# Patient Record
Sex: MALE | Race: WHITE | Employment: FULL TIME | URBAN - METROPOLITAN AREA
[De-identification: names, ages, dates, MRNs, and addresses within clinical notes are randomized per-mention and may not be internally consistent; named-entity substitution may affect disease eponyms.]

---

## 2021-06-29 ENCOUNTER — HOSPITAL ENCOUNTER (OUTPATIENT)
Dept: NON INVASIVE DIAGNOSTICS | Age: 66
Discharge: HOME | End: 2021-06-29
Payer: COMMERCIAL

## 2021-06-29 ENCOUNTER — TRANSCRIBE ORDER (OUTPATIENT)
Dept: REGISTRATION | Age: 66
End: 2021-06-29

## 2021-06-29 ENCOUNTER — HOSPITAL ENCOUNTER (OUTPATIENT)
Dept: LAB | Age: 66
Discharge: HOME | End: 2021-06-29
Payer: COMMERCIAL

## 2021-06-29 DIAGNOSIS — Z01.812 BLOOD TESTS PRIOR TO TREATMENT OR PROCEDURE: ICD-10-CM

## 2021-06-29 DIAGNOSIS — K43.9 VENTRAL HERNIA WITHOUT OBSTRUCTION OR GANGRENE: ICD-10-CM

## 2021-06-29 DIAGNOSIS — K43.9 VENTRAL HERNIA WITHOUT OBSTRUCTION OR GANGRENE: Primary | ICD-10-CM

## 2021-06-29 LAB
ATRIAL RATE: 82 BPM
CALCULATED P AXIS, ECG09: 63 DEGREES
CALCULATED R AXIS, ECG10: 32 DEGREES
CALCULATED T AXIS, ECG11: 33 DEGREES
DIAGNOSIS, 93000: NORMAL
HCT VFR BLD AUTO: 42.7 % (ref 36–48)
HGB BLD-MCNC: 13.3 G/DL (ref 13–16)
P-R INTERVAL, ECG05: 146 MS
POTASSIUM SERPL-SCNC: 4.5 MMOL/L (ref 3.5–5.5)
Q-T INTERVAL, ECG07: 376 MS
QRS DURATION, ECG06: 82 MS
QTC CALCULATION (BEZET), ECG08: 439 MS
VENTRICULAR RATE, ECG03: 82 BPM

## 2021-06-29 PROCEDURE — 85014 HEMATOCRIT: CPT

## 2021-06-29 PROCEDURE — 93005 ELECTROCARDIOGRAM TRACING: CPT

## 2021-06-29 PROCEDURE — 36415 COLL VENOUS BLD VENIPUNCTURE: CPT

## 2021-06-29 PROCEDURE — 84132 ASSAY OF SERUM POTASSIUM: CPT

## 2021-07-15 ENCOUNTER — HOSPITAL ENCOUNTER (INPATIENT)
Age: 66
LOS: 3 days | Discharge: HOME | DRG: 392 | End: 2021-07-18
Attending: EMERGENCY MEDICINE | Admitting: HOSPITALIST
Payer: COMMERCIAL

## 2021-07-15 DIAGNOSIS — K57.20 ABSCESS OF SIGMOID COLON DUE TO DIVERTICULITIS: Primary | ICD-10-CM

## 2021-07-15 LAB
ALBUMIN SERPL-MCNC: 2.5 G/DL (ref 3.4–5)
ALBUMIN/GLOB SERPL: 0.8 {RATIO} (ref 0.8–1.7)
ALP SERPL-CCNC: 54 U/L (ref 45–117)
ALT SERPL-CCNC: 17 U/L (ref 16–61)
ANION GAP SERPL CALC-SCNC: 8 MMOL/L (ref 3–18)
APTT PPP: 32 SEC (ref 23–36.4)
AST SERPL-CCNC: 8 U/L (ref 10–38)
BASOPHILS # BLD: 0 K/UL (ref 0–0.1)
BASOPHILS NFR BLD: 0 % (ref 0–2)
BILIRUB SERPL-MCNC: 0.5 MG/DL (ref 0.2–1)
BUN SERPL-MCNC: 11 MG/DL (ref 7–18)
BUN/CREAT SERPL: 20 (ref 12–20)
CALCIUM SERPL-MCNC: 7.6 MG/DL (ref 8.5–10.1)
CHLORIDE SERPL-SCNC: 109 MMOL/L (ref 100–111)
CO2 SERPL-SCNC: 26 MMOL/L (ref 21–32)
CREAT SERPL-MCNC: 0.55 MG/DL (ref 0.6–1.3)
DIFFERENTIAL METHOD BLD: ABNORMAL
EOSINOPHIL # BLD: 0.2 K/UL (ref 0–0.4)
EOSINOPHIL NFR BLD: 2 % (ref 0–5)
ERYTHROCYTE [DISTWIDTH] IN BLOOD BY AUTOMATED COUNT: 15.5 % (ref 11.6–14.5)
GLOBULIN SER CALC-MCNC: 3 G/DL (ref 2–4)
GLUCOSE SERPL-MCNC: 81 MG/DL (ref 74–99)
HCT VFR BLD AUTO: 36.3 % (ref 36–48)
HGB BLD-MCNC: 11.8 G/DL (ref 13–16)
INR PPP: 1 (ref 0.8–1.2)
LACTATE BLD-SCNC: 1.23 MMOL/L (ref 0.4–2)
LIPASE SERPL-CCNC: 54 U/L (ref 73–393)
LYMPHOCYTES # BLD: 1.8 K/UL (ref 0.9–3.6)
LYMPHOCYTES NFR BLD: 17 % (ref 21–52)
MCH RBC QN AUTO: 26.8 PG (ref 24–34)
MCHC RBC AUTO-ENTMCNC: 32.5 G/DL (ref 31–37)
MCV RBC AUTO: 82.3 FL (ref 74–97)
MONOCYTES # BLD: 1.5 K/UL (ref 0.05–1.2)
MONOCYTES NFR BLD: 14 % (ref 3–10)
NEUTS SEG # BLD: 6.8 K/UL (ref 1.8–8)
NEUTS SEG NFR BLD: 66 % (ref 40–73)
PLATELET # BLD AUTO: 335 K/UL (ref 135–420)
PMV BLD AUTO: 9.4 FL (ref 9.2–11.8)
POTASSIUM SERPL-SCNC: 3.2 MMOL/L (ref 3.5–5.5)
PROT SERPL-MCNC: 5.5 G/DL (ref 6.4–8.2)
PROTHROMBIN TIME: 13.3 SEC (ref 11.5–15.2)
RBC # BLD AUTO: 4.41 M/UL (ref 4.35–5.65)
SODIUM SERPL-SCNC: 143 MMOL/L (ref 136–145)
WBC # BLD AUTO: 10.3 K/UL (ref 4.6–13.2)

## 2021-07-15 PROCEDURE — 85610 PROTHROMBIN TIME: CPT

## 2021-07-15 PROCEDURE — 65270000029 HC RM PRIVATE

## 2021-07-15 PROCEDURE — 74011000258 HC RX REV CODE- 258: Performed by: PHYSICIAN ASSISTANT

## 2021-07-15 PROCEDURE — 74011000250 HC RX REV CODE- 250: Performed by: HOSPITALIST

## 2021-07-15 PROCEDURE — 83690 ASSAY OF LIPASE: CPT

## 2021-07-15 PROCEDURE — 74011250636 HC RX REV CODE- 250/636: Performed by: HOSPITALIST

## 2021-07-15 PROCEDURE — 99283 EMERGENCY DEPT VISIT LOW MDM: CPT

## 2021-07-15 PROCEDURE — 80053 COMPREHEN METABOLIC PANEL: CPT

## 2021-07-15 PROCEDURE — 85730 THROMBOPLASTIN TIME PARTIAL: CPT

## 2021-07-15 PROCEDURE — 74011250637 HC RX REV CODE- 250/637: Performed by: HOSPITALIST

## 2021-07-15 PROCEDURE — 74011250636 HC RX REV CODE- 250/636: Performed by: PHYSICIAN ASSISTANT

## 2021-07-15 PROCEDURE — 74011000258 HC RX REV CODE- 258: Performed by: HOSPITALIST

## 2021-07-15 PROCEDURE — 83605 ASSAY OF LACTIC ACID: CPT

## 2021-07-15 PROCEDURE — 87040 BLOOD CULTURE FOR BACTERIA: CPT

## 2021-07-15 PROCEDURE — 96365 THER/PROPH/DIAG IV INF INIT: CPT

## 2021-07-15 PROCEDURE — 85025 COMPLETE CBC W/AUTO DIFF WBC: CPT

## 2021-07-15 RX ORDER — POTASSIUM CHLORIDE 20 MEQ/1
40 TABLET, EXTENDED RELEASE ORAL
Status: COMPLETED | OUTPATIENT
Start: 2021-07-15 | End: 2021-07-15

## 2021-07-15 RX ORDER — METRONIDAZOLE 500 MG/100ML
500 INJECTION, SOLUTION INTRAVENOUS EVERY 8 HOURS
Status: DISCONTINUED | OUTPATIENT
Start: 2021-07-15 | End: 2021-07-15 | Stop reason: SDUPTHER

## 2021-07-15 RX ORDER — ACETAMINOPHEN 325 MG/1
650 TABLET ORAL
Status: DISCONTINUED | OUTPATIENT
Start: 2021-07-15 | End: 2021-07-18 | Stop reason: HOSPADM

## 2021-07-15 RX ORDER — SODIUM CHLORIDE 9 MG/ML
50 INJECTION, SOLUTION INTRAVENOUS CONTINUOUS
Status: DISCONTINUED | OUTPATIENT
Start: 2021-07-15 | End: 2021-07-18 | Stop reason: HOSPADM

## 2021-07-15 RX ORDER — METRONIDAZOLE 500 MG/100ML
500 INJECTION, SOLUTION INTRAVENOUS
Status: COMPLETED | OUTPATIENT
Start: 2021-07-15 | End: 2021-07-15

## 2021-07-15 RX ORDER — HYDROCODONE BITARTRATE AND ACETAMINOPHEN 5; 325 MG/1; MG/1
1 TABLET ORAL
Status: DISCONTINUED | OUTPATIENT
Start: 2021-07-15 | End: 2021-07-18 | Stop reason: HOSPADM

## 2021-07-15 RX ORDER — ATORVASTATIN CALCIUM 10 MG/1
10 TABLET, FILM COATED ORAL DAILY
COMMUNITY

## 2021-07-15 RX ORDER — METRONIDAZOLE 500 MG/100ML
500 INJECTION, SOLUTION INTRAVENOUS EVERY 8 HOURS
Status: DISCONTINUED | OUTPATIENT
Start: 2021-07-16 | End: 2021-07-18 | Stop reason: HOSPADM

## 2021-07-15 RX ORDER — ONDANSETRON 2 MG/ML
4 INJECTION INTRAMUSCULAR; INTRAVENOUS
Status: DISCONTINUED | OUTPATIENT
Start: 2021-07-15 | End: 2021-07-18 | Stop reason: HOSPADM

## 2021-07-15 RX ORDER — MORPHINE SULFATE 2 MG/ML
1 INJECTION, SOLUTION INTRAMUSCULAR; INTRAVENOUS
Status: DISCONTINUED | OUTPATIENT
Start: 2021-07-15 | End: 2021-07-17

## 2021-07-15 RX ADMIN — POTASSIUM CHLORIDE 40 MEQ: 1500 TABLET, EXTENDED RELEASE ORAL at 18:38

## 2021-07-15 RX ADMIN — PIPERACILLIN AND TAZOBACTAM 4.5 G: 4; .5 INJECTION, POWDER, LYOPHILIZED, FOR SOLUTION INTRAVENOUS; PARENTERAL at 22:56

## 2021-07-15 RX ADMIN — FAMOTIDINE 20 MG: 10 INJECTION, SOLUTION INTRAVENOUS at 22:56

## 2021-07-15 RX ADMIN — METRONIDAZOLE 500 MG: 500 INJECTION, SOLUTION INTRAVENOUS at 15:56

## 2021-07-15 RX ADMIN — SODIUM CHLORIDE 1000 ML: 900 INJECTION, SOLUTION INTRAVENOUS at 15:12

## 2021-07-15 RX ADMIN — SODIUM CHLORIDE 75 ML/HR: 900 INJECTION, SOLUTION INTRAVENOUS at 18:39

## 2021-07-15 RX ADMIN — PIPERACILLIN AND TAZOBACTAM 4.5 G: 4; .5 INJECTION, POWDER, LYOPHILIZED, FOR SOLUTION INTRAVENOUS; PARENTERAL at 15:13

## 2021-07-15 NOTE — ED PROVIDER NOTES
EMERGENCY DEPARTMENT HISTORY AND PHYSICAL EXAM    Date: 7/15/2021  Patient Name: Kathie Stanford    History of Presenting Illness     Chief Complaint   Patient presents with    Abdominal Pain         History Provided By: Patient    Chief Complaint: abdominal pain    HPI(Context):   2:37 PM  Kathie Stanford is a 61 y.o. male with PMHX of HLP who presents to the emergency department C/O periumbilical abdominal pain. Pt was sent to [facility] ED by Dr. Jaene Garcia [facility]) for sigmoid diverticulitis with abscess. Pt had outpatient CT scan performed at [facility]. imagine this AM and pt was told to come to ED for admission. Pt reports pain is worse with palpation of area and pt thought it was due to ventral hernia. Pt was supposed to have hernia repair with Dr. Susanne Sandoval this week but surgery was aborted after a mass was found during abdominal palpation. Pt denies fever, chills, nausea, vomiting, diarrhea, and any other sxs or complaints. Last colonoscopy was 3 years ago which was normal per patient. PCP: Cristian Herrmann MD        Past History     Past Medical History:  Past Medical History:   Diagnosis Date    High cholesterol        Past Surgical History:  History reviewed. No pertinent surgical history. Family History:  History reviewed. No pertinent family history. Social History:  Social History     Tobacco Use    Smoking status: Never Smoker    Smokeless tobacco: Never Used   Substance Use Topics    Alcohol use: Yes    Drug use: Not Currently       Allergies:  No Known Allergies      Review of Systems   Review of Systems   Constitutional: Negative for chills and fever. Gastrointestinal: Positive for abdominal pain. Negative for diarrhea, nausea and vomiting. Musculoskeletal: Negative for back pain. Neurological: Negative for dizziness and light-headedness. All other systems reviewed and are negative.       Physical Exam     Vitals:    07/15/21 1426 07/15/21 1600 07/15/21 1615   BP: (!) 189/65 (!) 158/66 (!) 154/66   Pulse: 96 82 81   Resp: 16 15 15   Temp: 98 °F (36.7 °C)     SpO2: 98% 95% 93%   Weight: 104.3 kg (230 lb)     Height: 5' 11\" (1.803 m)       Physical Exam  Vitals and nursing note reviewed. Constitutional:       General: He is not in acute distress. Appearance: He is well-developed. He is not diaphoretic. Comments:  male in NAD. Alert. Appears comfortable. HENT:      Head: Normocephalic and atraumatic. Right Ear: External ear normal.      Left Ear: External ear normal.      Nose: Nose normal.   Eyes:      General: No scleral icterus. Right eye: No discharge. Left eye: No discharge. Conjunctiva/sclera: Conjunctivae normal.   Cardiovascular:      Rate and Rhythm: Normal rate and regular rhythm. Heart sounds: Normal heart sounds. No murmur heard. No friction rub. No gallop. Pulmonary:      Effort: Pulmonary effort is normal. No tachypnea, accessory muscle usage or respiratory distress. Breath sounds: Normal breath sounds. No decreased breath sounds, wheezing, rhonchi or rales. Abdominal:      General: Bowel sounds are normal. There is no distension. Palpations: Abdomen is soft. There is mass (periumbilical). Tenderness: There is abdominal tenderness in the periumbilical area. There is no right CVA tenderness, guarding or rebound. Negative signs include McBurney's sign. Musculoskeletal:         General: Normal range of motion. Cervical back: Normal range of motion. Skin:     General: Skin is warm and dry. Neurological:      Mental Status: He is alert and oriented to person, place, and time.    Psychiatric:         Behavior: Behavior normal.         Judgment: Judgment normal.             Diagnostic Study Results     Labs -     Recent Results (from the past 12 hour(s))   CBC WITH AUTOMATED DIFF    Collection Time: 07/15/21  2:40 PM   Result Value Ref Range    WBC 10.3 4.6 - 13.2 K/uL    RBC 4.41 4.35 - 5.65 M/uL HGB 11.8 (L) 13.0 - 16.0 g/dL    HCT 36.3 36.0 - 48.0 %    MCV 82.3 74.0 - 97.0 FL    MCH 26.8 24.0 - 34.0 PG    MCHC 32.5 31.0 - 37.0 g/dL    RDW 15.5 (H) 11.6 - 14.5 %    PLATELET 049 634 - 610 K/uL    MPV 9.4 9.2 - 11.8 FL    NEUTROPHILS 66 40 - 73 %    LYMPHOCYTES 17 (L) 21 - 52 %    MONOCYTES 14 (H) 3 - 10 %    EOSINOPHILS 2 0 - 5 %    BASOPHILS 0 0 - 2 %    ABS. NEUTROPHILS 6.8 1.8 - 8.0 K/UL    ABS. LYMPHOCYTES 1.8 0.9 - 3.6 K/UL    ABS. MONOCYTES 1.5 (H) 0.05 - 1.2 K/UL    ABS. EOSINOPHILS 0.2 0.0 - 0.4 K/UL    ABS.  BASOPHILS 0.0 0.0 - 0.1 K/UL    DF AUTOMATED     PROTHROMBIN TIME + INR    Collection Time: 07/15/21  2:40 PM   Result Value Ref Range    Prothrombin time 13.3 11.5 - 15.2 sec    INR 1.0 0.8 - 1.2     PTT    Collection Time: 07/15/21  2:40 PM   Result Value Ref Range    aPTT 32.0 23.0 - 36.4 SEC   POC LACTIC ACID    Collection Time: 07/15/21  3:02 PM   Result Value Ref Range    Lactic Acid (POC) 1.23 0.40 - 2.00 mmol/L         IR DRAIN ABSCESS W CATHETER PERC    (Results Pending)     CT Results  (Last 48 hours)    None        CXR Results  (Last 48 hours)    None          Medications given in the ED-  Medications   metroNIDAZOLE (FLAGYL) IVPB premix 500 mg (500 mg IntraVENous New Bag 7/15/21 4416)   piperacillin-tazobactam (ZOSYN) 4.5 g in 0.9% sodium chloride (MBP/ADV) 100 mL MBP (has no administration in time range)   acetaminophen (TYLENOL) tablet 650 mg (has no administration in time range)   ondansetron (ZOFRAN) injection 4 mg (has no administration in time range)   morphine injection 1 mg (has no administration in time range)   HYDROcodone-acetaminophen (NORCO) 5-325 mg per tablet 1 Tablet (has no administration in time range)   famotidine (PF) (PEPCID) 20 mg in 0.9% sodium chloride 10 mL injection (has no administration in time range)   metroNIDAZOLE (FLAGYL) IVPB premix 500 mg (has no administration in time range)   sodium chloride 0.9 % bolus infusion 1,000 mL (1,000 mL IntraVENous New Bag 7/15/21 1512)   piperacillin-tazobactam (ZOSYN) 4.5 g in 0.9% sodium chloride (MBP/ADV) 100 mL MBP (0 g IntraVENous IV Completed 7/15/21 1556)         Medical Decision Making   I am the first provider for this patient. I reviewed the vital signs, available nursing notes, past medical history, past surgical history, family history and social history. Vital Signs-Reviewed the patient's vital signs. Pulse Oximetry Analysis - 98% on RA. NORMAL     Records Reviewed: Nursing Notes and Old Medical Records    Provider Notes (Medical Decision Making): known abscessed diverticulum. Sent to ED for admission, IV ABX, and IR elias     Procedures:  Procedures    ED Course:   2:37 PM Initial assessment performed. The patients presenting problems have been discussed, and they are in agreement with the care plan formulated and outlined with them. I have encouraged them to ask questions as they arise throughout their visit. Diagnosis and Disposition       3:45 PM  Obtained [facility] imaging from today. Pt has acute diverticulitis of sigmoid colon with abscess measuring at 6.6 cm x 7.7 cm x 8.9 cm. Discussed with Dr. Diann Cleveland (hospitalist). Will give check labs, lactate (normal), blood cultures, give IV ABX (zosyn, flagyl), and admit to medical. She will consult with IR for drainage. 3:49 PM Consult: I discussed care with Dr. Diann Cleveland (on call medicine) It was a standard discussion, including history of patients chief complaint, available diagnostic results, and treatment course. Aware of pt. See plan above. 1. Abscess of sigmoid colon due to diverticulitis        PLAN:  1. Admit to medical for IR drainage and IV ABX      Please note that this dictation was completed with Galenea, the [employer] voice recognition software. Quite often unanticipated grammatical, syntax, homophones, and other interpretive errors are inadvertently transcribed by the computer software. Please disregard these errors.   Please excuse any errors that have escaped final proofreading.

## 2021-07-15 NOTE — H&P
[facility] [place]  HISTORY AND PHYSICAL    Name:  James Eller  MR#:   [id number]  :  1955  ACCOUNT #:  [de-identified]  ADMIT DATE:  07/15/2021      ADMISSION DIAGNOSIS:  Diverticular abscess. HISTORY OF PRESENT ILLNESS:  The patient is 61years old. He is a gentleman who has a past medical history of high cholesterol and was set up for surgery on a periumbilical hernia this past Monday. He was examined preoperatively and shown also to have an undiagnosed abdominal mass during that exam and subsequently was set up to have a CT scan, which was done this morning that showed a large contained diverticular abscess, and thus the patient was advised to come to the emergency room. His last colonoscopy was three years ago. It was normal.  He denies any notable abdominal pain. Also, he feels like he has had some discomfort recently. His wife states his bowel habits have changed significantly over the past five to six months. He has a notable change in his bowel habits as well as odor to his bowel movements. He has had no vomiting, no fevers, no chills, no myalgias. He has been eating and drinking as normally as he usually does per his report. The abdominal pain has not kept him from going to work. He denies any dysuria or hematuria. PCP is Dr. Emile Gomez. PAST MEDICAL HISTORY:  Hypercholesterolemia. PREVIOUS SURGERIES:  None. FAMILY HISTORY:  No notable intraabdominal issues or colon disease. SOCIAL HISTORY:  Nonsmoker. Drinks a social alcoholic beverage. No drug use. He works as a  in [employer]. MEDICATIONS:  He has no medications outside of Lipitor that he takes prescription wise. REVIEW OF SYSTEMS:  CONSTITUTIONAL:  He has no fevers or chills. GASTROINTESTINAL:  He has lower abdominal pain, not excruciating. No back pain, no pelvic pain. No diarrhea, nausea, or vomiting. MUSCULOSKELETAL:  No new myalgias or arthralgias.   NEUROLOGIC:  No dizziness, lightheadedness, or syncope. CARDIOVASCULAR:  No chest pain or palpitations. RESPIRATORY:  No shortness of breath, cough, or wheezing. PHYSICAL EXAMINATION:  VITAL SIGNS:  His blood pressure is 154/66, respiratory rate is 15, SaO2 is 93% on room air, pulse 81, temperature 98.9. GENERAL:  He appears very comfortable, 28-year-old nontoxic-appearing male. LUNGS:  Clear bilaterally. CARDIAC:  Regular rate and rhythm. No murmur, rub, or gallop. ABDOMEN:  Soft but there is a firm distention around his periumbilical region that is somewhat tender to deep palpation, also not excruciating and there is no guarding. He has diminished bowel sounds. There is no ascites. No other mass noted. No hepatomegaly. EXTREMITIES:  His lower extremities are without clubbing, cyanosis, or edema. LABORATORY DATA:  His lactic acid is 1.23. White count 10.3, H and H 11.8 and 36.3, platelet count is 562. Metabolic panel is unremarkable except for a potassium of 3.2, glucose is 81. Blood cultures have been sent. INR is 1. CT was done at [facility].. I have reviewed the written report. We are trying to get this on disc so that it can go to Interventional Radiology here, and we can proceed with drainage. ASSESSMENT:  It looks like at this time due to them not having a copy of this CD that we will start antibiotics tonight, keep him n.p.o. after midnight, and proceed with Interventional Radiology drainage in the morning. He does not have peritonitis. He is not septic, and he appears to be stable otherwise. I suspect this has been brewing for quite some time. So with that said, we are going to admit him to the hospital for diverticular abscess. PLAN:  Start Flagyl every eight hours and Zosyn every six hours, fluid bolus, then maintenance IV fluids. We will give him one dose of potassium, place him on Pepcid IV every 12 hours. He has pain medicine from oral to IV depending on the severity of his pain.   Once IR is able to drain this, we will send that fluid for culture. Clear liquid diet for now, n.p.o. after midnight. Consultation with Surgery who called me earlier about the patient. I knew about him already as an outpatient, and with that, I am expecting him to be in the hospital for close to a week considering the size of the abscess and the extent of antibiotics he will require and then I am anticipating that he will likely have to have partial colectomy due to the notable infectious issue. Whether or not that can be done after this episode or if it will need to be done while he is here in the hospital is yet to be determined. I have discussed that plan of care with both him and his wife. He is a full code status. Dario Frankel, MD      [id number]  D:  07/15/2021 17:24  T:  07/15/2021 18:22  JOB #:  [id number]  CC:   Mariluz Vargas MD

## 2021-07-15 NOTE — ED NOTES
Assumed pt care,pt stable resting on stretcher with no needs expressed at this time. Spouse at bedside.  Bed in lowest position call bell within reach will continue to monitor

## 2021-07-15 NOTE — Clinical Note
Status[de-identified] INPATIENT [101]   Type of Bed: Medical [8]   Inpatient Hospitalization Certified Necessary for the Following Reasons: 3.  Patient receiving treatment that can only be provided in an inpatient setting (further clarification in H&P documentation)   Admitting Diagnosis: Abscess of sigmoid colon due to diverticulitis [[id number]]   Admitting Physician: Maura Jimenez   Attending Physician: Maura Jimenez   Estimated Length of Stay: 2 Midnights   Discharge Plan[de-identified] Home with Office Follow-up

## 2021-07-15 NOTE — ED TRIAGE NOTES
Patient ambulatory into ER c/o abdominal pain. Patient sent by Dr. Mike Cox w/ abscessed diverticulium.

## 2021-07-15 NOTE — ROUTINE PROCESS
TRANSFER - OUT REPORT:    Verbal report given to [address] (name) on [facility]  being transferred to [address] [place] [address](unit) for routine progression of care       Report consisted of patients Situation, Background, Assessment and   Recommendations(SBAR). Information from the following report(s) SBAR, MAR and Recent Results was reviewed with the receiving nurse. Lines:   Peripheral IV 07/15/21 Left (Active)   Site Assessment Clean, dry, & intact 07/15/21 1508   Phlebitis Assessment 0 07/15/21 1508   Infiltration Assessment 0 07/15/21 1508   Dressing Status Clean, dry, & intact 07/15/21 1508   Hub Color/Line Status Pink 07/15/21 1508        Opportunity for questions and clarification was provided.       Patient transported with:   [employer]

## 2021-07-16 ENCOUNTER — APPOINTMENT (OUTPATIENT)
Dept: CT IMAGING | Age: 66
DRG: 392 | End: 2021-07-16
Attending: HOSPITALIST
Payer: COMMERCIAL

## 2021-07-16 LAB
ALBUMIN SERPL-MCNC: 2.8 G/DL (ref 3.4–5)
ALBUMIN/GLOB SERPL: 0.8 {RATIO} (ref 0.8–1.7)
ALP SERPL-CCNC: 51 U/L (ref 45–117)
ALT SERPL-CCNC: 20 U/L (ref 16–61)
ANION GAP SERPL CALC-SCNC: 9 MMOL/L (ref 3–18)
AST SERPL-CCNC: 13 U/L (ref 10–38)
BASOPHILS # BLD: 0 K/UL (ref 0–0.1)
BASOPHILS NFR BLD: 0 % (ref 0–2)
BILIRUB SERPL-MCNC: 0.8 MG/DL (ref 0.2–1)
BUN SERPL-MCNC: 7 MG/DL (ref 7–18)
BUN/CREAT SERPL: 11 (ref 12–20)
CALCIUM SERPL-MCNC: 8.6 MG/DL (ref 8.5–10.1)
CHLORIDE SERPL-SCNC: 108 MMOL/L (ref 100–111)
CO2 SERPL-SCNC: 25 MMOL/L (ref 21–32)
CREAT SERPL-MCNC: 0.64 MG/DL (ref 0.6–1.3)
DIFFERENTIAL METHOD BLD: ABNORMAL
EOSINOPHIL # BLD: 0.2 K/UL (ref 0–0.4)
EOSINOPHIL NFR BLD: 3 % (ref 0–5)
ERYTHROCYTE [DISTWIDTH] IN BLOOD BY AUTOMATED COUNT: 15.7 % (ref 11.6–14.5)
GLOBULIN SER CALC-MCNC: 3.5 G/DL (ref 2–4)
GLUCOSE SERPL-MCNC: 98 MG/DL (ref 74–99)
HCT VFR BLD AUTO: 34 % (ref 36–48)
HGB BLD-MCNC: 11.1 G/DL (ref 13–16)
LYMPHOCYTES # BLD: 2.1 K/UL (ref 0.9–3.6)
LYMPHOCYTES NFR BLD: 25 % (ref 21–52)
MCH RBC QN AUTO: 26.9 PG (ref 24–34)
MCHC RBC AUTO-ENTMCNC: 32.6 G/DL (ref 31–37)
MCV RBC AUTO: 82.5 FL (ref 74–97)
MONOCYTES # BLD: 1.1 K/UL (ref 0.05–1.2)
MONOCYTES NFR BLD: 13 % (ref 3–10)
NEUTS SEG # BLD: 4.7 K/UL (ref 1.8–8)
NEUTS SEG NFR BLD: 58 % (ref 40–73)
PLATELET # BLD AUTO: 307 K/UL (ref 135–420)
PMV BLD AUTO: 9.4 FL (ref 9.2–11.8)
POTASSIUM SERPL-SCNC: 4.2 MMOL/L (ref 3.5–5.5)
PROT SERPL-MCNC: 6.3 G/DL (ref 6.4–8.2)
RBC # BLD AUTO: 4.12 M/UL (ref 4.35–5.65)
SODIUM SERPL-SCNC: 142 MMOL/L (ref 136–145)
WBC # BLD AUTO: 8.1 K/UL (ref 4.6–13.2)

## 2021-07-16 PROCEDURE — 74011250636 HC RX REV CODE- 250/636: Performed by: HOSPITALIST

## 2021-07-16 PROCEDURE — 74011250636 HC RX REV CODE- 250/636: Performed by: RADIOLOGY

## 2021-07-16 PROCEDURE — 77030003622 CT DRAIN ABS W CATH PERC

## 2021-07-16 PROCEDURE — 36415 COLL VENOUS BLD VENIPUNCTURE: CPT

## 2021-07-16 PROCEDURE — 74011000250 HC RX REV CODE- 250: Performed by: RADIOLOGY

## 2021-07-16 PROCEDURE — 65270000029 HC RM PRIVATE

## 2021-07-16 PROCEDURE — 85025 COMPLETE CBC W/AUTO DIFF WBC: CPT

## 2021-07-16 PROCEDURE — 87186 SC STD MICRODIL/AGAR DIL: CPT

## 2021-07-16 PROCEDURE — 74011250637 HC RX REV CODE- 250/637: Performed by: HOSPITALIST

## 2021-07-16 PROCEDURE — 99152 MOD SED SAME PHYS/QHP 5/>YRS: CPT

## 2021-07-16 PROCEDURE — 87077 CULTURE AEROBIC IDENTIFY: CPT

## 2021-07-16 PROCEDURE — 87205 SMEAR GRAM STAIN: CPT

## 2021-07-16 PROCEDURE — 0D9N30Z DRAINAGE OF SIGMOID COLON WITH DRAINAGE DEVICE, PERCUTANEOUS APPROACH: ICD-10-PCS | Performed by: RADIOLOGY

## 2021-07-16 PROCEDURE — 80053 COMPREHEN METABOLIC PANEL: CPT

## 2021-07-16 PROCEDURE — 74011000258 HC RX REV CODE- 258: Performed by: HOSPITALIST

## 2021-07-16 RX ORDER — MIDAZOLAM HYDROCHLORIDE 1 MG/ML
.5-2 INJECTION, SOLUTION INTRAMUSCULAR; INTRAVENOUS
Status: DISCONTINUED | OUTPATIENT
Start: 2021-07-16 | End: 2021-07-16

## 2021-07-16 RX ORDER — FENTANYL CITRATE 50 UG/ML
25-100 INJECTION, SOLUTION INTRAMUSCULAR; INTRAVENOUS
Status: DISCONTINUED | OUTPATIENT
Start: 2021-07-16 | End: 2021-07-16

## 2021-07-16 RX ORDER — LIDOCAINE HYDROCHLORIDE 10 MG/ML
1-10 INJECTION INFILTRATION; PERINEURAL
Status: COMPLETED | OUTPATIENT
Start: 2021-07-16 | End: 2021-07-16

## 2021-07-16 RX ORDER — SAME BUTANEDISULFONATE/BETAINE 400-600 MG
500 POWDER IN PACKET (EA) ORAL 2 TIMES DAILY
Status: DISCONTINUED | OUTPATIENT
Start: 2021-07-16 | End: 2021-07-18 | Stop reason: HOSPADM

## 2021-07-16 RX ORDER — FAMOTIDINE 20 MG/1
20 TABLET, FILM COATED ORAL DAILY
Status: DISCONTINUED | OUTPATIENT
Start: 2021-07-17 | End: 2021-07-18 | Stop reason: HOSPADM

## 2021-07-16 RX ADMIN — FENTANYL CITRATE 50 MCG: 50 INJECTION INTRAMUSCULAR; INTRAVENOUS at 10:03

## 2021-07-16 RX ADMIN — METRONIDAZOLE 500 MG: 500 INJECTION, SOLUTION INTRAVENOUS at 23:04

## 2021-07-16 RX ADMIN — PIPERACILLIN AND TAZOBACTAM 4.5 G: 4; .5 INJECTION, POWDER, LYOPHILIZED, FOR SOLUTION INTRAVENOUS; PARENTERAL at 22:29

## 2021-07-16 RX ADMIN — PIPERACILLIN AND TAZOBACTAM 4.5 G: 4; .5 INJECTION, POWDER, LYOPHILIZED, FOR SOLUTION INTRAVENOUS; PARENTERAL at 03:56

## 2021-07-16 RX ADMIN — METRONIDAZOLE 500 MG: 500 INJECTION, SOLUTION INTRAVENOUS at 17:00

## 2021-07-16 RX ADMIN — MIDAZOLAM HYDROCHLORIDE 1 MG: 1 INJECTION, SOLUTION INTRAMUSCULAR; INTRAVENOUS at 10:03

## 2021-07-16 RX ADMIN — Medication 500 MG: at 22:28

## 2021-07-16 RX ADMIN — PIPERACILLIN AND TAZOBACTAM 4.5 G: 4; .5 INJECTION, POWDER, LYOPHILIZED, FOR SOLUTION INTRAVENOUS; PARENTERAL at 14:31

## 2021-07-16 RX ADMIN — METRONIDAZOLE 500 MG: 500 INJECTION, SOLUTION INTRAVENOUS at 00:46

## 2021-07-16 RX ADMIN — LIDOCAINE HYDROCHLORIDE 7 ML: 10 INJECTION, SOLUTION INFILTRATION; PERINEURAL at 10:03

## 2021-07-16 RX ADMIN — HYDROCODONE BITARTRATE AND ACETAMINOPHEN 1 TABLET: 5; 325 TABLET ORAL at 14:36

## 2021-07-16 NOTE — PROGRESS NOTES
Patient identity confirmed using verbal statement and confirmation of armband with name and  identifiers. Confirmed patient has been NPO since midnight, takes no anticoagulants, has had no prior issues with anesthesia/sedation medications. Pt wishes to proceed with scheduled CT guided abscess drainage. Patient brought to the CT room by transport and assisted into correct position as confirmed by Otoniel Katz [facility]. Consent confirmed. Vitals monitor initiated with EKG monitoring. 2L of O2 initiated via nasal cannula. Patient resting comfortably. Will continue to monitor.  Lian Martinez RN

## 2021-07-16 NOTE — PROGRESS NOTES
Bedside and verbal report given to CORINNA Otto, RN (oncoming nurse) by Nery Sharma RN  (off going nurse). Report included the following information SBAR, Kardex, OR Summary, Intake/Output, and MAR. Uneventful shift; no acute changes, no complaints of pain    Bedside and verbal report given by (off going nurse) CORINNA Otto, RN to (oncoming nurse) Jeffrey Mahajan RN. Report included the following information SBAR, Kardex, OR Summary, Intake/Output, and MAR.

## 2021-07-16 NOTE — PROGRESS NOTES
Bedside and verbal report given to CORINNA Villalpando RN (oncoming nurse) by Deyanira Angel RN  (off going nurse). Report included the following information SBAR, Kardex, OR Summary, Intake/Output, and MAR. Pt had an uneventful shift; no acute changes, no complaints of pain    Bedside and verbal report given by (off going nurse) CORINNA Villalpando RN to (oncoming nurse) ***. Report included the following information SBAR, Kardex, OR Summary, Intake/Output, and MAR.

## 2021-07-16 NOTE — PROGRESS NOTES
Hospitalist Progress Note    Patient: Kathie Stanford MRN: [id number]  CSN: [id number]    YOB: 1955  Age: 61 y.o. Sex: male    DOA: 7/15/2021 LOS:  LOS: 1 day          Chief Complaint:    abd pain      Assessment/Plan     Diverticular abscess, contained perforation with abscess  S/p IR drainage this am    Plan:  IV zosyn and flagyl  Drain mgmt  Pain control  Reg diet as he has had no N/V/D sx of significance  Start probiotic as ABX course will be lengthy  Colorectal surg consult    Expect he will need picc by Monday and completion of IV abx as outpatient possibly    Surgery will be inevitable down the road    DVT prophylaxis    Discussed all with pt and wife in room      Disposition :  Patient Active Problem List   Diagnosis Code    Abscess of sigmoid colon due to diverticulitis K57.20       Subjective:    He feels ok  No diarrhea  No CP or SOB  No issues overnight  He has no major abd pain    Review of systems:    Constitutional: denies fevers, chills    Gastrointestinal: denies nausea, vomiting, diarrhea      Vital signs/Intake and Output:  Visit Vitals  /72 (BP 1 Location: Left upper arm, BP Patient Position: At rest)   Pulse 79   Temp 97.7 °F (36.5 °C)   Resp 16   Ht 5' 11\" (1.803 m)   Wt 108 kg (238 lb)   SpO2 100%   BMI 33.19 kg/m²     Current Shift:  No intake/output data recorded. Last three shifts:  07/14 1901 - 07/16 0700  In: 340 [P.O.:240;  I.V.:100]  Out: -     Exam:    General: Well developed, alert, NAD, OX3  CVS:Regular rate and rhythm, no M/R/G, S1/S2 heard, no thrill  Lungs:Clear to auscultation bilaterally, no wheezes, rhonchi, or rales  Abdomen: Soft, mild tenderness lower abd, drain mid low abd now, few nl BS  Extremities: No C/C/E, pulses palpable 2+  Neuro:grossly normal , follows commands  Psych:appropriate                Labs: Results:       Chemistry Recent Labs     07/16/21  0340 07/15/21  1554   GLU 98 81    143   K 4.2 3.2*    109   CO2 25 26   BUN 7 11   CREA 0.64 0.55*   CA 8.6 7.6*   AGAP 9 8   BUCR 11* 20   AP 51 54   TP 6.3* 5.5*   ALB 2.8* 2.5*   GLOB 3.5 3.0   AGRAT 0.8 0.8      CBC w/Diff Recent Labs     07/16/21  0340 07/15/21  1440   WBC 8.1 10.3   RBC 4.12* 4.41   HGB 11.1* 11.8*   HCT 34.0* 36.3    335   GRANS 58 66   LYMPH 25 17*   EOS 3 2      Cardiac Enzymes No results for input(s): CPK, CKND1, JAMI in the last 72 hours. No lab exists for component: CKRMB, TROIP   Coagulation Recent Labs     07/15/21  1440   PTP 13.3   INR 1.0   APTT 32.0       Lipid Panel No results found for: CHOL, CHOLPOCT, CHOLX, CHLST, CHOLV, 020897, HDL, HDLP, LDL, LDLC, DLDLP, 668929, VLDLC, VLDL, TGLX, TRIGL, TRIGP, TGLPOCT, CHHD, CHHDX   BNP No results for input(s): BNPP in the last 72 hours.    Liver Enzymes Recent Labs     07/16/21  0340   TP 6.3*   ALB 2.8*   AP 51      Thyroid Studies No results found for: T4, T3U, TSH, TSHEXT     Procedures/imaging: see electronic medical records for all procedures/Xrays and details which were not copied into this note but were reviewed prior to creation of Beatriz Mitchell MD

## 2021-07-16 NOTE — PROGRESS NOTES
Problem: Falls - Risk of  Goal: *Absence of Falls  Description: Document Mauricio Rosario Fall Risk and appropriate interventions in the flowsheet.   Outcome: Progressing Towards Goal  Note: Fall Risk Interventions:

## 2021-07-16 NOTE — PROGRESS NOTES
Reason for Admission:  Diverticular abscess                     RUR Score:   Low; 8%                  Plan for utilizing home health:  unlikely        PCP: First and Last name:  Tam Wilkins MD     Name of Practice:    Are you a current patient: Yes/No:    Approximate date of last visit:    Can you participate in a virtual visit with your PCP:                     Current Advanced Directive/Advance Care Plan: Full Code      Healthcare Decision Maker:   Click here to complete [address] including selection of the Healthcare Decision Maker Relationship (ie \"Primary\")             Primary Decision Maker: Johnny 3 - [phone]                  Transition of Care Plan:   Home with physician follow up                    Chart reviewed. Per H&P \"The patient is 61years old. He is a gentleman who has a past medical history of high cholesterol and was set up for surgery on a periumbilical hernia this past Monday. He was examined preoperatively and shown also to have an undiagnosed abdominal mass during that exam and subsequently was set up to have a CT scan, which was done this morning that showed a large contained diverticular abscess, and thus the patient was advised to come to the emergency room. His last colonoscopy was three years ago. It was normal.  He denies any notable abdominal pain. Also, he feels like he has had some discomfort recently. His wife states his bowel habits have changed significantly over the past five to six months. He has a notable change in his bowel habits as well as odor to his bowel movements. He has had no vomiting, no fevers, no chills, no myalgias. He has been eating and drinking as normally as he usually does per his report. The abdominal pain has not kept him from going to work. He denies any dysuria or hematuria. PCP is Dr. Kai Mondragon. \"    Noted pt with a surgery consult pending. CM spoke with pt to discuss transition of care.   Pt is  and independent. Please encourage ambulation as appropriate to assist with identifying potential transition of care needs. Anticipate pt will transition home with physician follow up . CM to continue to follow and assist.      Care Management Interventions  Mode of Transport at Discharge:  Other (see comment) (Family)  Transition of Care Consult (CM Consult): Discharge Planning  Health Maintenance Reviewed: Yes  Current Support Network: Lives with Spouse  The Plan for Transition of Care is Related to the Following Treatment Goals : Home with physician follow up   Discharge Location  Discharge Placement: Home with family assistance

## 2021-07-16 NOTE — CONSULTS
Consult Note    Patient: Maxwell Ellison MRN: [id number]  CSN: [id number]    YOB: 1955  Age: 61 y.o. Sex: male    DOA: 7/15/2021 LOS:  LOS: 1 day        Requesting Physician: Dr. Hill Lewis  Reason for Consultation: perforated diverticulitis               HPI:     Maxwell Ellison is a 61 y.o. male who has been seen for perforated diverticulitis with large abscess. Pt has been having months of abdominal pain however associated pain with his hernia. At surgery Dr. Maria Alejandra Torres felt and intra-abdominal mass and aborted. He obtained a CT scan which showed diverticulitis with abscess. The pt was sent to [facility] for admission, treatment and abscess drainage. He denies all other symptoms. His last colonoscopy was 3 yrs ago. Past Medical History:   Diagnosis Date    High cholesterol        History reviewed. No pertinent surgical history. History reviewed. No pertinent family history. Social History     Socioeconomic History    Marital status:      Spouse name: Not on file    Number of children: Not on file    Years of education: Not on file    Highest education level: Not on file   Tobacco Use    Smoking status: Never Smoker    Smokeless tobacco: Never Used   Substance and Sexual Activity    Alcohol use: Yes    Drug use: Not Currently     Social Determinants of Health     Financial Resource Strain:     Difficulty of Paying Living Expenses:    Food Insecurity:     Worried About Running Out of Food in the Last Year:     [address] in the Last Year:    Transportation Needs:     Lack of Transportation (Medical):      Lack of Transportation (Non-Medical):    Physical Activity:     Days of Exercise per Week:     Minutes of Exercise per Session:    Stress:     Feeling of Stress :    Social Connections:     Frequency of Communication with Friends and Family:     Frequency of Social Gatherings with Friends and Family:     Attends Evangelical Services:     Active Member of Clubs or Organizations:     Attends Club or Organization Meetings:     Marital Status:        Prior to Admission medications    Medication Sig Start Date End Date Taking? Authorizing Provider   atorvastatin (LIPITOR) 10 mg tablet Take 10 mg by mouth daily. Indications: excessive fat in the blood   Yes Provider, Historical       No Known Allergies    Review of Systems  Pertinent items are noted in the History of Present Illness. Physical Exam:      Visit Vitals  BP (!) 145/73   Pulse 77   Temp 97.9 °F (36.6 °C)   Resp 21   Ht 5' 11\" (1.803 m)   Wt 108 kg (238 lb)   SpO2 91%   BMI 33.19 kg/m²         Physical Exam:    GENERAL: alert, cooperative, no distress, appears stated age, LUNG: clear to auscultation bilaterally, HEART: regular rate and rhythm, ABDOMEN: soft TTP in low mid abd with firm mass. Drain in place with reddish brown malodorous liquid, EXTREMITIES:  extremities normal, atraumatic, no cyanosis or edema, SKIN: no rash or abnormalities, NEUROLOGIC: AOx3. Gait normal. Reflexes and motor strength normal and symmetric. Cranial nerves 2-12 and sensation grossly intact., PSYCH: non focal    Labs Reviewed:      Labs: Results:       Chemistry Recent Labs     07/16/21  0340 07/15/21  1554   GLU 98 81    143   K 4.2 3.2*    109   CO2 25 26   BUN 7 11   CREA 0.64 0.55*   CA 8.6 7.6*   AGAP 9 8   BUCR 11* 20   AP 51 54   TP 6.3* 5.5*   ALB 2.8* 2.5*   GLOB 3.5 3.0   AGRAT 0.8 0.8      CBC w/Diff Recent Labs     07/16/21  0340 07/15/21  1440   WBC 8.1 10.3   RBC 4.12* 4.41   HGB 11.1* 11.8*   HCT 34.0* 36.3    335   GRANS 58 66   LYMPH 25 17*   EOS 3 2      Cardiac Enzymes No results for input(s): CPK, CKND1, JAMI in the last 72 hours.     No lab exists for component: CKRMB, TROIP   Coagulation Recent Labs     07/15/21  1440   PTP 13.3   INR 1.0   APTT 32.0       Lipid Panel No results found for: CHOL, CHOLPOCT, CHOLX, CHLST, CHOLV, 209075, HDL, HDLP, LDL, LDLC, DLDLP, 799176, VLDLC, VLDL, TGLX, TRIGL, TRIGP, TGLPOCT, CHHD, CHHDX   BNP No results for input(s): BNPP in the last 72 hours. Liver Enzymes Recent Labs     07/16/21  0340   TP 6.3*   ALB 2.8*   AP 51      Thyroid Studies No results found for: T4, T3U, TSH, TSHEXT         Imaging:  images and reports reviewed and reviewed  CT, Consultants documentation, Nursing documentation and I & O      Assessment/Plan     Patient Active Problem List   Diagnosis Code    Abscess of sigmoid colon due to diverticulitis K57.20       Discussed diagnosis at length with pt and wife. 30mins face to face. Pt will continue with drainage, antibiotics, IV narcotics. He will progress in his diet and after improvement will DC home with the drain with follow up with me. After \"cool-off\" period, pt will undergo controlled resection. All questions answered.

## 2021-07-16 NOTE — PROGRESS NOTES
TRANSFER - OUT REPORT:    Verbal report given to Felicia Henderson RN(name) on [facility]  being transferred to [facility] (unit) for routine progression of care       Report consisted of patients Situation, Background, Assessment and   Recommendations(SBAR). Information from the following report(s) SBAR, Procedure Summary, MAR and Cardiac Rhythm NSR was reviewed with the receiving nurse. Lines:   Peripheral IV 07/15/21 Left (Active)   Site Assessment Clean, dry, & intact 07/1955   Phlebitis Assessment 0 07/1955   Infiltration Assessment 0 07/1955   Dressing Status Clean, dry, & intact 07/1955   Dressing Type Tape;Transparent 07/1955   Hub Color/Line Status Infusing;Flushed;Patent 07/1955   Action Taken Open ports on tubing capped 07/1955   Alcohol Cap Used Yes 07/1955        Opportunity for questions and clarification was provided. Patient transported with:   Transport-only appropriate    New KEANU drain to mid-abdomen. Gauze and tegaderm dressing applied.     Author Celestine, RN

## 2021-07-16 NOTE — PROCEDURES
INTERVENTIONAL RADIOLOGY    Procedure: CT Guided Abscess Drainage    Pre-operative Diagnosis:  Diverticular abscess    Post-operative Diagnosis: same    Sedation: Versed and fentanyl. Procedure Details: Informed consent obtained prior to procedure. Standard aseptic technique. CT guidance used for catheter placement. 1% lidocaine for local anesthesia. 12 Fr locking pigtail APDL. 15cc serosanguinous purulent fluid, sample sent. Please see final dictated report for full details. Specimen: sent for culture. Complications:  No immediate    EBL: Minimal           Disposition: bhagat for monitoring           Condition: Stable    Impression:    Successful drainage catheter placement. Recommendations:  Leave drain to J-P suction. Repeat CT in a few days.        Yaakov Cushing, MD  [facility]  Vascular & Interventional Radiology  7/16/2021

## 2021-07-17 LAB
ALBUMIN SERPL-MCNC: 2.7 G/DL (ref 3.4–5)
ALBUMIN/GLOB SERPL: 0.8 {RATIO} (ref 0.8–1.7)
ALP SERPL-CCNC: 47 U/L (ref 45–117)
ALT SERPL-CCNC: 19 U/L (ref 16–61)
ANION GAP SERPL CALC-SCNC: 5 MMOL/L (ref 3–18)
AST SERPL-CCNC: 9 U/L (ref 10–38)
BASOPHILS # BLD: 0.1 K/UL (ref 0–0.1)
BASOPHILS NFR BLD: 1 % (ref 0–2)
BILIRUB SERPL-MCNC: 0.8 MG/DL (ref 0.2–1)
BUN SERPL-MCNC: 7 MG/DL (ref 7–18)
BUN/CREAT SERPL: 11 (ref 12–20)
CALCIUM SERPL-MCNC: 8.5 MG/DL (ref 8.5–10.1)
CHLORIDE SERPL-SCNC: 109 MMOL/L (ref 100–111)
CO2 SERPL-SCNC: 27 MMOL/L (ref 21–32)
CREAT SERPL-MCNC: 0.63 MG/DL (ref 0.6–1.3)
DIFFERENTIAL METHOD BLD: ABNORMAL
EOSINOPHIL # BLD: 0.4 K/UL (ref 0–0.4)
EOSINOPHIL NFR BLD: 5 % (ref 0–5)
ERYTHROCYTE [DISTWIDTH] IN BLOOD BY AUTOMATED COUNT: 15.6 % (ref 11.6–14.5)
GLOBULIN SER CALC-MCNC: 3.3 G/DL (ref 2–4)
GLUCOSE SERPL-MCNC: 85 MG/DL (ref 74–99)
HCT VFR BLD AUTO: 36.3 % (ref 36–48)
HGB BLD-MCNC: 11.6 G/DL (ref 13–16)
LYMPHOCYTES # BLD: 1 K/UL (ref 0.9–3.6)
LYMPHOCYTES NFR BLD: 13 % (ref 21–52)
MCH RBC QN AUTO: 26.5 PG (ref 24–34)
MCHC RBC AUTO-ENTMCNC: 32 G/DL (ref 31–37)
MCV RBC AUTO: 83.1 FL (ref 74–97)
MONOCYTES # BLD: 0.8 K/UL (ref 0.05–1.2)
MONOCYTES NFR BLD: 11 % (ref 3–10)
NEUTS SEG # BLD: 5.3 K/UL (ref 1.8–8)
NEUTS SEG NFR BLD: 71 % (ref 40–73)
PLATELET # BLD AUTO: 307 K/UL (ref 135–420)
PMV BLD AUTO: 9.2 FL (ref 9.2–11.8)
POTASSIUM SERPL-SCNC: 4.1 MMOL/L (ref 3.5–5.5)
PROT SERPL-MCNC: 6 G/DL (ref 6.4–8.2)
RBC # BLD AUTO: 4.37 M/UL (ref 4.35–5.65)
SODIUM SERPL-SCNC: 141 MMOL/L (ref 136–145)
WBC # BLD AUTO: 7.4 K/UL (ref 4.6–13.2)

## 2021-07-17 PROCEDURE — 36415 COLL VENOUS BLD VENIPUNCTURE: CPT

## 2021-07-17 PROCEDURE — 74011250636 HC RX REV CODE- 250/636: Performed by: HOSPITALIST

## 2021-07-17 PROCEDURE — 74011250637 HC RX REV CODE- 250/637: Performed by: HOSPITALIST

## 2021-07-17 PROCEDURE — 80053 COMPREHEN METABOLIC PANEL: CPT

## 2021-07-17 PROCEDURE — 65270000029 HC RM PRIVATE

## 2021-07-17 PROCEDURE — 85025 COMPLETE CBC W/AUTO DIFF WBC: CPT

## 2021-07-17 PROCEDURE — 74011000258 HC RX REV CODE- 258: Performed by: HOSPITALIST

## 2021-07-17 RX ORDER — MORPHINE SULFATE 2 MG/ML
1 INJECTION, SOLUTION INTRAMUSCULAR; INTRAVENOUS
Status: DISCONTINUED | OUTPATIENT
Start: 2021-07-17 | End: 2021-07-18 | Stop reason: HOSPADM

## 2021-07-17 RX ADMIN — PIPERACILLIN AND TAZOBACTAM 4.5 G: 4; .5 INJECTION, POWDER, LYOPHILIZED, FOR SOLUTION INTRAVENOUS; PARENTERAL at 03:32

## 2021-07-17 RX ADMIN — SODIUM CHLORIDE 50 ML/HR: 900 INJECTION, SOLUTION INTRAVENOUS at 03:32

## 2021-07-17 RX ADMIN — PIPERACILLIN AND TAZOBACTAM 4.5 G: 4; .5 INJECTION, POWDER, LYOPHILIZED, FOR SOLUTION INTRAVENOUS; PARENTERAL at 22:52

## 2021-07-17 RX ADMIN — PIPERACILLIN AND TAZOBACTAM 4.5 G: 4; .5 INJECTION, POWDER, LYOPHILIZED, FOR SOLUTION INTRAVENOUS; PARENTERAL at 08:27

## 2021-07-17 RX ADMIN — METRONIDAZOLE 500 MG: 500 INJECTION, SOLUTION INTRAVENOUS at 09:24

## 2021-07-17 RX ADMIN — Medication 500 MG: at 22:53

## 2021-07-17 RX ADMIN — METRONIDAZOLE 500 MG: 500 INJECTION, SOLUTION INTRAVENOUS at 16:08

## 2021-07-17 RX ADMIN — Medication 500 MG: at 08:28

## 2021-07-17 RX ADMIN — METRONIDAZOLE 500 MG: 500 INJECTION, SOLUTION INTRAVENOUS at 23:49

## 2021-07-17 RX ADMIN — PIPERACILLIN AND TAZOBACTAM 4.5 G: 4; .5 INJECTION, POWDER, LYOPHILIZED, FOR SOLUTION INTRAVENOUS; PARENTERAL at 15:08

## 2021-07-17 RX ADMIN — FAMOTIDINE 20 MG: 20 TABLET ORAL at 08:28

## 2021-07-17 RX ADMIN — SODIUM CHLORIDE 50 ML/HR: 900 INJECTION, SOLUTION INTRAVENOUS at 23:54

## 2021-07-17 NOTE — PROGRESS NOTES
Problem: Falls - Risk of  Goal: *Absence of Falls  Description: Document Tri Romero Fall Risk and appropriate interventions in the flowsheet.   Outcome: Progressing Towards Goal  Note: Fall Risk Interventions:            Medication Interventions: Teach patient to arise slowly

## 2021-07-17 NOTE — PROGRESS NOTES
Progress Note    Patient: Gavin Sawant MRN: [id number]  SSN: [id number]    YOB: 1955  Age: 61 y.o. Sex: male      Admit Date: 7/15/2021    * No surgery found *    Procedure:      Subjective:     Patient has no new complaints. Kristi reg diet. No n/v or burping.  +loose BM w flatus    Objective:     Visit Vitals  BP (!) 151/73 (BP 1 Location: Left upper arm, BP Patient Position: At rest)   Pulse 70   Temp 98 °F (36.7 °C)   Resp 16   Ht 5' 11\" (1.803 m)   Wt 107.8 kg (237 lb 9.6 oz)   SpO2 98%   BMI 33.14 kg/m²       Temp (24hrs), Av.7 °F (36.5 °C), Min:97.3 °F (36.3 °C), Max:98 °F (36.7 °C)  KEANU 30 cc yesterday    Physical Exam:    General:  Alert, cooperative, no distress. Lungs:   Clear to auscultation bilaterally. Heart:  Regular rate and rhythm. Abdomen:   Soft, min LLQ/catheter tenderness. No peritoneal signs. Bowel sounds normal.    Extremities: No calf tenderness. Data Review: images and reports reviewed    Lab Review: All lab results for the last 24 hours reviewed. WBC nl.  Elytes ok    Assessment:     Hospital Problems  Date Reviewed: 7/15/2021        Codes Class Noted POA    * (Principal) Abscess of sigmoid colon due to diverticulitis ICD-10-CM: K57.20  ICD-9-CM: 562.11, 569.5  7/15/2021 Yes              Plan/Recommendations/Medical Decision Making:     Continue present treatment. Sigmoid diverticulitis w abscess -- s/p IR perc drain . Reg diet as kristi. Cont Zosyn/Flagyl -- plan to change to PO cipro/flagyl, possibly tomorrow. Repeat labs in am.  OOB.

## 2021-07-17 NOTE — PROGRESS NOTES
0732  Bedside and verbal shift change report given to Devin Hernandez RN (on coming nurse) by CORINNA Singh RN (off going nurse). Report included the following information SBAR, Kardex, OR Summary, Intake/Output and MAR.    1938  Bedside and verbal shift change report given by JULEE Durán (off going nurse) to ABNER Agustin RN(on coming nurse). Report included the following information SBAR, Kardex, OR Summary, Intake/Output and MAR.

## 2021-07-18 VITALS
WEIGHT: 237.6 LBS | SYSTOLIC BLOOD PRESSURE: 141 MMHG | RESPIRATION RATE: 15 BRPM | TEMPERATURE: 98 F | DIASTOLIC BLOOD PRESSURE: 78 MMHG | OXYGEN SATURATION: 96 % | HEIGHT: 71 IN | HEART RATE: 69 BPM | BODY MASS INDEX: 33.26 KG/M2

## 2021-07-18 LAB
ALBUMIN SERPL-MCNC: 2.6 G/DL (ref 3.4–5)
ALBUMIN/GLOB SERPL: 0.8 {RATIO} (ref 0.8–1.7)
ALP SERPL-CCNC: 45 U/L (ref 45–117)
ALT SERPL-CCNC: 17 U/L (ref 16–61)
ANION GAP SERPL CALC-SCNC: 8 MMOL/L (ref 3–18)
AST SERPL-CCNC: 9 U/L (ref 10–38)
BASOPHILS # BLD: 0 K/UL (ref 0–0.1)
BASOPHILS NFR BLD: 0 % (ref 0–2)
BILIRUB SERPL-MCNC: 0.6 MG/DL (ref 0.2–1)
BUN SERPL-MCNC: 5 MG/DL (ref 7–18)
BUN/CREAT SERPL: 7 (ref 12–20)
CALCIUM SERPL-MCNC: 8.3 MG/DL (ref 8.5–10.1)
CHLORIDE SERPL-SCNC: 107 MMOL/L (ref 100–111)
CO2 SERPL-SCNC: 27 MMOL/L (ref 21–32)
CREAT SERPL-MCNC: 0.73 MG/DL (ref 0.6–1.3)
DIFFERENTIAL METHOD BLD: ABNORMAL
EOSINOPHIL # BLD: 0.4 K/UL (ref 0–0.4)
EOSINOPHIL NFR BLD: 6 % (ref 0–5)
ERYTHROCYTE [DISTWIDTH] IN BLOOD BY AUTOMATED COUNT: 15.7 % (ref 11.6–14.5)
GLOBULIN SER CALC-MCNC: 3.4 G/DL (ref 2–4)
GLUCOSE SERPL-MCNC: 104 MG/DL (ref 74–99)
HCT VFR BLD AUTO: 35.4 % (ref 36–48)
HGB BLD-MCNC: 11.4 G/DL (ref 13–16)
LYMPHOCYTES # BLD: 1.4 K/UL (ref 0.9–3.6)
LYMPHOCYTES NFR BLD: 19 % (ref 21–52)
MCH RBC QN AUTO: 26.6 PG (ref 24–34)
MCHC RBC AUTO-ENTMCNC: 32.2 G/DL (ref 31–37)
MCV RBC AUTO: 82.5 FL (ref 74–97)
MONOCYTES # BLD: 1.2 K/UL (ref 0.05–1.2)
MONOCYTES NFR BLD: 16 % (ref 3–10)
NEUTS SEG # BLD: 4.5 K/UL (ref 1.8–8)
NEUTS SEG NFR BLD: 60 % (ref 40–73)
PLATELET # BLD AUTO: 307 K/UL (ref 135–420)
PMV BLD AUTO: 9.3 FL (ref 9.2–11.8)
POTASSIUM SERPL-SCNC: 3.9 MMOL/L (ref 3.5–5.5)
PROT SERPL-MCNC: 6 G/DL (ref 6.4–8.2)
RBC # BLD AUTO: 4.29 M/UL (ref 4.35–5.65)
SODIUM SERPL-SCNC: 142 MMOL/L (ref 136–145)
WBC # BLD AUTO: 7.6 K/UL (ref 4.6–13.2)

## 2021-07-18 PROCEDURE — 80053 COMPREHEN METABOLIC PANEL: CPT

## 2021-07-18 PROCEDURE — 36415 COLL VENOUS BLD VENIPUNCTURE: CPT

## 2021-07-18 PROCEDURE — 85025 COMPLETE CBC W/AUTO DIFF WBC: CPT

## 2021-07-18 PROCEDURE — 74011250636 HC RX REV CODE- 250/636: Performed by: HOSPITALIST

## 2021-07-18 PROCEDURE — 74011250637 HC RX REV CODE- 250/637: Performed by: HOSPITALIST

## 2021-07-18 PROCEDURE — 74011000258 HC RX REV CODE- 258: Performed by: HOSPITALIST

## 2021-07-18 PROCEDURE — 74011250637 HC RX REV CODE- 250/637: Performed by: INTERNAL MEDICINE

## 2021-07-18 RX ORDER — AMLODIPINE BESYLATE 5 MG/1
5 TABLET ORAL DAILY
Qty: 30 TABLET | Refills: 0 | Status: SHIPPED | OUTPATIENT
Start: 2021-07-18 | End: 2022-04-06 | Stop reason: CLARIF

## 2021-07-18 RX ORDER — AMLODIPINE BESYLATE 5 MG/1
5 TABLET ORAL DAILY
Status: DISCONTINUED | OUTPATIENT
Start: 2021-07-18 | End: 2021-07-18 | Stop reason: HOSPADM

## 2021-07-18 RX ORDER — SAME BUTANEDISULFONATE/BETAINE 400-600 MG
500 POWDER IN PACKET (EA) ORAL 2 TIMES DAILY
Qty: 28 CAPSULE | Refills: 0 | Status: SHIPPED | OUTPATIENT
Start: 2021-07-18 | End: 2021-07-25

## 2021-07-18 RX ORDER — METRONIDAZOLE 500 MG/1
500 TABLET ORAL 3 TIMES DAILY
Qty: 30 TABLET | Refills: 0 | Status: SHIPPED | OUTPATIENT
Start: 2021-07-18 | End: 2021-07-28

## 2021-07-18 RX ORDER — CIPROFLOXACIN 500 MG/1
500 TABLET ORAL 2 TIMES DAILY
Qty: 20 TABLET | Refills: 0 | Status: SHIPPED | OUTPATIENT
Start: 2021-07-18 | End: 2021-07-28

## 2021-07-18 RX ADMIN — PIPERACILLIN AND TAZOBACTAM 4.5 G: 4; .5 INJECTION, POWDER, LYOPHILIZED, FOR SOLUTION INTRAVENOUS; PARENTERAL at 03:29

## 2021-07-18 RX ADMIN — Medication 500 MG: at 08:42

## 2021-07-18 RX ADMIN — METRONIDAZOLE 500 MG: 500 INJECTION, SOLUTION INTRAVENOUS at 08:42

## 2021-07-18 RX ADMIN — AMLODIPINE BESYLATE 5 MG: 5 TABLET ORAL at 08:43

## 2021-07-18 RX ADMIN — PIPERACILLIN AND TAZOBACTAM 4.5 G: 4; .5 INJECTION, POWDER, LYOPHILIZED, FOR SOLUTION INTRAVENOUS; PARENTERAL at 09:55

## 2021-07-18 RX ADMIN — FAMOTIDINE 20 MG: 20 TABLET ORAL at 08:43

## 2021-07-18 NOTE — PROGRESS NOTES
8958  Bedside and verbal shift change report given to Dilcia Altamirano RN (on coming nurse) by ABNER Blake RN (off going nurse). Report included the following information SBAR, Kardex, OR Summary, Intake/Output and MAR.    1125  KRISTAN Bains RN-  AVS review with pt, opportunity for questions and concerns at this time. D/c IV clean and dry. Properly discarded armbands. Pt goes home w/ KEANU drain per Dr. Lewis .

## 2021-07-18 NOTE — PROGRESS NOTES
Problem: General Medical Care Plan  Goal: *Vital signs within specified parameters  Outcome: Progressing Towards Goal  Goal: *Labs within defined limits  Outcome: Progressing Towards Goal  Goal: *Absence of infection signs and symptoms  Outcome: Progressing Towards Goal  Goal: *Optimal pain control at patient's stated goal  Outcome: Progressing Towards Goal  Goal: *Skin integrity maintained  Outcome: Progressing Towards Goal  Goal: *Fluid volume balance  Outcome: Progressing Towards Goal  Goal: *Optimize nutritional status  Outcome: Progressing Towards Goal  Goal: *Anxiety reduced or absent  Outcome: Progressing Towards Goal  Goal: *Progressive mobility and function (eg: ADL's)  Outcome: Progressing Towards Goal     Problem: Patient Education: Go to Patient Education Activity  Goal: Patient/Family Education  Outcome: Progressing Towards Goal     Problem: Falls - Risk of  Goal: *Absence of Falls  Description: Document Shanae Fall Risk and appropriate interventions in the flowsheet.   Outcome: Progressing Towards Goal  Note: Fall Risk Interventions:            Medication Interventions: Teach patient to arise slowly

## 2021-07-18 NOTE — DISCHARGE SUMMARY
Discharge Summary  Subjective:       Admit Date: 7/15/2021  Discharge Date:  7/18/2021, 8:45 AM    Discharging Physician: Mik Solorzano pager [phone]  Primary Care Provider:  Jeromy Mcdonald MD    Patient ID:  [facility]  61 y.o. male  1955    Reason for Admission:  Abscess of sigmoid colon due to diverticulitis [K57.20]    Discharge Diagnosis:   1. Acute diverticulitis w abscess sp drainage  2. HTn  3. Abdominal pain      Patient Active Problem List   Diagnosis Code    Abscess of sigmoid colon due to diverticulitis K57.20       Consultants:    General surgery  IR    Hospital Course:   Reason for admission ( Admitting HPI): \" The patient is 61years old. He is a gentleman who has a past medical history of high cholesterol and was set up for surgery on a periumbilical hernia this past Monday. He was examined preoperatively and shown also to have an undiagnosed abdominal mass during that exam and subsequently was set up to have a CT scan, which was done this morning that showed a large contained diverticular abscess, and thus the patient was advised to come to the emergency room. His last colonoscopy was three years ago. It was normal.  He denies any notable abdominal pain. Also, he feels like he has had some discomfort recently. His wife states his bowel habits have changed significantly over the past five to six months. He has a notable change in his bowel habits as well as odor to his bowel movements. He has had no vomiting, no fevers, no chills, no myalgias. He has been eating and drinking as normally as he usually does per his report. The abdominal pain has not kept him from going to work. He denies any dysuria or hematuria. PCP is Dr. Henry Carmona"    He was admitted to the hospitalist service. He had drain placed which he toelraetd. He toelrated IV antibiotics and is toelrating a regalur diet.  He is stable for DC home w interval CT abdomen ( order placed) and w follow up w Dr Mamadou Roy and PCP. All questions answered      Pertinent Imaging/ Operative procedures:   CT abdomen:\" IMPRESSION  CT-guided drainage of diverticular abscess. \"    Pertinent Results:    CMP:   Lab Results   Component Value Date/Time     07/18/2021 05:18 AM    K 3.9 07/18/2021 05:18 AM     07/18/2021 05:18 AM    CO2 27 07/18/2021 05:18 AM    AGAP 8 07/18/2021 05:18 AM     (H) 07/18/2021 05:18 AM    BUN 5 (L) 07/18/2021 05:18 AM    CREA 0.73 07/18/2021 05:18 AM    GFRAA >60 07/18/2021 05:18 AM    GFRNA >60 07/18/2021 05:18 AM    CA 8.3 (L) 07/18/2021 05:18 AM    ALB 2.6 (L) 07/18/2021 05:18 AM    TP 6.0 (L) 07/18/2021 05:18 AM    GLOB 3.4 07/18/2021 05:18 AM    AGRAT 0.8 07/18/2021 05:18 AM    ALT 17 07/18/2021 05:18 AM     CBC:   Lab Results   Component Value Date/Time    WBC 7.6 07/18/2021 05:18 AM    HGB 11.4 (L) 07/18/2021 05:18 AM    HCT 35.4 (L) 07/18/2021 05:18 AM     07/18/2021 05:18 AM         Physical Exam on Discharge:  Visit Vitals  BP (!) 141/78 (BP 1 Location: Left upper arm, BP Patient Position: At rest)   Pulse 69   Temp 98 °F (36.7 °C)   Resp 15   Ht 5' 11\" (1.803 m)   Wt 107.8 kg (237 lb 9.6 oz)   SpO2 96%   BMI 33.14 kg/m²     Body mass index is 33.14 kg/m². GEN:NAD  HEART: S1 S2  NEURO: nonfocal   Condition at discharge: stable    Discharge Medications  Current Discharge Medication List      START taking these medications    Details   amLODIPine (NORVASC) 5 mg tablet Take 1 Tablet by mouth daily. Qty: 30 Tablet, Refills: 0      Saccharomyces boulardii (FLORASTOR) 250 mg capsule Take 2 Capsules by mouth two (2) times a day for 7 days. Qty: 28 Capsule, Refills: 0      ciprofloxacin HCl (Cipro) 500 mg tablet Take 1 Tablet by mouth two (2) times a day for 10 days. Qty: 20 Tablet, Refills: 0      metroNIDAZOLE (FlagyL) 500 mg tablet Take 1 Tablet by mouth three (3) times daily for 10 days.   Qty: 30 Tablet, Refills: 0         CONTINUE these medications which have NOT CHANGED Details   atorvastatin (LIPITOR) 10 mg tablet Take 10 mg by mouth daily.  Indications: excessive fat in the blood             Disposition: home   Follow up:  Pcp, gen surg  Restrictions: none    Diagnostic Imaging/Labs pending at discharge: none      Indira Messer, [address] [facility]  Internal Medicine/Geriatrics    Time Spent 40 minutes with >50% coordination of care          CC: Sharita Elder MD

## 2021-07-18 NOTE — PROGRESS NOTES
Problem: Falls - Risk of  Goal: *Absence of Falls  Description: Document Paco Galvez Fall Risk and appropriate interventions in the flowsheet.   Outcome: Progressing Towards Goal  Note: Fall Risk Interventions:            Medication Interventions: Teach patient to arise slowly

## 2021-07-18 NOTE — ROUTINE PROCESS
Bedside and Verbal shift change report given to ABNER Greco RN (oncoming nurse) by Sherley Montgomery RN (offgoing nurse). Report included the following information SBAR, Kardex, Intake/Output, MAR and Recent Results. Introduced self to pt, updated whiteboard. No c/o pain/discomfort at this time. NAD noted. Call bell within reach.

## 2021-07-20 LAB
BACTERIA SPEC CULT: ABNORMAL
GRAM STN SPEC: ABNORMAL
SERVICE CMNT-IMP: ABNORMAL

## 2021-07-21 LAB
BACTERIA SPEC CULT: NORMAL
BACTERIA SPEC CULT: NORMAL
SERVICE CMNT-IMP: NORMAL
SERVICE CMNT-IMP: NORMAL

## 2021-07-23 ENCOUNTER — TRANSCRIBE ORDER (OUTPATIENT)
Dept: SCHEDULING | Age: 66
End: 2021-07-23

## 2021-07-23 DIAGNOSIS — K57.20 PERFORATED DIVERTICULUM OF LARGE INTESTINE: Primary | ICD-10-CM

## 2021-07-23 NOTE — PROGRESS NOTES
Pt scheduled for drain check on Mon July 26 at 1000, will arrive to radiology waiting room at 0930.  Pt okay to take medications morning of exam.

## 2021-07-26 ENCOUNTER — HOSPITAL ENCOUNTER (OUTPATIENT)
Dept: INTERVENTIONAL RADIOLOGY/VASCULAR | Age: 66
Discharge: HOME | End: 2021-07-26
Attending: SURGERY
Payer: COMMERCIAL

## 2021-07-26 DIAGNOSIS — K57.20 PERFORATED DIVERTICULUM OF LARGE INTESTINE: ICD-10-CM

## 2021-07-26 PROCEDURE — 76080 X-RAY EXAM OF FISTULA: CPT

## 2021-07-26 PROCEDURE — 74011000636 HC RX REV CODE- 636: Performed by: SURGERY

## 2021-07-26 RX ADMIN — IOPAMIDOL 15 ML: 612 INJECTION, SOLUTION INTRAVENOUS at 10:14

## 2021-08-09 ENCOUNTER — HOSPITAL ENCOUNTER (OUTPATIENT)
Dept: CT IMAGING | Age: 66
Discharge: HOME | End: 2021-08-09
Attending: INTERNAL MEDICINE
Payer: COMMERCIAL

## 2021-08-09 PROCEDURE — 74011000636 HC RX REV CODE- 636: Performed by: INTERNAL MEDICINE

## 2021-08-09 PROCEDURE — 74177 CT ABD & PELVIS W/CONTRAST: CPT

## 2021-08-09 PROCEDURE — 74011000250 HC RX REV CODE- 250: Performed by: INTERNAL MEDICINE

## 2021-08-09 RX ORDER — BARIUM SULFATE 20 MG/ML
900 SUSPENSION ORAL
Status: COMPLETED | OUTPATIENT
Start: 2021-08-09 | End: 2021-08-09

## 2021-08-09 RX ADMIN — BARIUM SULFATE 900 ML: 20 SUSPENSION ORAL at 15:41

## 2021-08-09 RX ADMIN — IOPAMIDOL 100 ML: 612 INJECTION, SOLUTION INTRAVENOUS at 15:41

## 2021-08-13 ENCOUNTER — HOSPITAL ENCOUNTER (OUTPATIENT)
Dept: PREADMISSION TESTING | Age: 66
Discharge: HOME | End: 2021-08-13
Payer: COMMERCIAL

## 2021-08-13 PROCEDURE — U0003 INFECTIOUS AGENT DETECTION BY NUCLEIC ACID (DNA OR RNA); SEVERE ACUTE RESPIRATORY SYNDROME CORONAVIRUS 2 (SARS-COV-2) (CORONAVIRUS DISEASE [COVID-19]), AMPLIFIED PROBE TECHNIQUE, MAKING USE OF HIGH THROUGHPUT TECHNOLOGIES AS DESCRIBED BY CMS-2020-01-R: HCPCS

## 2021-08-14 LAB — SARS-COV-2, COV2NT: NOT DETECTED

## 2021-08-15 ENCOUNTER — ANESTHESIA EVENT (OUTPATIENT)
Dept: SURGERY | Age: 66
DRG: 329 | End: 2021-08-15
Payer: COMMERCIAL

## 2021-08-16 ENCOUNTER — ANESTHESIA (OUTPATIENT)
Dept: SURGERY | Age: 66
DRG: 329 | End: 2021-08-16
Payer: COMMERCIAL

## 2021-08-16 ENCOUNTER — HOSPITAL ENCOUNTER (INPATIENT)
Age: 66
LOS: 11 days | Discharge: HOME HEALTH | DRG: 329 | End: 2021-08-27
Attending: SURGERY | Admitting: SURGERY
Payer: COMMERCIAL

## 2021-08-16 PROBLEM — K57.20 DIVERTICULITIS OF LARGE INTESTINE WITH ABSCESS: Status: ACTIVE | Noted: 2021-08-16

## 2021-08-16 LAB
ABO + RH BLD: NORMAL
BLOOD GROUP ANTIBODIES SERPL: NORMAL
SPECIMEN EXP DATE BLD: NORMAL

## 2021-08-16 PROCEDURE — 77030027138 HC INCENT SPIROMETER -A: Performed by: SURGERY

## 2021-08-16 PROCEDURE — 77030006643: Performed by: ANESTHESIOLOGY

## 2021-08-16 PROCEDURE — 76060000046 HC ANESTHESIA 7.5 TO 8 HR: Performed by: SURGERY

## 2021-08-16 PROCEDURE — 36415 COLL VENOUS BLD VENIPUNCTURE: CPT

## 2021-08-16 PROCEDURE — 97535 SELF CARE MNGMENT TRAINING: CPT

## 2021-08-16 PROCEDURE — 74011000258 HC RX REV CODE- 258: Performed by: SURGERY

## 2021-08-16 PROCEDURE — 77030040361 HC SLV COMPR DVT MDII -B: Performed by: SURGERY

## 2021-08-16 PROCEDURE — 65270000029 HC RM PRIVATE

## 2021-08-16 PROCEDURE — 97116 GAIT TRAINING THERAPY: CPT

## 2021-08-16 PROCEDURE — 88305 TISSUE EXAM BY PATHOLOGIST: CPT

## 2021-08-16 PROCEDURE — 77030020703 HC SEAL CANN DISP INTU -B: Performed by: SURGERY

## 2021-08-16 PROCEDURE — 74011250636 HC RX REV CODE- 250/636: Performed by: NURSE ANESTHETIST, CERTIFIED REGISTERED

## 2021-08-16 PROCEDURE — 97162 PT EVAL MOD COMPLEX 30 MIN: CPT

## 2021-08-16 PROCEDURE — 77030031492 HC PRT ACC BLNT AIRSEAL CNMD -B: Performed by: SURGERY

## 2021-08-16 PROCEDURE — 76210000017 HC OR PH I REC 1.5 TO 2 HR: Performed by: SURGERY

## 2021-08-16 PROCEDURE — 77030035279 HC SEAL VSL ENDOWR XI INTU -I2: Performed by: SURGERY

## 2021-08-16 PROCEDURE — 74011250636 HC RX REV CODE- 250/636: Performed by: SURGERY

## 2021-08-16 PROCEDURE — 77030033200 HC PRT CLSR CRTR THOMP COOP -C: Performed by: SURGERY

## 2021-08-16 PROCEDURE — 77030009403 HC ELECTRD ENDO MEGA -B: Performed by: SURGERY

## 2021-08-16 PROCEDURE — 74011000250 HC RX REV CODE- 250: Performed by: SURGERY

## 2021-08-16 PROCEDURE — 77030008462 HC STPLR SKN PROX J&J -A: Performed by: SURGERY

## 2021-08-16 PROCEDURE — 77030008574 HC TBNG SUC IRR STRY -B: Performed by: SURGERY

## 2021-08-16 PROCEDURE — 88304 TISSUE EXAM BY PATHOLOGIST: CPT

## 2021-08-16 PROCEDURE — 77030008477 HC STYL SATN SLP COVD -A: Performed by: ANESTHESIOLOGY

## 2021-08-16 PROCEDURE — 0DTN4ZZ RESECTION OF SIGMOID COLON, PERCUTANEOUS ENDOSCOPIC APPROACH: ICD-10-PCS | Performed by: SURGERY

## 2021-08-16 PROCEDURE — 77030014008 HC SPNG HEMSTAT J&J -C: Performed by: SURGERY

## 2021-08-16 PROCEDURE — 77030016151 HC PROTCTR LNS DFOG COVD -B: Performed by: SURGERY

## 2021-08-16 PROCEDURE — 77030040506 HC DRN WND MDII -A: Performed by: SURGERY

## 2021-08-16 PROCEDURE — 2709999900 HC NON-CHARGEABLE SUPPLY: Performed by: SURGERY

## 2021-08-16 PROCEDURE — 77030039404: Performed by: SURGERY

## 2021-08-16 PROCEDURE — 77030010507 HC ADH SKN DERMBND J&J -B: Performed by: SURGERY

## 2021-08-16 PROCEDURE — 77030032523 HC RELD STPL PK ENDORS INTU -C: Performed by: SURGERY

## 2021-08-16 PROCEDURE — 77030035277 HC OBTRTR BLDELSS DISP INTU -B: Performed by: SURGERY

## 2021-08-16 PROCEDURE — 97165 OT EVAL LOW COMPLEX 30 MIN: CPT

## 2021-08-16 PROCEDURE — 0DBP4ZZ EXCISION OF RECTUM, PERCUTANEOUS ENDOSCOPIC APPROACH: ICD-10-PCS | Performed by: SURGERY

## 2021-08-16 PROCEDURE — 8E0W4CZ ROBOTIC ASSISTED PROCEDURE OF TRUNK REGION, PERCUTANEOUS ENDOSCOPIC APPROACH: ICD-10-PCS | Performed by: SURGERY

## 2021-08-16 PROCEDURE — 77030039147 HC PWDR HEMSTS SURGICEL JNJ -D: Performed by: SURGERY

## 2021-08-16 PROCEDURE — 77030033138 HC SUT PGA STRATFX J&J -B: Performed by: SURGERY

## 2021-08-16 PROCEDURE — 77030035489 HC REDUCR CANN ENDOWR INTU -C: Performed by: SURGERY

## 2021-08-16 PROCEDURE — 88307 TISSUE EXAM BY PATHOLOGIST: CPT

## 2021-08-16 PROCEDURE — 74011250637 HC RX REV CODE- 250/637: Performed by: SURGERY

## 2021-08-16 PROCEDURE — 77030034479 HC ADH SKN CLSR PRINEO J&J -B: Performed by: SURGERY

## 2021-08-16 PROCEDURE — 77030040923 HC STPLR ENDOSC ECHELON J&J -E: Performed by: SURGERY

## 2021-08-16 PROCEDURE — 77010033678 HC OXYGEN DAILY

## 2021-08-16 PROCEDURE — 74011250636 HC RX REV CODE- 250/636: Performed by: ANESTHESIOLOGY

## 2021-08-16 PROCEDURE — 77030040830 HC CATH URETH FOL MDII -A: Performed by: SURGERY

## 2021-08-16 PROCEDURE — 77030008683 HC TU ET CUF COVD -A: Performed by: ANESTHESIOLOGY

## 2021-08-16 PROCEDURE — 0D9W4ZZ DRAINAGE OF PERITONEUM, PERCUTANEOUS ENDOSCOPIC APPROACH: ICD-10-PCS | Performed by: SURGERY

## 2021-08-16 PROCEDURE — 77030002966 HC SUT PDS J&J -A: Performed by: SURGERY

## 2021-08-16 PROCEDURE — 0DSL4ZZ REPOSITION TRANSVERSE COLON, PERCUTANEOUS ENDOSCOPIC APPROACH: ICD-10-PCS | Performed by: SURGERY

## 2021-08-16 PROCEDURE — 77030009527 HC GEL PRT SYS AMR -E: Performed by: SURGERY

## 2021-08-16 PROCEDURE — 77030002916 HC SUT ETHLN J&J -A: Performed by: SURGERY

## 2021-08-16 PROCEDURE — 74011000250 HC RX REV CODE- 250: Performed by: NURSE ANESTHETIST, CERTIFIED REGISTERED

## 2021-08-16 PROCEDURE — 88302 TISSUE EXAM BY PATHOLOGIST: CPT

## 2021-08-16 PROCEDURE — 86901 BLOOD TYPING SEROLOGIC RH(D): CPT

## 2021-08-16 PROCEDURE — 77030020782 HC GWN BAIR PAWS FLX 3M -B: Performed by: SURGERY

## 2021-08-16 PROCEDURE — 77030002933 HC SUT MCRYL J&J -A: Performed by: SURGERY

## 2021-08-16 PROCEDURE — 77030035278 HC STPLR SEAL ENDOWR INTU -B: Performed by: SURGERY

## 2021-08-16 PROCEDURE — P9047 ALBUMIN (HUMAN), 25%, 50ML: HCPCS | Performed by: NURSE ANESTHETIST, CERTIFIED REGISTERED

## 2021-08-16 PROCEDURE — 0DUP47Z SUPPLEMENT RECTUM WITH AUTOLOGOUS TISSUE SUBSTITUTE, PERCUTANEOUS ENDOSCOPIC APPROACH: ICD-10-PCS | Performed by: SURGERY

## 2021-08-16 PROCEDURE — 76010000887 HC OR TIME 7.5 TO 8HR INTENSV - TIER 2: Performed by: SURGERY

## 2021-08-16 PROCEDURE — 0TJB8ZZ INSPECTION OF BLADDER, VIA NATURAL OR ARTIFICIAL OPENING ENDOSCOPIC: ICD-10-PCS | Performed by: SURGERY

## 2021-08-16 PROCEDURE — 77030002986 HC SUT PROL J&J -A: Performed by: SURGERY

## 2021-08-16 PROCEDURE — 77030032522 HC SHT STPL PK ENDOWR INTU -B: Performed by: SURGERY

## 2021-08-16 PROCEDURE — 77030040504 HC DRN WND MDII -B: Performed by: SURGERY

## 2021-08-16 PROCEDURE — 77030031139 HC SUT VCRL2 J&J -A: Performed by: SURGERY

## 2021-08-16 PROCEDURE — 77030032490 HC SLV COMPR SCD KNE COVD -B: Performed by: SURGERY

## 2021-08-16 PROCEDURE — C9290 INJ, BUPIVACAINE LIPOSOME: HCPCS | Performed by: SURGERY

## 2021-08-16 RX ORDER — GLYCOPYRROLATE 0.2 MG/ML
INJECTION INTRAMUSCULAR; INTRAVENOUS AS NEEDED
Status: DISCONTINUED | OUTPATIENT
Start: 2021-08-16 | End: 2021-08-16 | Stop reason: HOSPADM

## 2021-08-16 RX ORDER — FENTANYL CITRATE 50 UG/ML
25 INJECTION, SOLUTION INTRAMUSCULAR; INTRAVENOUS AS NEEDED
Status: DISCONTINUED | OUTPATIENT
Start: 2021-08-16 | End: 2021-08-16 | Stop reason: HOSPADM

## 2021-08-16 RX ORDER — GABAPENTIN 100 MG/1
100 CAPSULE ORAL 3 TIMES DAILY
Status: DISCONTINUED | OUTPATIENT
Start: 2021-08-16 | End: 2021-08-27 | Stop reason: HOSPADM

## 2021-08-16 RX ORDER — HYDROMORPHONE HYDROCHLORIDE 2 MG/ML
INJECTION, SOLUTION INTRAMUSCULAR; INTRAVENOUS; SUBCUTANEOUS AS NEEDED
Status: DISCONTINUED | OUTPATIENT
Start: 2021-08-16 | End: 2021-08-16 | Stop reason: HOSPADM

## 2021-08-16 RX ORDER — ACETAMINOPHEN 500 MG
1000 TABLET ORAL 3 TIMES DAILY
Status: DISCONTINUED | OUTPATIENT
Start: 2021-08-16 | End: 2021-08-27 | Stop reason: HOSPADM

## 2021-08-16 RX ORDER — MORPHINE SULFATE 4 MG/ML
2 INJECTION INTRAVENOUS
Status: DISCONTINUED | OUTPATIENT
Start: 2021-08-16 | End: 2021-08-26

## 2021-08-16 RX ORDER — ONDANSETRON 2 MG/ML
INJECTION INTRAMUSCULAR; INTRAVENOUS AS NEEDED
Status: DISCONTINUED | OUTPATIENT
Start: 2021-08-16 | End: 2021-08-16 | Stop reason: HOSPADM

## 2021-08-16 RX ORDER — MAGNESIUM SULFATE HEPTAHYDRATE 40 MG/ML
2 INJECTION, SOLUTION INTRAVENOUS AS NEEDED
Status: DISCONTINUED | OUTPATIENT
Start: 2021-08-16 | End: 2021-08-27 | Stop reason: HOSPADM

## 2021-08-16 RX ORDER — KETOROLAC TROMETHAMINE 30 MG/ML
30 INJECTION, SOLUTION INTRAMUSCULAR; INTRAVENOUS EVERY 6 HOURS
Status: COMPLETED | OUTPATIENT
Start: 2021-08-16 | End: 2021-08-19

## 2021-08-16 RX ORDER — METRONIDAZOLE 500 MG/100ML
500 INJECTION, SOLUTION INTRAVENOUS
Status: COMPLETED | OUTPATIENT
Start: 2021-08-16 | End: 2021-08-16

## 2021-08-16 RX ORDER — ALBUMIN HUMAN 250 G/1000ML
SOLUTION INTRAVENOUS AS NEEDED
Status: DISCONTINUED | OUTPATIENT
Start: 2021-08-16 | End: 2021-08-16 | Stop reason: HOSPADM

## 2021-08-16 RX ORDER — SODIUM CHLORIDE, SODIUM LACTATE, POTASSIUM CHLORIDE, CALCIUM CHLORIDE 600; 310; 30; 20 MG/100ML; MG/100ML; MG/100ML; MG/100ML
125 INJECTION, SOLUTION INTRAVENOUS CONTINUOUS
Status: DISCONTINUED | OUTPATIENT
Start: 2021-08-16 | End: 2021-08-16 | Stop reason: HOSPADM

## 2021-08-16 RX ORDER — PROPOFOL 10 MG/ML
INJECTION, EMULSION INTRAVENOUS AS NEEDED
Status: DISCONTINUED | OUTPATIENT
Start: 2021-08-16 | End: 2021-08-16 | Stop reason: HOSPADM

## 2021-08-16 RX ORDER — SODIUM CHLORIDE 0.9 % (FLUSH) 0.9 %
5-40 SYRINGE (ML) INJECTION AS NEEDED
Status: DISCONTINUED | OUTPATIENT
Start: 2021-08-16 | End: 2021-08-16 | Stop reason: HOSPADM

## 2021-08-16 RX ORDER — SODIUM CHLORIDE 0.9 % (FLUSH) 0.9 %
5-40 SYRINGE (ML) INJECTION EVERY 8 HOURS
Status: DISCONTINUED | OUTPATIENT
Start: 2021-08-16 | End: 2021-08-27 | Stop reason: HOSPADM

## 2021-08-16 RX ORDER — NALOXONE HYDROCHLORIDE 0.4 MG/ML
0.1 INJECTION, SOLUTION INTRAMUSCULAR; INTRAVENOUS; SUBCUTANEOUS AS NEEDED
Status: DISCONTINUED | OUTPATIENT
Start: 2021-08-16 | End: 2021-08-16 | Stop reason: HOSPADM

## 2021-08-16 RX ORDER — HEPARIN SODIUM 5000 [USP'U]/ML
5000 INJECTION, SOLUTION INTRAVENOUS; SUBCUTANEOUS EVERY 8 HOURS
Status: DISCONTINUED | OUTPATIENT
Start: 2021-08-16 | End: 2021-08-21

## 2021-08-16 RX ORDER — LIDOCAINE HYDROCHLORIDE 20 MG/ML
INJECTION, SOLUTION EPIDURAL; INFILTRATION; INTRACAUDAL; PERINEURAL AS NEEDED
Status: DISCONTINUED | OUTPATIENT
Start: 2021-08-16 | End: 2021-08-16 | Stop reason: HOSPADM

## 2021-08-16 RX ORDER — PROCHLORPERAZINE EDISYLATE 5 MG/ML
5 INJECTION INTRAMUSCULAR; INTRAVENOUS
Status: DISCONTINUED | OUTPATIENT
Start: 2021-08-16 | End: 2021-08-16

## 2021-08-16 RX ORDER — POTASSIUM CHLORIDE 7.45 MG/ML
10 INJECTION INTRAVENOUS AS NEEDED
Status: DISCONTINUED | OUTPATIENT
Start: 2021-08-16 | End: 2021-08-20

## 2021-08-16 RX ORDER — SODIUM CHLORIDE 9 MG/ML
50 INJECTION, SOLUTION INTRAVENOUS CONTINUOUS
Status: DISCONTINUED | OUTPATIENT
Start: 2021-08-16 | End: 2021-08-17

## 2021-08-16 RX ORDER — DEXTROSE, SODIUM CHLORIDE, AND POTASSIUM CHLORIDE 5; .45; .15 G/100ML; G/100ML; G/100ML
100 INJECTION INTRAVENOUS CONTINUOUS
Status: DISCONTINUED | OUTPATIENT
Start: 2021-08-16 | End: 2021-08-17

## 2021-08-16 RX ORDER — SODIUM CHLORIDE 9 MG/ML
125 INJECTION, SOLUTION INTRAVENOUS CONTINUOUS
Status: DISCONTINUED | OUTPATIENT
Start: 2021-08-16 | End: 2021-08-17

## 2021-08-16 RX ORDER — MIDAZOLAM HYDROCHLORIDE 1 MG/ML
INJECTION, SOLUTION INTRAMUSCULAR; INTRAVENOUS AS NEEDED
Status: DISCONTINUED | OUTPATIENT
Start: 2021-08-16 | End: 2021-08-16 | Stop reason: HOSPADM

## 2021-08-16 RX ORDER — SODIUM CHLORIDE, SODIUM LACTATE, POTASSIUM CHLORIDE, CALCIUM CHLORIDE 600; 310; 30; 20 MG/100ML; MG/100ML; MG/100ML; MG/100ML
INJECTION, SOLUTION INTRAVENOUS
Status: DISCONTINUED | OUTPATIENT
Start: 2021-08-16 | End: 2021-08-16 | Stop reason: HOSPADM

## 2021-08-16 RX ORDER — METOCLOPRAMIDE HYDROCHLORIDE 5 MG/ML
5 INJECTION INTRAMUSCULAR; INTRAVENOUS EVERY 6 HOURS
Status: DISCONTINUED | OUTPATIENT
Start: 2021-08-16 | End: 2021-08-24

## 2021-08-16 RX ORDER — HEPARIN SODIUM 5000 [USP'U]/ML
5000 INJECTION, SOLUTION INTRAVENOUS; SUBCUTANEOUS
Status: COMPLETED | OUTPATIENT
Start: 2021-08-16 | End: 2021-08-16

## 2021-08-16 RX ORDER — KETOROLAC TROMETHAMINE 15 MG/ML
INJECTION, SOLUTION INTRAMUSCULAR; INTRAVENOUS AS NEEDED
Status: DISCONTINUED | OUTPATIENT
Start: 2021-08-16 | End: 2021-08-16 | Stop reason: HOSPADM

## 2021-08-16 RX ORDER — KETAMINE HYDROCHLORIDE 10 MG/ML
INJECTION, SOLUTION INTRAMUSCULAR; INTRAVENOUS AS NEEDED
Status: DISCONTINUED | OUTPATIENT
Start: 2021-08-16 | End: 2021-08-16 | Stop reason: HOSPADM

## 2021-08-16 RX ORDER — SODIUM CHLORIDE, SODIUM LACTATE, POTASSIUM CHLORIDE, CALCIUM CHLORIDE 600; 310; 30; 20 MG/100ML; MG/100ML; MG/100ML; MG/100ML
125 INJECTION, SOLUTION INTRAVENOUS CONTINUOUS
Status: DISCONTINUED | OUTPATIENT
Start: 2021-08-16 | End: 2021-08-16

## 2021-08-16 RX ORDER — DIPHENHYDRAMINE HYDROCHLORIDE 50 MG/ML
12.5 INJECTION, SOLUTION INTRAMUSCULAR; INTRAVENOUS
Status: DISCONTINUED | OUTPATIENT
Start: 2021-08-16 | End: 2021-08-16 | Stop reason: HOSPADM

## 2021-08-16 RX ORDER — METRONIDAZOLE 500 MG/100ML
500 INJECTION, SOLUTION INTRAVENOUS EVERY 12 HOURS
Status: DISCONTINUED | OUTPATIENT
Start: 2021-08-17 | End: 2021-08-21

## 2021-08-16 RX ORDER — HYDROMORPHONE HYDROCHLORIDE 1 MG/ML
0.5 INJECTION, SOLUTION INTRAMUSCULAR; INTRAVENOUS; SUBCUTANEOUS
Status: DISCONTINUED | OUTPATIENT
Start: 2021-08-16 | End: 2021-08-16 | Stop reason: HOSPADM

## 2021-08-16 RX ORDER — METRONIDAZOLE 500 MG/100ML
500 INJECTION, SOLUTION INTRAVENOUS EVERY 6 HOURS
Status: DISCONTINUED | OUTPATIENT
Start: 2021-08-16 | End: 2021-08-16 | Stop reason: SDUPTHER

## 2021-08-16 RX ORDER — SODIUM CHLORIDE 0.9 % (FLUSH) 0.9 %
5-40 SYRINGE (ML) INJECTION EVERY 8 HOURS
Status: DISCONTINUED | OUTPATIENT
Start: 2021-08-16 | End: 2021-08-16 | Stop reason: HOSPADM

## 2021-08-16 RX ORDER — ROCURONIUM BROMIDE 10 MG/ML
INJECTION, SOLUTION INTRAVENOUS AS NEEDED
Status: DISCONTINUED | OUTPATIENT
Start: 2021-08-16 | End: 2021-08-16 | Stop reason: HOSPADM

## 2021-08-16 RX ORDER — SODIUM CHLORIDE 0.9 % (FLUSH) 0.9 %
5-40 SYRINGE (ML) INJECTION AS NEEDED
Status: DISCONTINUED | OUTPATIENT
Start: 2021-08-16 | End: 2021-08-27 | Stop reason: HOSPADM

## 2021-08-16 RX ORDER — FLUMAZENIL 0.1 MG/ML
0.2 INJECTION INTRAVENOUS
Status: DISCONTINUED | OUTPATIENT
Start: 2021-08-16 | End: 2021-08-16 | Stop reason: HOSPADM

## 2021-08-16 RX ORDER — EPHEDRINE SULFATE/0.9% NACL/PF 50 MG/5 ML
SYRINGE (ML) INTRAVENOUS AS NEEDED
Status: DISCONTINUED | OUTPATIENT
Start: 2021-08-16 | End: 2021-08-16 | Stop reason: HOSPADM

## 2021-08-16 RX ADMIN — KETOROLAC TROMETHAMINE 30 MG: 30 INJECTION, SOLUTION INTRAMUSCULAR; INTRAVENOUS at 17:17

## 2021-08-16 RX ADMIN — PHENYLEPHRINE HYDROCHLORIDE 100 MCG: 10 INJECTION INTRAVENOUS at 13:59

## 2021-08-16 RX ADMIN — METRONIDAZOLE 500 MG: 500 INJECTION, SOLUTION INTRAVENOUS at 15:28

## 2021-08-16 RX ADMIN — WATER 2 G: 1 INJECTION INTRAMUSCULAR; INTRAVENOUS; SUBCUTANEOUS at 07:25

## 2021-08-16 RX ADMIN — ALBUMIN (HUMAN) 12.5 G: 5 SOLUTION INTRAVENOUS at 14:52

## 2021-08-16 RX ADMIN — SODIUM CHLORIDE 50 ML/HR: 900 INJECTION, SOLUTION INTRAVENOUS at 06:17

## 2021-08-16 RX ADMIN — PHENYLEPHRINE HYDROCHLORIDE 100 MCG: 10 INJECTION INTRAVENOUS at 12:32

## 2021-08-16 RX ADMIN — HEPARIN SODIUM 5000 UNITS: 5000 INJECTION INTRAVENOUS; SUBCUTANEOUS at 23:41

## 2021-08-16 RX ADMIN — HYDROMORPHONE HYDROCHLORIDE 0.5 MG: 1 INJECTION, SOLUTION INTRAMUSCULAR; INTRAVENOUS; SUBCUTANEOUS at 15:08

## 2021-08-16 RX ADMIN — Medication 10 MG: at 12:08

## 2021-08-16 RX ADMIN — GLYCOPYRROLATE 0.2 MG: 0.2 INJECTION INTRAMUSCULAR; INTRAVENOUS at 08:25

## 2021-08-16 RX ADMIN — KETAMINE HYDROCHLORIDE 20 MG: 10 INJECTION, SOLUTION INTRAMUSCULAR; INTRAVENOUS at 07:09

## 2021-08-16 RX ADMIN — ACETAMINOPHEN 1000 MG: 500 TABLET ORAL at 17:16

## 2021-08-16 RX ADMIN — SODIUM CHLORIDE: 900 INJECTION, SOLUTION INTRAVENOUS at 14:05

## 2021-08-16 RX ADMIN — KETAMINE HYDROCHLORIDE 10 MG: 10 INJECTION, SOLUTION INTRAMUSCULAR; INTRAVENOUS at 09:21

## 2021-08-16 RX ADMIN — GABAPENTIN 100 MG: 100 CAPSULE ORAL at 17:16

## 2021-08-16 RX ADMIN — HEPARIN SODIUM 5000 UNITS: 5000 INJECTION INTRAVENOUS; SUBCUTANEOUS at 06:26

## 2021-08-16 RX ADMIN — DEXTROSE MONOHYDRATE, SODIUM CHLORIDE, AND POTASSIUM CHLORIDE 100 ML/HR: 50; 4.5; 1.49 INJECTION, SOLUTION INTRAVENOUS at 17:25

## 2021-08-16 RX ADMIN — PHENYLEPHRINE HYDROCHLORIDE 100 MCG: 10 INJECTION INTRAVENOUS at 14:01

## 2021-08-16 RX ADMIN — HYDROMORPHONE HYDROCHLORIDE 0.5 MG: 2 INJECTION, SOLUTION INTRAMUSCULAR; INTRAVENOUS; SUBCUTANEOUS at 07:09

## 2021-08-16 RX ADMIN — ROCURONIUM BROMIDE 10 MG: 10 INJECTION, SOLUTION INTRAVENOUS at 10:26

## 2021-08-16 RX ADMIN — METRONIDAZOLE 500 MG: 500 INJECTION, SOLUTION INTRAVENOUS at 07:14

## 2021-08-16 RX ADMIN — Medication 10 ML: at 21:16

## 2021-08-16 RX ADMIN — HYDROMORPHONE HYDROCHLORIDE 0.5 MG: 2 INJECTION, SOLUTION INTRAMUSCULAR; INTRAVENOUS; SUBCUTANEOUS at 08:00

## 2021-08-16 RX ADMIN — MIDAZOLAM 2 MG: 1 INJECTION INTRAMUSCULAR; INTRAVENOUS at 07:07

## 2021-08-16 RX ADMIN — KETAMINE HYDROCHLORIDE 10 MG: 10 INJECTION, SOLUTION INTRAMUSCULAR; INTRAVENOUS at 08:14

## 2021-08-16 RX ADMIN — SODIUM CHLORIDE, SODIUM LACTATE, POTASSIUM CHLORIDE, AND CALCIUM CHLORIDE 125 ML/HR: 600; 310; 30; 20 INJECTION, SOLUTION INTRAVENOUS at 16:05

## 2021-08-16 RX ADMIN — SODIUM CHLORIDE: 900 INJECTION, SOLUTION INTRAVENOUS at 13:35

## 2021-08-16 RX ADMIN — SODIUM CHLORIDE 125 ML/HR: 900 INJECTION, SOLUTION INTRAVENOUS at 06:22

## 2021-08-16 RX ADMIN — CEFTRIAXONE SODIUM 2 G: 2 INJECTION, POWDER, FOR SOLUTION INTRAMUSCULAR; INTRAVENOUS at 19:45

## 2021-08-16 RX ADMIN — SODIUM CHLORIDE, SODIUM LACTATE, POTASSIUM CHLORIDE, AND CALCIUM CHLORIDE: 600; 310; 30; 20 INJECTION, SOLUTION INTRAVENOUS at 13:57

## 2021-08-16 RX ADMIN — KETOROLAC TROMETHAMINE 30 MG: 15 INJECTION, SOLUTION INTRAMUSCULAR; INTRAVENOUS at 14:39

## 2021-08-16 RX ADMIN — Medication 10 MG: at 08:24

## 2021-08-16 RX ADMIN — KETAMINE HYDROCHLORIDE 10 MG: 10 INJECTION, SOLUTION INTRAMUSCULAR; INTRAVENOUS at 10:28

## 2021-08-16 RX ADMIN — PHENYLEPHRINE HYDROCHLORIDE 100 MCG: 10 INJECTION INTRAVENOUS at 13:06

## 2021-08-16 RX ADMIN — GABAPENTIN 100 MG: 100 CAPSULE ORAL at 21:15

## 2021-08-16 RX ADMIN — FENTANYL CITRATE 25 MCG: 50 INJECTION, SOLUTION INTRAMUSCULAR; INTRAVENOUS at 16:07

## 2021-08-16 RX ADMIN — PROPOFOL 150 MG: 10 INJECTION, EMULSION INTRAVENOUS at 07:18

## 2021-08-16 RX ADMIN — HYDROMORPHONE HYDROCHLORIDE 0.5 MG: 1 INJECTION, SOLUTION INTRAMUSCULAR; INTRAVENOUS; SUBCUTANEOUS at 15:22

## 2021-08-16 RX ADMIN — ACETAMINOPHEN 1000 MG: 500 TABLET ORAL at 21:15

## 2021-08-16 RX ADMIN — ONDANSETRON HYDROCHLORIDE 4 MG: 2 INJECTION INTRAMUSCULAR; INTRAVENOUS at 10:29

## 2021-08-16 RX ADMIN — KETOROLAC TROMETHAMINE 30 MG: 30 INJECTION, SOLUTION INTRAMUSCULAR; INTRAVENOUS at 23:42

## 2021-08-16 RX ADMIN — METOCLOPRAMIDE HYDROCHLORIDE 5 MG: 5 INJECTION INTRAMUSCULAR; INTRAVENOUS at 23:42

## 2021-08-16 RX ADMIN — METOCLOPRAMIDE HYDROCHLORIDE 5 MG: 5 INJECTION INTRAMUSCULAR; INTRAVENOUS at 17:17

## 2021-08-16 RX ADMIN — ROCURONIUM BROMIDE 50 MG: 10 INJECTION, SOLUTION INTRAVENOUS at 07:20

## 2021-08-16 RX ADMIN — PHENYLEPHRINE HYDROCHLORIDE 100 MCG: 10 INJECTION INTRAVENOUS at 14:04

## 2021-08-16 RX ADMIN — SUGAMMADEX 200 MG: 100 INJECTION, SOLUTION INTRAVENOUS at 14:37

## 2021-08-16 RX ADMIN — LIDOCAINE HYDROCHLORIDE 100 MG: 20 INJECTION, SOLUTION INTRAVENOUS at 07:17

## 2021-08-16 RX ADMIN — ROCURONIUM BROMIDE 10 MG: 10 INJECTION, SOLUTION INTRAVENOUS at 13:10

## 2021-08-16 RX ADMIN — HEPARIN SODIUM 5000 UNITS: 5000 INJECTION INTRAVENOUS; SUBCUTANEOUS at 17:16

## 2021-08-16 RX ADMIN — ROCURONIUM BROMIDE 30 MG: 10 INJECTION, SOLUTION INTRAVENOUS at 08:31

## 2021-08-16 NOTE — PROGRESS NOTES
Assumed care of pt at this time. Assessment complete. Pt alert and oriented x 4. Showing no signs of distress. Denies SOB and chest pain. Pt lungs clear bilaterally. Cap refill  less than 3 seconds. Stated pain 2 /10. Pt has 18G IV to L hand. 18G IV to R hand Dressing to abdomen CDI . SCDS applied to bilaterally. Pt has a hamilton that is patent and draining pink urine. Pt hs a KEANU drain that is charged, patent and draining serosanguinous. Pt encouraged to continue use of IS. Pt instructed on telling nurse about gas. Pt verbalized understanding. Ice pack in place. Fall risk armband intact. Call light and possessions within reach. Bed in low position with wheels locked. Will continue to monitor. 1648-Pt instructed on needing to ambulate this evening. Pt wants to rest and eat clear liquids before ambulating. 1731-Pt complaining of L itchy eye. Nurse washed out eyes with NS. Pt eye is red. 1809-Pt ambulated in room with PT. Shift Summary-  Pt is alert and oriented x 4. Pt had uneventful shift. Pt ambulated one time. Pt has a KEANU drain and hamilton.  Pt has not had any PRN pain medication

## 2021-08-16 NOTE — ANESTHESIA PREPROCEDURE EVALUATION
Relevant Problems   No relevant active problems       Anesthetic History   No history of anesthetic complications            Review of Systems / Medical History  Patient summary reviewed, nursing notes reviewed and pertinent labs reviewed    Pulmonary  Within defined limits                 Neuro/Psych   Within defined limits           Cardiovascular    Hypertension          Hyperlipidemia         GI/Hepatic/Renal  Within defined limits              Endo/Other  Within defined limits           Other Findings              Physical Exam    Airway  Mallampati: IV  TM Distance: 4 - 6 cm  Neck ROM: normal range of motion   Mouth opening: Normal     Cardiovascular    Rhythm: regular  Rate: normal         Dental  No notable dental hx       Pulmonary  Breath sounds clear to auscultation               Abdominal  GI exam deferred       Other Findings            Anesthetic Plan    ASA: 2  Anesthesia type: general          Induction: Intravenous  Anesthetic plan and risks discussed with: Patient

## 2021-08-16 NOTE — PROGRESS NOTES
Patient transferred to room 203 via inpatient bed in stable condition, VSS.       08/16/21 1628   Modified Mt Score   Activity 2   Respiration 2   Circulation 2   Consciousness 2   O2 Saturation 1   Mt Score 9   Vital Signs   Temp 97.3 °F (36.3 °C)   Temp Source Oral   Pulse (Heart Rate) (!) 104   Resp Rate 16   /72   Oxygen Therapy   O2 Sat (%) 99 %

## 2021-08-16 NOTE — BRIEF OP NOTE
Brief Postoperative Note    Patient: Gregory Napoles  YOB: 1955  MRN: [id number]    Date of Procedure: 8/16/2021     Pre-Op Diagnosis: PERFORATED DIVERTICULITIS WITH ABSCESS    Post-Op Diagnosis: Same as preoperative diagnosis. Procedure(s):  ROBOTIC ASSIST LOW ANTERIOR RESECTION, MOBILIZATION OF SPLENIC FLEXURE, OMENTAL FLAP, ABSCESS DRAINAGE, CYSTOSCOPY WITH URETERAL STENT PLACEMENT (ERAS PROTOCOL)    Surgeon(s):  DO Bridget Salgado MD    Surgical Assistant: None    Anesthesia: General     Estimated Blood Loss (mL): less than 50     Complications: None    Specimens:   ID Type Source Tests Collected by Time Destination   1 : ABDOMINAL DEBRIS Preservative Abdomen  Saw [place] DO 8/16/2021 1049 Pathology   2 : SIGMOID Preservative Sigmoid  Saw [place] DO 8/16/2021 1306 Pathology   3 : PROXIMAL AND DISTAL DOUNUTS Preservative Colon  Saw [place] DO 8/16/2021 1353 Pathology        Implants: * No implants in log *    Drains:   Carloz-Robledo Drain 08/16/21 Right; Lower Abdomen (Active)   Site Assessment Clean, dry, & intact 08/16/21 1443   Dressing Status Clean, dry, & intact 08/16/21 1443   Status Patent; Charged;Draining 08/16/21 1443   Drainage Color Serosanguinous 08/16/21 1443       Findings: large abscess walled off, large food articles - corn, leafy greens with stems (copius) with abbie pus, perforated diverticulitis    Electronically Signed by Felice Herrera DO on 8/16/2021 at 3:39 PM

## 2021-08-16 NOTE — ROUTINE PROCESS
Bedside and Verbal shift change report given to Debra Haque (oncoming nurse) by Travis Rutherford RN (offgoing nurse). Report included the following information SBAR, Kardex, MAR and Recent Results.

## 2021-08-16 NOTE — PERIOP NOTES
TRANSFER - OUT REPORT:    Verbal report given to Berkley Huizar on Tana Esquivel  being transferred to [address] [place] [address] for routine post - op       Report consisted of patients Situation, Background, Assessment and   Recommendations(SBAR). Information from the following report(s) SBAR, OR Summary, Procedure Summary, Intake/Output and MAR was reviewed with the receiving nurse. Lines:   Peripheral IV 08/16/21 Left;Posterior Hand (Active)   Site Assessment Clean, dry, & intact 08/16/21 1447   Phlebitis Assessment 0 08/16/21 1447   Infiltration Assessment 0 08/16/21 1447   Dressing Status Clean, dry, & intact 08/16/21 1447   Dressing Type Transparent;Tape 08/16/21 1447   Hub Color/Line Status Green; Infusing;Patent 08/16/21 1447   Alcohol Cap Used No 08/16/21 0624       Peripheral IV 08/16/21 Posterior;Right Hand (Active)   Site Assessment Clean, dry, & intact 08/16/21 1447   Phlebitis Assessment 0 08/16/21 1447   Infiltration Assessment 0 08/16/21 1447   Dressing Status Clean, dry, & intact 08/16/21 1447   Dressing Type Transparent;Tape 08/16/21 1447   Hub Color/Line Status Green; Infusing 08/16/21 1447   Alcohol Cap Used No 08/16/21 0625      Date 08/15/21 0700 - 08/16/21 0659(Not Admitted) 08/16/21 0700 - 08/17/21 0659   Shift 9048-4250 8876-5033 24 Hour Total 9962-6297 8293-0552 24 Hour Total   INTAKE   I.V.    1720  1720     Volume (lactated Ringers infusion)    800  800     Volume (0.9% sodium chloride infusion)    300  300     Volume (0.9% sodium chloride infusion)    600  600     Volume (cefTRIAXone (ROCEPHIN) 2 g in sterile water (preservative free) 20 mL IV syringe)    20  20   Shift Total(mL/kg)    1720(17.6)  1720(17.6)   OUTPUT   Blood    50  50     Estimated Blood Loss    50  50   Shift Total(mL/kg)    50(0.5)  50(0.5)   NET    1670  1670   Weight (kg)  98 98 98 98 98       Opportunity for questions and clarification was provided.       Patient transported with:   O2 @ 2 liters  Registered Nurse  [facility]

## 2021-08-16 NOTE — PROGRESS NOTES
Problem: Self Care Deficits Care Plan (Adult)  Goal: *Acute Goals and Plan of Care (Insert Text)  Description: Occupational Therapy Goals  Initiated 8/16/2021 within 7 day(s). 1.  Patient will perform lower body dressing with supervision/set-up   2. Patient will perform toilet transfers with supervision/set-up. 3.  Patient will perform all aspects of toileting with supervision/set-up. 4.  Patient will complete standing for 5 minutes with supervision to increase activity tolerance for functional activities. 5.  Patient will utilize energy conservation techniques during functional activities with min verbal cues. Outcome: Progressing Towards Goal     Occupational Therapy EVALUATION    Patient: Stanford Camacho (89 y.o. male)  Date: 8/16/2021  Primary Diagnosis: Diverticulitis of large intestine with abscess [K57.20]  Procedure(s) (LRB):  ROBOTIC ASSIST LOW ANTERIOR RESECTION, MOBILIZATION OF SPLENIC FLEXURE, OMENTAL FLAP, ABSCESS DRAINAGE, CYSTOSCOPY WITH URETERAL STENT PLACEMENT (ERAS PROTOCOL) (N/A) Day of Surgery   Precautions: fall       ASSESSMENT :  Based on the objective data described below, the patient presents with decreased I with ADLs, transfers, mobility, and strength for functional activities. Pt completed LB ADL with mod assist and education on cross leg technique and ADLs techniques for LB ADL. Pt completed supine to sit EOB with CGA. Sit to stand at EOB using RW and min assist. Pt completed ambulation about 20 feet using RW with min assist and min cues for direction. Pt reported some dizziness throughout session but BP remained steady. Pt could benefit from OT to increase I with ADLs, transfers, mobility, activity tolerance and strength for functional activities. Patient will benefit from skilled intervention to address the above impairments.   Patients rehabilitation potential is considered to be Good  Factors which may influence rehabilitation potential include:   [] None noted  []             Mental ability/status  []             Medical condition  []             Home/family situation and support systems  []             Safety awareness  []             Pain tolerance/management  []             Other:      PLAN :  Recommendations and Planned Interventions:  [x]               Self Care Training                  [x]        Therapeutic Activities  [x]               Functional Mobility Training    []        Cognitive Retraining  [x]               Therapeutic Exercises           [x]        Endurance Activities  [x]               Balance Training                   []        Neuromuscular Re-Education  []               Visual/Perceptual Training     [x]   Home Safety Training  [x]               Patient Education                 [x]        Family Training/Education  []               Other (comment):    Frequency/Duration: Patient will be followed by occupational therapy 1-2 times per day/4-7 days per week to address goals. Discharge Recommendations: Home Health  Further Equipment Recommendations for Discharge: N/A     SUBJECTIVE:   Patient stated I'm a little dizzy.     OBJECTIVE DATA SUMMARY:     Past Medical History:   Diagnosis Date    High cholesterol      Past Surgical History:   Procedure Laterality Date    HX APPENDECTOMY  1968    IR INJ ABS CYST VIA CATHETER / TUBE  7/26/2021     Barriers to Learning/Limitations: None  Compensate with: visual, verbal, tactile, kinesthetic cues/model  Prior Level of Function/Home Situation: I with ADLs prior to admission  Home Situation  Home Environment: Private residence  # Steps to Enter: 3  Rails to Enter: Yes  Hand Rails : Right  One/Two Story Residence: Two story  # of Interior Steps: 11  Interior Rails: Right  Living Alone: No  Support Systems: Spouse/Significant Other/Partner  Patient Expects to be Discharged to Cor[de-identified]ration  Current DME Used/Available at Home: None  Tub or Shower Type: Shower  []  Right hand dominant   []  Left hand dominant  Cognitive/Behavioral Status:  Neurologic State: Alert; Appropriate for age    Skin: incision on abdomen covered with dressing  Edema: none noted  Vision/Perceptual:    N/A  Coordination:  Coordination: (P) Within functional limits  Fine Motor Skills-Upper: Left Intact; Right Intact    Gross Motor Skills-Upper: Left Intact; Right Intact  Balance:  Sitting: (P) Intact  Standing: (P) Intact; With support  Standing - Static: (P) Good  Standing - Dynamic : (P) Fair  Strength:  Strength: (P) Generally decreased, functional  Tone & Sensation:  Tone: (P) Normal  Sensation: (P) Intact  Range of Motion:  AROM: (P) Generally decreased, functional  Functional Mobility and Transfers for ADLs:  Bed Mobility:  Supine to Sit: (P) Contact guard assistance (vc)  Sit to Supine: (P) Contact guard assistance (vc)  Scooting: (P) Stand-by assistance  Transfers:  Sit to Stand: (P) Minimum assistance  ADL Assessment:  Upper Body Dressing: Contact guard assistance  Lower Body Dressing: Moderate assistance      Pain:  Pain Scale 1: FLACC  Pain Intensity 1: 0  Pain Location 1: Abdomen; Incisional  Pain Orientation 1: Mid  Pain Description 1: Aching  Pain Intervention(s) 1: Medication (see MAR); Ice  Activity Tolerance:   Fair +  Please refer to the flowsheet for vital signs taken during this treatment. After treatment:   [] Patient left in no apparent distress sitting up in chair  [x] Patient left in no apparent distress in bed  [x] Call bell left within reach  [x] Nursing notified  [x] Caregiver present  [] Bed alarm activated    COMMUNICATION/EDUCATION:   [x] Home safety education was provided and the patient/caregiver indicated understanding. [x] Patient/family have participated as able in goal setting and plan of care. [x] Patient/family agree to work toward stated goals and plan of care. [] Patient understands intent and goals of therapy, but is neutral about his/her participation.   [] Patient is unable to participate in goal setting and plan of care.     Thank you for this referral.  Junior Crowley OTR/L  Time Calculation: 29 mins

## 2021-08-16 NOTE — PROGRESS NOTES
19:45 Assessment completed. O2 remains@ 2 LPM per NC. Lung are clear bilat. IS = 2000. Abd dsg remains C/D/I with serosanguinous drainage per KEANU. BS are +. Pace is patent with brown colored urine with sediment present. Resting quietly in bed with wife @ bedside visiting. 22:55 Shift assessment completed. See nsg flow sheet for details. 02:55 Reassessed with 0 changes noted. Abd dsg remains C/D/I. Brii Bonnie is patent with serosanguinous draianage. Pace is patent with bloody urine. Resting quietly in bed with eyes closed between cares. 06:00 Katelynn d'cd per orders then ambulated to door & back to bed r/t lightheadedness. Denies passing gas yet. 07:00 Bedside and Verbal shift change report given to Mehrdad Plascencia RN (oncoming nurse) by Mehrdad Plascencia RN (offgoing nurse). Report included the following information SBAR.

## 2021-08-16 NOTE — H&P
Consult Note     Patient: Shahzad Robledo MRN: [id number]  CSN: [id number]    YOB: 1955  Age: 61 y.o. Sex: male    DOA: 7/15/2021 LOS:  LOS: 1 day          Requesting Physician: Dr. Phan Torres  Reason for Consultation: perforated diverticulitis                HPI:   Benja Asher is a 61 y.o. male who has been seen for perforated diverticulitis with large abscess. Pt has been having months of abdominal pain however associated pain with his hernia. At surgery Dr. Chaenl Funes felt and intra-abdominal mass and aborted. He obtained a CT scan which showed diverticulitis with abscess. The pt was sent to [facility] for admission, treatment and abscess drainage. He denies all other symptoms. His last colonoscopy was 3 yrs ago.          Past Medical History:   Diagnosis Date    High cholesterol           History reviewed. No pertinent surgical history.     History reviewed. No pertinent family history.     Social History               Socioeconomic History    Marital status:        Spouse name: Not on file    Number of children: Not on file    Years of education: Not on file    Highest education level: Not on file   Tobacco Use    Smoking status: Never Smoker    Smokeless tobacco: Never Used   Substance and Sexual Activity    Alcohol use: Yes    Drug use: Not Currently      Social Determinants of Health          Financial Resource Strain:     Difficulty of Paying Living Expenses:    Food Insecurity:     Worried About Running Out of Food in the Last Year:     [address] in the Last Year:    Transportation Needs:     Lack of Transportation (Medical):      Lack of Transportation (Non-Medical):    Physical Activity:     Days of Exercise per Week:     Minutes of Exercise per Session:    Stress:     Feeling of Stress :    Social Connections:     Frequency of Communication with Friends and Family:     Frequency of Social Gatherings with Friends and Family:     Attends [employer] Services:  Active Member of Clubs or Organizations:     Attends Club or Organization Meetings:     Marital Status:                     Prior to Admission medications    Medication Sig Start Date End Date Taking? Authorizing Provider   atorvastatin (LIPITOR) 10 mg tablet Take 10 mg by mouth daily. Indications: excessive fat in the blood     Yes Provider, Historical         No Known Allergies     Review of Systems  Pertinent items are noted in the History of Present Illness.        Physical Exam:      Visit Vitals  BP (!) 145/73   Pulse 77   Temp 97.9 °F (36.6 °C)   Resp 21   Ht 5' 11\" (1.803 m)   Wt 108 kg (238 lb)   SpO2 91%   BMI 33.19 kg/m²            Physical Exam:    GENERAL: alert, cooperative, no distress, appears stated age, LUNG: clear to auscultation bilaterally, HEART: regular rate and rhythm, ABDOMEN: soft TTP in low mid abd with firm mass. Drain in place with reddish brown malodorous liquid, EXTREMITIES:  extremities normal, atraumatic, no cyanosis or edema, SKIN: no rash or abnormalities, NEUROLOGIC: AOx3. Gait normal. Reflexes and motor strength normal and symmetric.  Cranial nerves 2-12 and sensation grossly intact., PSYCH: non focal     Labs Reviewed:        Labs: Results:         Chemistry      Recent Labs     07/16/21  0340 07/15/21  1554   GLU 98 81    143   K 4.2 3.2*    109   CO2 25 26   BUN 7 11   CREA 0.64 0.55*   CA 8.6 7.6*   AGAP 9 8   BUCR 11* 20   AP 51 54   TP 6.3* 5.5*   ALB 2.8* 2.5*   GLOB 3.5 3.0   AGRAT 0.8 0.8       CBC w/Diff      Recent Labs     07/16/21  0340 07/15/21  1440   WBC 8.1 10.3   RBC 4.12* 4.41   HGB 11.1* 11.8*   HCT 34.0* 36.3    335   GRANS 58 66   LYMPH 25 17*   EOS 3 2       Cardiac Enzymes No results for input(s): CPK, CKND1, JAMI in the last 72 hours.     No lab exists for component: CKRMB, TROIP   Coagulation     Recent Labs     07/15/21  1440   PTP 13.3   INR 1.0   APTT 32.0       Lipid Panel No results found for: CHOL, CHOLPOCT, CHOLX, CHLST, CHOLV, [id number], HDL, HDLP, LDL, LDLC, DLDLP, 527932, VLDLC, VLDL, TGLX, TRIGL, TRIGP, TGLPOCT, CHHD, CHHDX   BNP No results for input(s): BNPP in the last 72 hours. Liver Enzymes     Recent Labs     07/16/21  0340   TP 6.3*   ALB 2.8*   AP 51       Thyroid Studies No results found for: T4, T3U, TSH, TSHEXT           Imaging:  images and reports reviewed and reviewed  CT, Consultants documentation, Nursing documentation and I & O        Assessment/Plan           Patient Active Problem List   Diagnosis Code    Abscess of sigmoid colon due to diverticulitis K57.20         Discussed diagnosis at length with pt and wife. 30mins face to face. Pt will continue with drainage, antibiotics, IV narcotics. He will progress in his diet and after improvement will DC home with the drain with follow up with me. After \"cool-off\" period, pt will undergo controlled resection.   All questions answered.

## 2021-08-16 NOTE — PERIOP NOTES
Reviewed PTA medication list with patient/caregiver and patient/caregiver denies any additional medications. Patient admits to having a responsible adult care for them at home for at least 24 hours after surgery. Patient encouraged to use gown warming system and informed that using said warming gown to regulate body temperature prior to a procedure has been shown to help reduce the risks of blood clots and infection. Patient's pharmacy of choice verified and documented in PTA medication section. Dual skin assessment & fall risk band verification completed with KAREEN Jain RN.

## 2021-08-16 NOTE — OP NOTES
OPERATIVE NOTE    Patient: Marcelina Ames MRN: [id number]  SSN: [id number]    YOB: 1955  Age: 61 y.o. Sex: male      Indications: This is a 61y.o. year-old male who presents with perforated diverticulitis. 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 The patient was admitted for surgery as conservative measures have failed. Date of Procedure: 8/16/2021     Preoperative Diagnosis: PERFORATED DIVERTICULITIS WITH ABSCESS    Postoperative Diagnosis: PERFORATED DIVERTICULITIS WITH ABSCESS      Procedure: Procedure(s):  ROBOTIC ASSIST LOW ANTERIOR RESECTION, MOBILIZATION OF SPLENIC FLEXURE, OMENTAL FLAP, ABSCESS DRAINAGE, CYSTOSCOPY WITH URETERAL STENT PLACEMENT (ERAS PROTOCOL)    Surgeon(s): Surgeon(s) and Role:     Franchesca Herndon DO - Primary     * Elizabeth Venegas MD - Consulting Physician    Assistant(s): Circ-1: Kofi Hampton RN  Circ-2: Radha Robertson RN  Circ-Relief: Elaina Gooden  Scrub Tech-1: Lobito TORRES  Surg Asst-Relief: Alfonso Dubois    Anesthesia: General     Procedure: The patient was consented and all questions answered. Consent was verified. The patient was brought to the OR and placed in the supine position. Pre-operative antibiotics were dosed prior to the incision. Heparin was given in the preoperative holding area for DVT prophylaxis. The patient was secured with a lower body strap and all pressure points were padded. A fabiana hugger warming unit was placed across the upper body and SCDs were placed to bilateral lower extremities. General anesthesia was then induced without complication. The patients abdomen was prepped and draped in the standard sterile fashion.  A surgical timeout was completed.  A 30 degree cystoscope was placed under direct visualization through the urethra into the bladder.  The right and left ureteral orifices were identified. Jovita Zendejas was no other abnormalities noted.  Under direct visualization stent was placed over a wire into the right ureteral orifice to approximately 20 cm.  This was repeated in the left ureteral orifice without difficulty.  The cystoscope was then removed and lighted inserts were placed into the stents.  A Pace was then placed into the bladder and after confirmation in the bladder the balloon was insufflated.  The stents were then secured to the Pace catheter.     The abdomen was insufflated and a camera was placed into the abdomen. Upon visual inspection there was no concerning findings. Under direct visualization an exparel TAP block was performed. The peritoneal cavity was visualized with no injury was noted from entry.  Then 4 trocars were placed from the LUQ to the right lower quadrant as well as an assistant port in the right side, and the Xi robot was docked in the standard fashion. Instruments were then placed and the abdomen the sigmoid was grasped and lifted. It was retracted medially and the white line of toldt was incised following its path superiorly. Mobilization of the splenic flexure was required to allow for length of the left colon for anastomosis. This was performed using a combination of electrocautery and ligasure energy device. Thereafter, mobilization of the sigmoid was performed. The sigmoid was cemented to the LLQ and anterior abdominal wall as well as small bowel loop. This was taken down with great care and took a significant amount of time due to the very inflammatory nature. The fistula was found to the anterior abdominal wall. It was large and when dissected away upon separation a large abscess cavity was encountered and a significant amount of pus extruded. There was also a significant amount of food debris outside of the lumen of the colon in the fistula and abscess cavity - several corn and leafy type with stems were noted, the rest of the food and stool debris was unidentifiable.  The abscess was drained and widely opened. The anterior abdominal wall below the umbilicus had severe inflammation and thickened rind from chronic infection. This was done with great difficulty due to inflammatory cementing of structures with very redundant sigmoid making anatomy almost unidentifiable. Meticulous careful dissection was successful without injury. The proximal transection point was then identified an area of soft normal appearing left colon. The left colon was then stapled distally using two 45 green loads. A distal transection point was identified on the proximal rectum. The rectum was stapled using 2x 45 green load. The adjacent mesentery was transected and special attention was taken to identify and avoid the left ureter before transection of the vessels with the vessel sealer. A low midline incision was made and the abdomen entered without difficulty. A hand port was placed. Sizers were used in the rectum to determine EEA size. The handport accessory was removed and the specimen brought out through the wound through the wound protector. The specimen was then handed off the table. The staple line was squared off with blue towels. A 2-0 Prolene was utilized to create a purse-string and an anvil was placed into the proximal colon. The abdomen was then re-insufflated. Under direct visualization the 31 EEA stapler was placed through the anus into the rectal stump. Via palpation and visualization the spike was passed through the rectal wall just anterior to the staple line. An EEA stapled anastomosis was then performed without difficulty. Two good donuts obtained, intact. Proctoscopy was performed and using proctoscopy under direct visualization, a leak test was performed with insufflation of the rectum under saline. There was no evidence of leak of the anastomosis. The area was irrigated profusely and irrigant removed via suction and hemostasis was assured and well as removal of any debris.  The length of the left colon was once again identified to ensure no torsion of the colon or small bowel trapped posteriorly behind the colon. The omental flap needed to be mobilized. The tongue of the omentum was taken off the transverse colon and mobilized in standard right to left fashion using the energy device. This healthy piece of well-vascularized omental flap was placed deep in the pelvis overlying the rectum to avoid fistula formation at the level of the abscess and staple line. A drain was placed in the pelvis in the abscess cavity which had been opened exiting through the RLQ trocar site. The trocars and handport were removed under direct visualization  Gloves and instruments were changed. The wounds were irrigated and the hand port was closed with a #1 PDS suture external fascia layer. The dermis of the wound was kept open as this was a class 4 wound and packing was placed in the wound for drainage. The skin of the trocar incisions was closed using a monocryl subcuticular stitch. All incisions were then dressed with dermabond. The stents were then removed. The patient was awakened from anesthesia and extubated without complication and transported to the PACU for further observation. Sponge/ Needle/ Instrument count reported as correct. Findings:  large abscess walled off, large food articles - corn, leafy greens with stems (copius) with abbie pus, perforated diverticulitis with intraabdominal infection.     Estimated Blood Loss: Minimal    Specimens:   ID Type Source Tests Collected by Time Destination   1 : ABDOMINAL DEBRIS Preservative Abdomen  Yvan Burnham,  8/16/2021 1049 Pathology   2 : SIGMOID Preservative Sigmoid  Yvan Burnham,  8/16/2021 1306 Pathology   3 : PROXIMAL AND DISTAL DOUNUTS Preservative Colon  Yvan Burnham,  8/16/2021 1353 Pathology        Drains: (19fr)Carloz-Robledo drain(s) with closed bulb suction in the abscess cavity to drain in pelvis    Implants: * No implants in log *    Complications: None; patient tolerated the procedure well.     Tony Boyd DO  8/16/2021  3:41 PM

## 2021-08-16 NOTE — PROGRESS NOTES
TRANSFER - IN REPORT:    Verbal report received from [facility]) on [employer]  being received from [employer]) for routine post - op      Report consisted of patients Situation, Background, Assessment and   Recommendations(SBAR). Information from the following report(s) SBAR, Kardex, [facility] and Recent Results was reviewed with the receiving nurse. Opportunity for questions and clarification was provided. Assessment completed upon patients arrival to unit and care assumed.

## 2021-08-16 NOTE — PERIOP NOTES
Yes No N/A Patient confirms that: Comments:    []   [x]     []    Showered with CHG for 3 days prior to surgery Did CHG wipes x 1 day due to drainage tube in place    [x]   []   [] Changed bed linen daily x 3 days prior to surgery     [x]   []     []    Used fresh towel/pajamas daily x 3 days prior to surgery     [x]   []   [] Clear liquids only day prior to surgery     [x]   []     []    Completed Ensure Pre Surgery Drink x 2 PM of surgery     [x]   []   [] Completed Bowel Prep night prior to surgery     [x]   []     []    Completed Flagyl (1000mg at 1p, 2, & 11p)     [x]   []   [] Completed Neomycin (1000mg at 1p, 2p, 11,)     [x]   []   [] Completed Gabappentin (300mg 8a, 4p, MN)       On day of surgery, patient verified that:     [x]   []   [] Drank 1 bottle of Ensure Pre Surgery Drink     [x]   []     []    Brushed/flossed teeth

## 2021-08-16 NOTE — PROGRESS NOTES
Problem: Mobility Impaired (Adult and Pediatric)  Goal: *Acute Goals and Plan of Care (Insert Text)  Description: Physical Therapy short term goals initiated 08/16/21, to be achieved in 3-7 days. Pt will:   1. Perform bed mobility with S in prep for OOB activity. 2. Perform sit <> stand transfers with LRAD and S in prep for functional mobility and ambulation. 3. Ambulate 200 ft with LRAD and S in prep for household and community mobility. 4. Ascend/descend >2 stairs with 1 HR and S for safe home entry. Note:     PHYSICAL THERAPY EVALUATION    Patient: Lawyer Paredes (46 y.o. male)  Date: 8/16/2021  Primary Diagnosis: Diverticulitis of large intestine with abscess [K57.20]  Procedure(s) (LRB):  ROBOTIC ASSIST LOW ANTERIOR RESECTION, MOBILIZATION OF SPLENIC FLEXURE, OMENTAL FLAP, ABSCESS DRAINAGE, CYSTOSCOPY WITH URETERAL STENT PLACEMENT (ERAS PROTOCOL) (N/A) Day of Surgery   Precautions: Fall     PLOF: Pt was independent with household and community mobility with no AD PTA. ASSESSMENT :  Based on the objective data described below, the patient presents with decr B LE strength, decr activity tolerance, decr balance, and abdominal pain resulting in deficits in bed mobility, transfers, and gait. Pt supine in bed on PT entry, seen with OT for additional set of skilled hands and to incr pt safety with mobility. Pt rated abdominal pain 7/10. Pt able to transition to sitting EOB with SBA. Pt performed STS transfers with RW and CGA, vc for safe hand placement. Pt with decr tolerance to OOB mobility, reporting abdominal pain and dizziness with activity. Pt with denied incr in dizziness throughout session, able to amb in room with RW and CGA. Pt then returned to bed, educated on importance of early mobility, need to call for assistance with OOB mobility and PT plan of care. Pt left supine in bed with all needs met and all questions answered.      Patient will benefit from skilled intervention to address the above impairments. Patient's rehabilitation potential is considered to be Fair  Factors which may influence rehabilitation potential include:   []         None noted  []         Mental ability/status  [x]         Medical condition  []         Home/family situation and support systems  []         Safety awareness  []         Pain tolerance/management  []         Other:      PLAN :  Recommendations and Planned Interventions:   [x]           Bed Mobility Training             []    Neuromuscular Re-Education  [x]           Transfer Training                   []    Orthotic/Prosthetic Training  [x]           Gait Training                          [x]    Modalities  [x]           Therapeutic Exercises           []    Edema Management/Control  [x]           Therapeutic Activities            [x]    Family Training/Education  [x]           Patient Education  []           Other (comment):    Frequency/Duration: Patient will be followed by physical therapy 1-2 times per day/4-7 days per week to address goals. Discharge Recommendations: Home Health  Further Equipment Recommendations for Discharge: N/A     SUBJECTIVE:   Patient stated I probably should have used a walker at home, but I didn't.     OBJECTIVE DATA SUMMARY:     Past Medical History:   Diagnosis Date    High cholesterol      Past Surgical History:   Procedure Laterality Date    HX APPENDECTOMY  1968    IR INJ ABS CYST VIA CATHETER / TUBE  7/26/2021     Barriers to Learning/Limitations: yes;  other post-anesthesia  Compensate with: Visual Cues and Verbal Cues  Home Situation:  Home Situation  Home Environment: Private residence  # Steps to Enter: 3  Rails to Enter: Yes  Hand Rails : Right  One/Two Story Residence: Two story  # of Interior Steps: 11  Interior Rails: Right  Living Alone: No  Support Systems: Spouse/Significant Other/Partner  Patient Expects to be Discharged toVF Cor[de-identified]ration  Current DME Used/Available at Home: None  Tub or Shower Type: Shower  Critical Behavior:  Neurologic State: Alert; Appropriate for age  Psychosocial  Patient Behaviors: Appropriate for age;Calm  Purposeful Interaction: Yes  Pt Identified Daily Priority: Clinical issues (comment)  Caritas Process: Nurture loving kindness;Establish trust;Teaching/learning; Attend basic human needs;Create healing environment  Caring Interventions: Reassure; Therapeutic modalities  Reassure: Therapeutic listening; Informing  Therapeutic Modalities: Intentional therapeutic touch  Skin Condition/Temp: Warm;Dry  Skin Integrity: Incision (comment) (x4 lap sites, midline incision)  Skin Integumentary  Skin Color: Appropriate for ethnicity  Skin Condition/Temp: Warm;Dry  Skin Integrity: Incision (comment) (x4 lap sites, midline incision)  Strength:    Strength: Generally decreased, functional  Tone & Sensation:   Tone: Normal  Sensation: Intact  Range Of Motion:  AROM: Generally decreased, functional  Functional Mobility:  Bed Mobility:  Supine to Sit: Contact guard assistance (vc)  Sit to Supine: Contact guard assistance (vc)  Scooting: Stand-by assistance  Transfers:  Sit to Stand: Minimum assistance  Stand to Sit: Minimum assistance  Balance:   Sitting: Intact  Standing: Intact; With support  Standing - Static: Good  Standing - Dynamic : Fair  Ambulation/Gait Training:  Distance (ft): 20 Feet (ft)  Assistive Device: Gait belt;Walker, rolling  Ambulation - Level of Assistance: Contact guard assistance  Gait Abnormalities: Decreased step clearance  Speed/Jaleesa: Slow  Step Length: Left shortened;Right shortened  Interventions: Safety awareness training; Tactile cues; Verbal cues  Pain:  Pain level pre-treatment: 7/10   Pain level post-treatment: 7/10   Pain Intervention(s) : Medication (see MAR); Rest, Ice, Repositioning  Response to intervention: Nurse notified, See doc flow  Activity Tolerance:   Pt with abdominal pain rated 7/10 and reporting dizziness, but able to amb short distances with RW.    Please refer to the flowsheet for vital signs taken during this treatment. After treatment:   []         Patient left in no apparent distress sitting up in chair  [x]         Patient left in no apparent distress in bed  [x]         Call bell left within reach  [x]         Nursing notified  []         Caregiver present  []         Bed alarm activated  []         SCDs applied    COMMUNICATION/EDUCATION:   [x]         Role of Physical Therapy in the acute care setting. [x]         Fall prevention education was provided and the patient/caregiver indicated understanding. [x]         Patient/family have participated as able in goal setting and plan of care. [x]         Patient/family agree to work toward stated goals and plan of care. []         Patient understands intent and goals of therapy, but is neutral about his/her participation. []         Patient is unable to participate in goal setting/plan of care: ongoing with therapy staff.  []         Other:     Thank you for this referral.  Oneyda Age   Time Calculation: 27 mins      Eval Complexity: History: MEDIUM  Complexity : 1-2 comorbidities / personal factors will impact the outcome/ POC Exam:MEDIUM Complexity : 3 Standardized tests and measures addressing body structure, function, activity limitation and / or participation in recreation  Presentation: MEDIUM Complexity : Evolving with changing characteristics  Clinical Decision Making:Medium Complexity    Overall Complexity:MEDIUM

## 2021-08-16 NOTE — ANESTHESIA POSTPROCEDURE EVALUATION
Procedure(s):  ROBOTIC ASSIST LOW ANTERIOR RESECTION, MOBILIZATION OF SPLENIC FLEXURE, OMENTAL FLAP, ABSCESS DRAINAGE, CYSTOSCOPY WITH URETERAL STENT PLACEMENT (ERAS PROTOCOL).     general    Anesthesia Post Evaluation      Multimodal analgesia: multimodal analgesia used between 6 hours prior to anesthesia start to PACU discharge  Patient location during evaluation: PACU  Patient participation: complete - patient participated  Level of consciousness: awake and alert  Pain management: satisfactory to patient  Airway patency: patent  Anesthetic complications: no  Cardiovascular status: acceptable, hemodynamically stable and blood pressure returned to baseline  Respiratory status: acceptable and nasal cannula  Hydration status: acceptable  Post anesthesia nausea and vomiting:  none  Final Post Anesthesia Temperature Assessment:  Normothermia (36.0-37.5 degrees C)      INITIAL Post-op Vital signs:   Vitals Value Taken Time   /72 08/16/21 1628   Temp 36.3 °C (97.3 °F) 08/16/21 1628   Pulse 104 08/16/21 1628   Resp 16 08/16/21 1628   SpO2 99 % 08/16/21 1628

## 2021-08-16 NOTE — H&P
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  Drainage then stopped and repeat CT showed abscess cavity larger than prior. Needs urgent resection as stool is likely too thick for drainage. Otherwise stable and without complaint. Non-toxic, no peritoneal signs. Pt has much higher risk for ostomy due to findings.

## 2021-08-16 NOTE — ADDENDUM NOTE
Addendum  created 08/16/21 1833 by Linda Santos CRNA    Flowsheet accepted, Intraprocedure Flowsheets edited

## 2021-08-17 LAB
ANION GAP SERPL CALC-SCNC: 6 MMOL/L (ref 3–18)
BUN SERPL-MCNC: 11 MG/DL (ref 7–18)
BUN/CREAT SERPL: 11 (ref 12–20)
CALCIUM SERPL-MCNC: 7.7 MG/DL (ref 8.5–10.1)
CHLORIDE SERPL-SCNC: 110 MMOL/L (ref 100–111)
CO2 SERPL-SCNC: 21 MMOL/L (ref 21–32)
CREAT SERPL-MCNC: 0.96 MG/DL (ref 0.6–1.3)
ERYTHROCYTE [DISTWIDTH] IN BLOOD BY AUTOMATED COUNT: 16.1 % (ref 11.6–14.5)
GLUCOSE SERPL-MCNC: 198 MG/DL (ref 74–99)
HCT VFR BLD AUTO: 32.2 % (ref 36–48)
HGB BLD-MCNC: 10.4 G/DL (ref 13–16)
MAGNESIUM SERPL-MCNC: 1.9 MG/DL (ref 1.6–2.6)
MCH RBC QN AUTO: 27 PG (ref 24–34)
MCHC RBC AUTO-ENTMCNC: 32.3 G/DL (ref 31–37)
MCV RBC AUTO: 83.6 FL (ref 74–97)
PHOSPHATE SERPL-MCNC: 2.3 MG/DL (ref 2.5–4.9)
PLATELET # BLD AUTO: 256 K/UL (ref 135–420)
PMV BLD AUTO: 9.9 FL (ref 9.2–11.8)
POTASSIUM SERPL-SCNC: 4.6 MMOL/L (ref 3.5–5.5)
RBC # BLD AUTO: 3.85 M/UL (ref 4.35–5.65)
SODIUM SERPL-SCNC: 137 MMOL/L (ref 136–145)
WBC # BLD AUTO: 9.6 K/UL (ref 4.6–13.2)

## 2021-08-17 PROCEDURE — 74011000250 HC RX REV CODE- 250: Performed by: SURGERY

## 2021-08-17 PROCEDURE — 97535 SELF CARE MNGMENT TRAINING: CPT

## 2021-08-17 PROCEDURE — 80048 BASIC METABOLIC PNL TOTAL CA: CPT

## 2021-08-17 PROCEDURE — 85027 COMPLETE CBC AUTOMATED: CPT

## 2021-08-17 PROCEDURE — 36415 COLL VENOUS BLD VENIPUNCTURE: CPT

## 2021-08-17 PROCEDURE — 83735 ASSAY OF MAGNESIUM: CPT

## 2021-08-17 PROCEDURE — 74011250636 HC RX REV CODE- 250/636: Performed by: SURGERY

## 2021-08-17 PROCEDURE — 65270000029 HC RM PRIVATE

## 2021-08-17 PROCEDURE — 84100 ASSAY OF PHOSPHORUS: CPT

## 2021-08-17 PROCEDURE — 97116 GAIT TRAINING THERAPY: CPT

## 2021-08-17 PROCEDURE — 74011250637 HC RX REV CODE- 250/637: Performed by: SURGERY

## 2021-08-17 RX ORDER — ATORVASTATIN CALCIUM 10 MG/1
10 TABLET, FILM COATED ORAL DAILY
Status: DISCONTINUED | OUTPATIENT
Start: 2021-08-18 | End: 2021-08-27 | Stop reason: HOSPADM

## 2021-08-17 RX ORDER — AMLODIPINE BESYLATE 5 MG/1
5 TABLET ORAL DAILY
Status: DISCONTINUED | OUTPATIENT
Start: 2021-08-18 | End: 2021-08-27 | Stop reason: HOSPADM

## 2021-08-17 RX ADMIN — Medication 10 ML: at 23:28

## 2021-08-17 RX ADMIN — MORPHINE SULFATE 2 MG: 4 INJECTION INTRAVENOUS at 03:10

## 2021-08-17 RX ADMIN — METOCLOPRAMIDE HYDROCHLORIDE 5 MG: 5 INJECTION INTRAMUSCULAR; INTRAVENOUS at 04:51

## 2021-08-17 RX ADMIN — METOCLOPRAMIDE HYDROCHLORIDE 5 MG: 5 INJECTION INTRAMUSCULAR; INTRAVENOUS at 11:12

## 2021-08-17 RX ADMIN — KETOROLAC TROMETHAMINE 30 MG: 30 INJECTION, SOLUTION INTRAMUSCULAR; INTRAVENOUS at 23:24

## 2021-08-17 RX ADMIN — Medication 10 ML: at 04:52

## 2021-08-17 RX ADMIN — KETOROLAC TROMETHAMINE 30 MG: 30 INJECTION, SOLUTION INTRAMUSCULAR; INTRAVENOUS at 11:12

## 2021-08-17 RX ADMIN — ACETAMINOPHEN 1000 MG: 500 TABLET ORAL at 16:19

## 2021-08-17 RX ADMIN — KETOROLAC TROMETHAMINE 30 MG: 30 INJECTION, SOLUTION INTRAMUSCULAR; INTRAVENOUS at 04:51

## 2021-08-17 RX ADMIN — METRONIDAZOLE 500 MG: 500 INJECTION, SOLUTION INTRAVENOUS at 06:50

## 2021-08-17 RX ADMIN — GABAPENTIN 100 MG: 100 CAPSULE ORAL at 08:16

## 2021-08-17 RX ADMIN — METOCLOPRAMIDE HYDROCHLORIDE 5 MG: 5 INJECTION INTRAMUSCULAR; INTRAVENOUS at 17:02

## 2021-08-17 RX ADMIN — DEXTROSE MONOHYDRATE, SODIUM CHLORIDE, AND POTASSIUM CHLORIDE 100 ML/HR: 50; 4.5; 1.49 INJECTION, SOLUTION INTRAVENOUS at 03:54

## 2021-08-17 RX ADMIN — KETOROLAC TROMETHAMINE 30 MG: 30 INJECTION, SOLUTION INTRAMUSCULAR; INTRAVENOUS at 18:00

## 2021-08-17 RX ADMIN — ACETAMINOPHEN 1000 MG: 500 TABLET ORAL at 08:16

## 2021-08-17 RX ADMIN — Medication 10 ML: at 14:00

## 2021-08-17 RX ADMIN — HEPARIN SODIUM 5000 UNITS: 5000 INJECTION INTRAVENOUS; SUBCUTANEOUS at 08:16

## 2021-08-17 RX ADMIN — CEFTRIAXONE SODIUM 2 G: 2 INJECTION, POWDER, FOR SOLUTION INTRAMUSCULAR; INTRAVENOUS at 20:14

## 2021-08-17 RX ADMIN — DEXTROSE MONOHYDRATE, SODIUM CHLORIDE, AND POTASSIUM CHLORIDE 100 ML/HR: 50; 4.5; 1.49 INJECTION, SOLUTION INTRAVENOUS at 17:03

## 2021-08-17 RX ADMIN — GABAPENTIN 100 MG: 100 CAPSULE ORAL at 16:19

## 2021-08-17 RX ADMIN — METRONIDAZOLE 500 MG: 500 INJECTION, SOLUTION INTRAVENOUS at 19:00

## 2021-08-17 RX ADMIN — GABAPENTIN 100 MG: 100 CAPSULE ORAL at 21:09

## 2021-08-17 RX ADMIN — ACETAMINOPHEN 1000 MG: 500 TABLET ORAL at 21:10

## 2021-08-17 RX ADMIN — METOCLOPRAMIDE HYDROCHLORIDE 5 MG: 5 INJECTION INTRAMUSCULAR; INTRAVENOUS at 23:24

## 2021-08-17 RX ADMIN — HEPARIN SODIUM 5000 UNITS: 5000 INJECTION INTRAVENOUS; SUBCUTANEOUS at 16:21

## 2021-08-17 RX ADMIN — SODIUM PHOSPHATE, MONOBASIC, MONOHYDRATE: 276; 142 INJECTION, SOLUTION INTRAVENOUS at 22:43

## 2021-08-17 NOTE — PROGRESS NOTES
Transition of Care (SILVINO) Plan:     Chart reviewed, pt admitted for planned surgical procedure with Dr.L Gama Barragan for perforated diverticulitis, at this time transition of care has not been finalized, PT and OT on case to assist with safe d/c planning, cm will cont to review and remain available. SILVINO Transportation:   How is patient being transported at discharge? Family/Friend      When? Once discharge criteria met     Is transport scheduled? N/A      Follow-up appointment and transportation:   PCP/Specialist?  See AVS for Appointment         Who is transporting to the follow-up appointment? Family/Friend      Is transport for follow up appointment scheduled? N/A    Communication plan (with patient/family): Who is being called? Patient or Next of Kin? Responsible party? Patient      What number(s) is to be used? See Facesheet      What service provider is calling for [facility] services? When are they calling? 24-48 hours following discharge    Readmission Risk? (Green/Low; Yellow/Moderate; Red/High):  Green  Care Management Interventions  PCP Verified by CM:  Yes  Palliative Care Criteria Met (RRAT>21 & CHF Dx)?: No  Physical Therapy Consult: Yes  Occupational Therapy Consult: Yes  Current Support Network: Lives with Spouse  Confirm Follow Up Transport: Family

## 2021-08-17 NOTE — PROGRESS NOTES
Bedside and Verbal shift change report given to Rodrigo 112 (oncoming nurse) by Jose Gooden RN (offgoing nurse). Report included the following information SBAR, Kardex, MAR and Recent Results.

## 2021-08-17 NOTE — CONSULTS
Comprehensive Nutrition Assessment    Type and Reason for Visit: Initial, Consult    Nutrition Recommendations/Plan: Diet: advance diet past clear liquids per MD to meet 100% of nutritional needs    Nutrition Assessment:  Patient admitted for Diverticulitis of large intestine with abscess, s/p LOW ANTERIOR RESECTION, MOBILIZATION OF SPLENIC FLEXURE, OMENTAL FLAP, ABSCESS DRAINAGE, CYSTOSCOPY WITH URETERAL STENT PLACEMENT. Consulted for ERAS. Estimated Daily Nutrient Needs:  Energy (kcal): 2160; Weight Used for Energy Requirements: Current  Protein (g): ; Weight Used for Protein Requirements: Current (1-1.1)  Fluid (ml/day): 2160; Method Used for Fluid Requirements: 1 ml/kcal    Nutrition Related Findings:  Lab:phos 2.3, Ca 7.7. +BM 8/16. Patient is on clear liquids and ONS are already ordered. Attempted to call patient- no answer. Noted PO varies between 240-500ml per flowsheet documentation. Wounds:    Surgical incision       Current Nutrition Therapies:  ADULT DIET Clear Liquid  ADULT ORAL NUTRITION SUPPLEMENT Lunch, Dinner; Standard High Calorie/High Protein    Anthropometric Measures:  · Height:  5' 11.5\" (181.6 cm)  · Current Body Wt:  98 kg (216 lb 0.8 oz)   · Admission Body Wt:  238 lb 1.6 oz    · Ideal Body Wt:  175 lbs:  123.5 %   · BMI Category: Overweight (BMI 25.0-29. 9)       Nutrition Diagnosis:   · Inadequate energy intake related to acute injury/trauma as evidenced by NPO or clear liquid status due to medical condition    Nutrition Interventions:   Food and/or Nutrient Delivery: Modify current diet, Continue current diet, Continue oral nutrition supplement  Nutrition Education and Counseling: No recommendations at this time  Coordination of Nutrition Care: Continue to monitor while inpatient    Goals:  Advance diet past clear liquids within the next 2-3 days       Nutrition Monitoring and Evaluation:   Behavioral-Environmental Outcomes: None identified  Food/Nutrient Intake Outcomes: Diet advancement/tolerance, Food and nutrient intake, Supplement intake  Physical Signs/Symptoms Outcomes: Biochemical data, Meal time behavior, Skin, Weight, GI status, Nausea/vomiting    Discharge Planning:     Too soon to determine     Electronically signed by Clara Norman RD on 8/17/2021 at 8:56 AM

## 2021-08-17 NOTE — PROGRESS NOTES
Assumed care of pt at this time. Assessment complete. Pt alert and oriented x 4. Showing no signs of distress. Denies SOB and chest pain. Pt lungs clear bilaterally. Pt has + bowel sounds. Pt denies passing gas. Stated pain 4 /10. Pt has 18G IV to L hand. Pt has 18G IV to R hand Dressing to abdomen CDI . SCDS a applied to bilaterally. Pt has a KEANU drain that is charged, patent and draining serosanguinous. Pt has not voided since hamilton removal. Pt encouraged to continue use of IS. Pt verbalized understanding. Ice pack in place. Fall risk armband intact. Call light and possessions within reach. Bed in low position with wheels locked. Will continue to monitor. 0825-Pt ambulated in room. Pt is in chair eating breakfast.     0902-Nurse walked with patient to nurses station and back. Pt back in bed at this time. Call light within reach. 1224-Pt ambulated to bathroom at this time. 1251-Pt walking in hallway with PT at this time    1300-Pt is sitting in chair and eating lunch. 1608-Pt walked in hallway with wife at this time    1619-Called pharmacy to stock pexis with D5-0.45 NACL with 20K. They will bring it up.     1757-Nurse changed packing and changed dressing. Nurse packed with a 4x4 guaze and applied a mepilex. 1756-Pt is up sitting in chair eating. Shift Summary-  Pt is alert and oriented x 4. Pt had uneventful shift. Pt ambulating and voiding sufficient amounts. Pt has not had any PRN pain medication. Pt has a KEANU drain that is patent and draining.

## 2021-08-17 NOTE — PROGRESS NOTES
Problem: Mobility Impaired (Adult and Pediatric)  Goal: *Acute Goals and Plan of Care (Insert Text)  Description: Physical Therapy short term goals initiated 08/16/21, to be achieved in 3-7 days. Pt will:   1. Perform bed mobility with S in prep for OOB activity. 2. Perform sit <> stand transfers with LRAD and S in prep for functional mobility and ambulation. 3. Ambulate 200 ft with LRAD and S in prep for household and community mobility. 4. Ascend/descend >2 stairs with 1 HR and S for safe home entry. Outcome: Progressing Towards Goal   PHYSICAL THERAPY TREATMENT AND DISCHARGE    Patient: Nery Meneses (61 y.o. male)  Date: 8/17/2021  Diagnosis: Diverticulitis of large intestine with abscess [K57.20] <principal problem not specified>  Procedure(s) (LRB):  ROBOTIC ASSIST LOW ANTERIOR RESECTION, MOBILIZATION OF SPLENIC FLEXURE, OMENTAL FLAP, ABSCESS DRAINAGE, CYSTOSCOPY WITH URETERAL STENT PLACEMENT (ERAS PROTOCOL) (N/A) 1 Day Post-Op  Precautions:  PLOF: independent, ambulatory without AD    ASSESSMENT:  Pt performed a log roll with VC and use of bed rail to sit up EOB. Sit to stand performed without difficulty. Ambulated 60ft with RW then 60ft without RW, steady reciprocal gt pattern, no LOB or path deviations. Negotiated 2 steps holding (B) hand rails then 2 steps holding R hand rail only. Returned to room and left sitting in chair for lunch. Pt is safe to ambulate in the halls without AD until d/c home. PLAN:  Maximum therapeutic gains met at current level of care and patient will be discharged from physical therapy at this time. Rationale for discharge:  [x]     Goals Achieved  []     [address]  []     Patient not participating in therapy  []     Other:  Discharge Recommendations:  Home Health vs None  Further Equipment Recommendations for Discharge:  N/A     SUBJECTIVE:   Patient stated I just got back into bed, I was sitting in the chair.     OBJECTIVE DATA SUMMARY:   Critical Behavior:  Neurologic State: Alert, Appropriate for age    Functional Mobility Training:  Bed Mobility:  Rolling: Modified independent  Supine to Sit: Modified independent    Scooting: Supervision  Transfers:  Sit to Stand: Supervision  Stand to Sit: Supervision  Balance:  Sitting: Intact  Standing: Intact; With support; Without support  Standing - Static: Good  Standing - Dynamic : Good   Ambulation/Gait Training:  Distance (ft): 120 Feet (ft)  Assistive Device: Gait belt;Walker, rolling  Stairs:  Number of Stairs Trained: 4  Stairs - Level of Assistance: Supervision  Rail Use: Both        Pain:  Pain level pre-treatment: 5/10   Pain level post-treatment: 5/10   Pain Intervention(s): Medication (see MAR); Rest, Ice, Repositioning   Response to intervention: Nurse notified, See doc flow    Activity Tolerance:   Good  Please refer to the flowsheet for vital signs taken during this treatment. After treatment:   [x] Patient left in no apparent distress sitting up in chair  [] Patient left in no apparent distress in bed  [x] Call bell left within reach  [x] Nursing notified  [] Caregiver present  [] Bed alarm activated  [] SCDs applied      COMMUNICATION/EDUCATION:   [x]         Role of Physical Therapy in the acute care setting. [x]         Fall prevention education was provided and the patient/caregiver indicated understanding. [x]         Patient/family have participated as able and agree with findings and recommendations. []         Patient is unable to participate in plan of care at this time.   []         Other:        Shanice Georges PTA   Time Calculation: 15 mins

## 2021-08-17 NOTE — PROGRESS NOTES
Problem: Falls - Risk of  Goal: *Absence of Falls  Description: Document Keith Chang Fall Risk and appropriate interventions in the flowsheet.   Outcome: Progressing Towards Goal  Note: Fall Risk Interventions:  Mobility Interventions: Communicate number of staff needed for ambulation/transfer, Patient to call before getting OOB         Medication Interventions: Assess postural VS orthostatic hypotension, Patient to call before getting OOB, Teach patient to arise slowly    Elimination Interventions: Call light in reach, Patient to call for help with toileting needs

## 2021-08-17 NOTE — PROGRESS NOTES
Problem: Self Care Deficits Care Plan (Adult)  Goal: *Acute Goals and Plan of Care (Insert Text)  Description: Occupational Therapy Goals  Initiated 8/16/2021 within 7 day(s). 1.  Patient will perform lower body dressing with supervision/set-up   2. Patient will perform toilet transfers with supervision/set-up. 3.  Patient will perform all aspects of toileting with supervision/set-up. 4.  Patient will complete standing for 5 minutes with supervision to increase activity tolerance for functional activities. 5.  Patient will utilize energy conservation techniques during functional activities with min verbal cues. Outcome: Resolved/Met   OCCUPATIONAL THERAPY TREATMENT/DISCHARGE    Patient: Rudolfo Duverney (41 y.o. male)  Date: 8/17/2021  Diagnosis: Diverticulitis of large intestine with abscess [K57.20] <principal problem not specified>  Procedure(s) (LRB):  ROBOTIC ASSIST LOW ANTERIOR RESECTION, MOBILIZATION OF SPLENIC FLEXURE, OMENTAL FLAP, ABSCESS DRAINAGE, CYSTOSCOPY WITH URETERAL STENT PLACEMENT (ERAS PROTOCOL) (N/A) 1 Day Post-Op  Precautions:      Chart, occupational therapy assessment, plan of care, and goals were reviewed. ASSESSMENT:  Pt found supine in bed, reporting pain 5/10, agreeable to therapy. Pt sat up to EOB with supervision. Reviewed adaptive strategies for lower body ADLs, pt unable to complete sock donning without assist. Provided/educated on use of sock aid/reacher for increased independence. Pt able to doff/don B socks with mod I using A/E. Pt supervision for STS/bathroom mobility, pt able to stand ~5 minutes. Pt returned to seated EOB, and returned to supine without assist. Provided opportunity for patient to voice questions/concerns on ADL performance when home, patient voiced no further concerns. Pt left supine in bed, call bell/phone within reach. PLAN:  Patient will be discharged from occupational therapy at this time.   Rationale for discharge:  [x] Goals Achieved  [] Plateau Reached  [] Patient not participating in therapy  [] Other:  Discharge Recommendations:  Home Health vs. none  Further Equipment Recommendations for Discharge:  N/A     SUBJECTIVE:   Patient stated i'm doing alright.     OBJECTIVE DATA SUMMARY:   Cognitive/Behavioral Status:  Neurologic State: Alert  Orientation Level: Oriented X4  Cognition: Follows commands, Appropriate decision making, Appropriate for age attention/concentration, Appropriate safety awareness  Safety/Judgement: Awareness of environment    Functional Mobility and Transfers for ADLs:   Bed Mobility:  Rolling: Supervision  Supine to Sit: Supervision  Sit to Supine: Supervision  Scooting: Supervision   Transfers:  Sit to Stand: Supervision    Balance:  Sitting: Intact  Standing: Intact; Without support  Standing - Static: Good  Standing - Dynamic : Good    ADL Intervention:  Lower Body Dressing Assistance  Dressing Assistance: Modified independent  Socks: Modified independent  Leg Crossed Method Used: No  Position Performed: Seated edge of bed  Cues: Verbal cues provided;Visual cues provided  Adaptive Equipment Used: Sock aid;Reacher    Cognitive Retraining  Safety/Judgement: Awareness of environment    Pain:  Pain level pre-treatment: 5/10   Pain level post-treatment: 5/10   Pain Intervention(s): Medication administered by RN (see MAR); Rest, Ice, Repositioning   Response to intervention: Nurse notified, See doc flow sheet    Activity Tolerance:    Fair. Pt able to stand ~5 minutes. Pt limited by strength, ROM, pain    Please refer to the flowsheet for vital signs taken during this treatment.   After treatment:   []  Patient left in no apparent distress sitting up in chair  [x]  Patient left in no apparent distress in bed  [x]  Call bell left within reach  [x]  Nursing notified  []  Caregiver present  []  Bed alarm activated    COMMUNICATION/EDUCATION:   [x]      Role of Occupational Therapy in the acute care setting  [x]      Home safety education was provided and the patient/caregiver indicated understanding. [x]      Patient/family have participated as able and agree with findings and recommendations. []      Patient is unable to participate in plan of care at this time. Thank you for allowing me to assist in the care of this patient.   Kapil Mc, OTR/L  Time Calculation: 16 mins

## 2021-08-18 LAB
ANION GAP SERPL CALC-SCNC: 7 MMOL/L (ref 3–18)
BUN SERPL-MCNC: 8 MG/DL (ref 7–18)
BUN/CREAT SERPL: 11 (ref 12–20)
CALCIUM SERPL-MCNC: 8.1 MG/DL (ref 8.5–10.1)
CHLORIDE SERPL-SCNC: 105 MMOL/L (ref 100–111)
CO2 SERPL-SCNC: 23 MMOL/L (ref 21–32)
CREAT SERPL-MCNC: 0.72 MG/DL (ref 0.6–1.3)
ERYTHROCYTE [DISTWIDTH] IN BLOOD BY AUTOMATED COUNT: 16.3 % (ref 11.6–14.5)
GLUCOSE SERPL-MCNC: 130 MG/DL (ref 74–99)
HCT VFR BLD AUTO: 31.8 % (ref 36–48)
HGB BLD-MCNC: 10.3 G/DL (ref 13–16)
MAGNESIUM SERPL-MCNC: 1.8 MG/DL (ref 1.6–2.6)
MCH RBC QN AUTO: 26.8 PG (ref 24–34)
MCHC RBC AUTO-ENTMCNC: 32.4 G/DL (ref 31–37)
MCV RBC AUTO: 82.6 FL (ref 74–97)
PHOSPHATE SERPL-MCNC: 2.4 MG/DL (ref 2.5–4.9)
PLATELET # BLD AUTO: 274 K/UL (ref 135–420)
PMV BLD AUTO: 10.2 FL (ref 9.2–11.8)
POTASSIUM SERPL-SCNC: 4.3 MMOL/L (ref 3.5–5.5)
RBC # BLD AUTO: 3.85 M/UL (ref 4.35–5.65)
SODIUM SERPL-SCNC: 135 MMOL/L (ref 136–145)
WBC # BLD AUTO: 15.1 K/UL (ref 4.6–13.2)

## 2021-08-18 PROCEDURE — 74011000250 HC RX REV CODE- 250: Performed by: SURGERY

## 2021-08-18 PROCEDURE — 74011250637 HC RX REV CODE- 250/637: Performed by: SURGERY

## 2021-08-18 PROCEDURE — 80048 BASIC METABOLIC PNL TOTAL CA: CPT

## 2021-08-18 PROCEDURE — 74011250636 HC RX REV CODE- 250/636: Performed by: SURGERY

## 2021-08-18 PROCEDURE — 65270000029 HC RM PRIVATE

## 2021-08-18 PROCEDURE — 85027 COMPLETE CBC AUTOMATED: CPT

## 2021-08-18 PROCEDURE — 36415 COLL VENOUS BLD VENIPUNCTURE: CPT

## 2021-08-18 PROCEDURE — 83735 ASSAY OF MAGNESIUM: CPT

## 2021-08-18 PROCEDURE — 84100 ASSAY OF PHOSPHORUS: CPT

## 2021-08-18 RX ORDER — SODIUM CHLORIDE, SODIUM LACTATE, POTASSIUM CHLORIDE, CALCIUM CHLORIDE 600; 310; 30; 20 MG/100ML; MG/100ML; MG/100ML; MG/100ML
150 INJECTION, SOLUTION INTRAVENOUS CONTINUOUS
Status: DISCONTINUED | OUTPATIENT
Start: 2021-08-18 | End: 2021-08-20

## 2021-08-18 RX ADMIN — GABAPENTIN 100 MG: 100 CAPSULE ORAL at 23:14

## 2021-08-18 RX ADMIN — HEPARIN SODIUM 5000 UNITS: 5000 INJECTION INTRAVENOUS; SUBCUTANEOUS at 18:32

## 2021-08-18 RX ADMIN — HEPARIN SODIUM 5000 UNITS: 5000 INJECTION INTRAVENOUS; SUBCUTANEOUS at 10:05

## 2021-08-18 RX ADMIN — METOCLOPRAMIDE HYDROCHLORIDE 5 MG: 5 INJECTION INTRAMUSCULAR; INTRAVENOUS at 06:11

## 2021-08-18 RX ADMIN — SODIUM CHLORIDE, SODIUM LACTATE, POTASSIUM CHLORIDE, AND CALCIUM CHLORIDE 150 ML/HR: 600; 310; 30; 20 INJECTION, SOLUTION INTRAVENOUS at 19:50

## 2021-08-18 RX ADMIN — ACETAMINOPHEN 1000 MG: 500 TABLET ORAL at 23:14

## 2021-08-18 RX ADMIN — KETOROLAC TROMETHAMINE 30 MG: 30 INJECTION, SOLUTION INTRAMUSCULAR; INTRAVENOUS at 06:11

## 2021-08-18 RX ADMIN — KETOROLAC TROMETHAMINE 30 MG: 30 INJECTION, SOLUTION INTRAMUSCULAR; INTRAVENOUS at 12:49

## 2021-08-18 RX ADMIN — METRONIDAZOLE 500 MG: 500 INJECTION, SOLUTION INTRAVENOUS at 06:15

## 2021-08-18 RX ADMIN — ACETAMINOPHEN 1000 MG: 500 TABLET ORAL at 10:05

## 2021-08-18 RX ADMIN — ATORVASTATIN CALCIUM 10 MG: 10 TABLET, FILM COATED ORAL at 10:05

## 2021-08-18 RX ADMIN — ACETAMINOPHEN 1000 MG: 500 TABLET ORAL at 16:35

## 2021-08-18 RX ADMIN — HEPARIN SODIUM 5000 UNITS: 5000 INJECTION INTRAVENOUS; SUBCUTANEOUS at 01:47

## 2021-08-18 RX ADMIN — CEFTRIAXONE SODIUM 2 G: 2 INJECTION, POWDER, FOR SOLUTION INTRAMUSCULAR; INTRAVENOUS at 20:34

## 2021-08-18 RX ADMIN — KETOROLAC TROMETHAMINE 30 MG: 30 INJECTION, SOLUTION INTRAMUSCULAR; INTRAVENOUS at 23:13

## 2021-08-18 RX ADMIN — METOCLOPRAMIDE HYDROCHLORIDE 5 MG: 5 INJECTION INTRAMUSCULAR; INTRAVENOUS at 23:13

## 2021-08-18 RX ADMIN — METRONIDAZOLE 500 MG: 500 INJECTION, SOLUTION INTRAVENOUS at 18:42

## 2021-08-18 RX ADMIN — AMLODIPINE BESYLATE 5 MG: 5 TABLET ORAL at 10:05

## 2021-08-18 RX ADMIN — GABAPENTIN 100 MG: 100 CAPSULE ORAL at 10:05

## 2021-08-18 RX ADMIN — KETOROLAC TROMETHAMINE 30 MG: 30 INJECTION, SOLUTION INTRAMUSCULAR; INTRAVENOUS at 18:32

## 2021-08-18 RX ADMIN — METOCLOPRAMIDE HYDROCHLORIDE 5 MG: 5 INJECTION INTRAMUSCULAR; INTRAVENOUS at 18:32

## 2021-08-18 RX ADMIN — GABAPENTIN 100 MG: 100 CAPSULE ORAL at 16:35

## 2021-08-18 RX ADMIN — METOCLOPRAMIDE HYDROCHLORIDE 5 MG: 5 INJECTION INTRAMUSCULAR; INTRAVENOUS at 12:48

## 2021-08-18 NOTE — PROGRESS NOTES
2309 - Bedside shift report received from [facility], [facility]. Assumed care of patient. Patient noted resting in bed at this time. Call light in reach. 3589 - Patient noted ambulating in room and to bathroom. 1005 - Assessment completed. Patient is alert and oriented x 4. Lung sounds clear bilaterally. Respirations even and unlabored. No use of accessory muscles. Abdomen is distended and tender. Bowel sounds hypoactive to all quadrants. Patient voiding without difficulty. Skin is warm, dry and skin color is appropriate to race. Skin glue intact to trocar sites x 3. Gauze dressing to KEANU drain site noted intact with old drainage noted. No other skin integrity issues present. Sequential compression device applied to BLE. IV intact to left cephalic and infusing without difficulty. Reports pain 0/10 at rest. Patient oriented to phone and how to order meals. Call bell within reach. Bed in low position. Three side rails up. [id number] - Patient noted ambulating in hallway with spouse. Dressing to KEANU drain site noted saturated and changed. [id number] - Patient noted with tachycardia. Reports had just ambulated to restroom before vitals checked by CNA. HR noted 108 at recheck at rest. Denies any pain at rest. Reports pain 6/10 with movement. Noted with congestion. Lungs clear bilaterally. Patient encouraged to use incentive spirometer and to ambulate. 1705 - Patient noted ambulating in hallway with spouse. 1822 - Rechecked vitals. HR and BP noted elevated. 200 - Dr. Mona Tai present and notified of elevated HR and BP.

## 2021-08-18 NOTE — PROGRESS NOTES
1915 - Bedside report received from [place], [facility]0 [facility]. Patient in bed. Pain 3/10.     2015 - Patient in bed at this time. IV to RH and LH  intact and patent. Sequential compression device bilaterally. Dressing to abd. incision CDI. Lap sites dry and intact. KEANU with small amnts of sero sang drge. + CMS. Pt A & O x 4. LS clear, dim, on RA. Abdomen soft, tender and a little distended. + BS to all 4 quadrants. Reports some nausea. Denies gas but is burping. Pain 3/10. Call light within reach. 2115-Pt ambulated to the BR.    0140-Pt reports a sinus headache, even though he got the scheduled Toradol and Tylenol earlier. Says he uses Tiger Balm at home. Says he he will wait as it may go away. Will monitor. 0230-Both pt's IV access lost, not flushing no blood returns. RN attempted a re-start, no success. Medic called. Pt reports he just had a small BM. He says he is not sure if he passed gas. 0330-Pt IV re-started. Pt reports that sinus HA is gone. 0410-Pt ambulated to the BR, thought he was going to have a BM, but did not. Still denies gas though. 0615-Pt reports that he just had another medium BM, and that he saw some blood on it, that it was loose. Pt enc. To call staff next time he has one so they can see the stool. Pt says he is not sure if he passed gas, still reports burping, says nausea is gone. All other assessments unchanged.

## 2021-08-18 NOTE — PROGRESS NOTES
POD#2  Pt feeling well, no n/v or burping, kristi small amt full liquid, 2 BMs w flatus, pain control adequate  Afeb, tachy  KEANU 155 yesterday    RRR, tachy  CTA  Softly distended, +BS, incisions c/d w dressings, KEANU site c/d w serosang in bulb  No calf tender    WBC up to 15, H/H stable, e'lytes ok    S/p lap LAR for perf tic  Tachy -- IVF restarted  OOB  Full liquids for now  Monitor KEANU output  Monitor WBC -- cont IV abx.

## 2021-08-18 NOTE — PROGRESS NOTES
Subjective:   Diet: Adequate intake (tolerating clears, burping). Patient reports no nausea or vomiting. Activity level: Normal with assistance. Pain control: Well controlled. Wound: Painful. Temp:  [97.1 °F (36.2 °C)-98.3 °F (36.8 °C)]   Pulse (Heart Rate):  []   BP: ()/(44-76)   Resp Rate:  [12-28]   O2 Sat (%):  [95 %-100 %]   Weight:  [98 kg (216 lb)]   [unfilled]  [facility]  Objective:  Vital signs (most recent): Blood pressure 132/75, pulse 91, temperature 97.3 °F (36.3 °C), resp. rate 17, height 5' 11.5\" (1.816 m), weight 98 kg (216 lb), SpO2 98 %. General appearance: Comfortable and in no acute distress. Lungs:  Normal effort. He is in no respiratory distress. Heart: Normal rate. Bowel sounds: Bowel sounds are hypoactive. Tenderness: There is tenderness in the incisional area. There is no guarding. Wound:  Clean. Wound drainage description: granulating well packed with gauze. Extremities: There is normal range of motion.     Neurological: The patient is alert and oriented to person, place and time.       CHEMISTRY        SODIUM 136-145 (mmol/L) 137     POTASSIUM 3.5-5.5 (mmol/L) 4.6     CHLORIDE 100-111 (mmol/L) 110     CO2 TOTAL 21-32 (mmol/L) 21     ANION GAP 3.0-18 (mmol/L) 6     GLUCOSE 74-99 (mg/dL) 198High      BUN 7.0-18 (MG/DL) 11     CREATININE 0.6-1.3 (MG/DL) 0.96     BUN/CREAT RATIO BUCR 12-20 ( ) 11Low      CALCIUM 8.5-10.1 (MG/DL) 7.7Low      PHOSPHORUS 2.5-4.9 (MG/DL)   2.3Low    MAGNESIUM 1.6-2.6 (mg/dL)  1.9    EST GFR-NA >60 (ml/min/1.73m2) >60     EST GFR-AA >60 (ml/min/1.73m2) >60        08/17/21 0230 - 08/17/21 0230     HEMATOLOGY  08/17/21 0230     Specimen Information Collected 08/17/21 0230      Type Whole Blood      Source Whole Blood      Result Status Final result      HEMATOLOGY       WBC COUNT 4.6-13.2 (K/uL) 9.6     RBC RBC 4.35-5.65 (M/uL) 3.85Low      HEMOGLOBIN 13.0-16.0 (g/dL) 10.4Low      HEMATOCRIT 36.0-48.0 (%) 32.2Low      MCV MCV 74.0-97.0 (FL) 83.6     MCH MCH 24.0-34.0 (PG) 27.0     MCHC MCHC 31.0-37.0 (g/dL) 32.3     RDW RDW 11.6-14.5 (%) 16.1High      PLATELET COUNT 455-648 (K/uL) 256     MPV 9.2-11.8 (FL) 9.9              Active Problems:    Diverticulitis of large intestine with abscess (8/16/2021)      Assessment:   Post-op: 1 day. Condition: In stable condition. Plan:  Encourage ambulation. Change dressings as ordered and wet to dry dressing changes. Start/continue incentive spirometry. Drains Plan: continue SS outpt. Consults: dietary and physical therapy. Clear liquid and advance diet as tolerated. Recheck labs as ordered and resume oral medications.

## 2021-08-18 NOTE — ROUTINE PROCESS
Bedside and Verbal shift change report given to JULEE Duran, by Garth Cool. Report included the following information SBAR, Kardex, OR Summary, Intake/Output and MAR.

## 2021-08-19 LAB
ANION GAP SERPL CALC-SCNC: 5 MMOL/L (ref 3–18)
BUN SERPL-MCNC: 8 MG/DL (ref 7–18)
BUN/CREAT SERPL: 12 (ref 12–20)
C DIFF GDH STL QL: NEGATIVE
C DIFF TOX A+B STL QL IA: NEGATIVE
CALCIUM SERPL-MCNC: 8 MG/DL (ref 8.5–10.1)
CHLORIDE SERPL-SCNC: 105 MMOL/L (ref 100–111)
CO2 SERPL-SCNC: 25 MMOL/L (ref 21–32)
CREAT SERPL-MCNC: 0.68 MG/DL (ref 0.6–1.3)
ERYTHROCYTE [DISTWIDTH] IN BLOOD BY AUTOMATED COUNT: 16.2 % (ref 11.6–14.5)
GLUCOSE SERPL-MCNC: 125 MG/DL (ref 74–99)
HCT VFR BLD AUTO: 28.2 % (ref 36–48)
HGB BLD-MCNC: 9.2 G/DL (ref 13–16)
INTERPRETATION: NORMAL
MAGNESIUM SERPL-MCNC: 1.8 MG/DL (ref 1.6–2.6)
MCH RBC QN AUTO: 27.1 PG (ref 24–34)
MCHC RBC AUTO-ENTMCNC: 32.6 G/DL (ref 31–37)
MCV RBC AUTO: 83.2 FL (ref 74–97)
PHOSPHATE SERPL-MCNC: 1.3 MG/DL (ref 2.5–4.9)
PLATELET # BLD AUTO: 271 K/UL (ref 135–420)
PMV BLD AUTO: 10 FL (ref 9.2–11.8)
POTASSIUM SERPL-SCNC: 3.7 MMOL/L (ref 3.5–5.5)
RBC # BLD AUTO: 3.39 M/UL (ref 4.35–5.65)
SODIUM SERPL-SCNC: 135 MMOL/L (ref 136–145)
WBC # BLD AUTO: 18.8 K/UL (ref 4.6–13.2)

## 2021-08-19 PROCEDURE — 87449 NOS EACH ORGANISM AG IA: CPT

## 2021-08-19 PROCEDURE — 85027 COMPLETE CBC AUTOMATED: CPT

## 2021-08-19 PROCEDURE — 83735 ASSAY OF MAGNESIUM: CPT

## 2021-08-19 PROCEDURE — 74011250637 HC RX REV CODE- 250/637: Performed by: SURGERY

## 2021-08-19 PROCEDURE — 74011000250 HC RX REV CODE- 250: Performed by: SURGERY

## 2021-08-19 PROCEDURE — 84100 ASSAY OF PHOSPHORUS: CPT

## 2021-08-19 PROCEDURE — 36415 COLL VENOUS BLD VENIPUNCTURE: CPT

## 2021-08-19 PROCEDURE — 74011250636 HC RX REV CODE- 250/636: Performed by: SURGERY

## 2021-08-19 PROCEDURE — 65270000029 HC RM PRIVATE

## 2021-08-19 PROCEDURE — 80048 BASIC METABOLIC PNL TOTAL CA: CPT

## 2021-08-19 RX ORDER — FUROSEMIDE 10 MG/ML
20 INJECTION INTRAMUSCULAR; INTRAVENOUS ONCE
Status: COMPLETED | OUTPATIENT
Start: 2021-08-19 | End: 2021-08-19

## 2021-08-19 RX ADMIN — ACETAMINOPHEN 1000 MG: 500 TABLET ORAL at 21:00

## 2021-08-19 RX ADMIN — GABAPENTIN 100 MG: 100 CAPSULE ORAL at 10:19

## 2021-08-19 RX ADMIN — METOCLOPRAMIDE HYDROCHLORIDE 5 MG: 5 INJECTION INTRAMUSCULAR; INTRAVENOUS at 17:15

## 2021-08-19 RX ADMIN — Medication 10 ML: at 21:00

## 2021-08-19 RX ADMIN — GABAPENTIN 100 MG: 100 CAPSULE ORAL at 21:00

## 2021-08-19 RX ADMIN — AMLODIPINE BESYLATE 5 MG: 5 TABLET ORAL at 10:19

## 2021-08-19 RX ADMIN — KETOROLAC TROMETHAMINE 30 MG: 30 INJECTION, SOLUTION INTRAMUSCULAR; INTRAVENOUS at 05:24

## 2021-08-19 RX ADMIN — MORPHINE SULFATE 2 MG: 4 INJECTION INTRAVENOUS at 18:56

## 2021-08-19 RX ADMIN — METOCLOPRAMIDE HYDROCHLORIDE 5 MG: 5 INJECTION INTRAMUSCULAR; INTRAVENOUS at 05:24

## 2021-08-19 RX ADMIN — KETOROLAC TROMETHAMINE 30 MG: 30 INJECTION, SOLUTION INTRAMUSCULAR; INTRAVENOUS at 13:12

## 2021-08-19 RX ADMIN — METRONIDAZOLE 500 MG: 500 INJECTION, SOLUTION INTRAVENOUS at 18:16

## 2021-08-19 RX ADMIN — Medication 10 ML: at 17:16

## 2021-08-19 RX ADMIN — HEPARIN SODIUM 5000 UNITS: 5000 INJECTION INTRAVENOUS; SUBCUTANEOUS at 10:18

## 2021-08-19 RX ADMIN — HEPARIN SODIUM 5000 UNITS: 5000 INJECTION INTRAVENOUS; SUBCUTANEOUS at 01:00

## 2021-08-19 RX ADMIN — METRONIDAZOLE 500 MG: 500 INJECTION, SOLUTION INTRAVENOUS at 06:22

## 2021-08-19 RX ADMIN — ACETAMINOPHEN 1000 MG: 500 TABLET ORAL at 17:15

## 2021-08-19 RX ADMIN — FUROSEMIDE 20 MG: 10 INJECTION, SOLUTION INTRAMUSCULAR; INTRAVENOUS at 17:15

## 2021-08-19 RX ADMIN — ACETAMINOPHEN 1000 MG: 500 TABLET ORAL at 10:19

## 2021-08-19 RX ADMIN — ATORVASTATIN CALCIUM 10 MG: 10 TABLET, FILM COATED ORAL at 10:22

## 2021-08-19 RX ADMIN — GABAPENTIN 100 MG: 100 CAPSULE ORAL at 17:15

## 2021-08-19 RX ADMIN — CEFTRIAXONE SODIUM 2 G: 2 INJECTION, POWDER, FOR SOLUTION INTRAMUSCULAR; INTRAVENOUS at 20:57

## 2021-08-19 RX ADMIN — METOCLOPRAMIDE HYDROCHLORIDE 5 MG: 5 INJECTION INTRAMUSCULAR; INTRAVENOUS at 13:12

## 2021-08-19 RX ADMIN — HEPARIN SODIUM 5000 UNITS: 5000 INJECTION INTRAVENOUS; SUBCUTANEOUS at 17:14

## 2021-08-19 NOTE — PROGRESS NOTES
1950 - Bedside report received from [place], [facility]. Patient in bed. Pain 3/10. PPt still cindy, RN got orders for IVF, LR initiated at 150. Will monitor. 2035 - Patient in bed at this time. IV to LUE  intact and patent. Sequential compression device bilaterally. Dressing to abd with some scant amnts of sero sang. drge to the lower end. KEANU with scant amnts of sero sang. drge. KEANU site with scant amnts of drge, team aware per report. + CMS. Pt A & O x 4. LS clear, on RA. Abdomen soft, slightly distended, tender. + BS to all 4 quadrants. Denies nausea for now. Reports burping and gas. Had BMs today per report. Pain 3/10. Call light within reach. HR at 113 now, will monitor. 2317-HR trending down. 0535-0Pt reports nausea now. Reglan given. Says he had 3 soft, brown BMs, describes them as \"pieces\". Pt had an uneventful shift. Uses IS every hour while awake. Pt ambulated with assistance, at times independently. Pain remained well-controlled with the ordered medication. No issues/concerns at this time.  Call bell within reach

## 2021-08-19 NOTE — PROGRESS NOTES
Nutrition Assessment     Type and Reason for Visit: Reassess    Nutrition Recommendations/Plan: Continue w/ POC    Nutrition Assessment:  Patient admitted for Diverticulitis of large intestine with abscess, s/p LOW ANTERIOR RESECTION, MOBILIZATION OF SPLENIC FLEXURE, OMENTAL FLAP, ABSCESS DRAINAGE, CYSTOSCOPY WITH URETERAL STENT PLACEMENT. Diet has been advanced from clear liquid to full liquid. Estimated Daily Nutrient Needs:  Energy (kcal):  2160  Protein (g):  98       Fluid (ml/day):  2160    Nutrition Related Findings:  Lab: Na 135, K 1.3. Med: reglan, KCl as needed, mag sulfate as needed, lactetd ringers. +BM 8/18. Noted PO intake per flowsheet documentation: 25-50% x2 on 8/18; % x3 on 8/17.       Current Nutrition Therapies:  ADULT ORAL NUTRITION SUPPLEMENT Lunch, Dinner; Standard High Calorie/High Protein  ADULT DIET Full Liquid    Anthropometric Measures:  · Height:  5' 11.5\" (181.6 cm)  · Current Body Wt:  98 kg (216 lb 0.8 oz)  · BMI: 29.7    Nutrition Diagnosis:   · Predicted inadequate energy intake related to altered GI function as evidenced by intake 26-50%    Nutrition Intervention:  Food and/or Nutrient Delivery: Continue current diet, Continue oral nutrition supplement  Nutrition Education and Counseling: No recommendations at this time  Coordination of Nutrition Care: Continue to monitor while inpatient    Goals:  PO intake >75% of most meals and supplements throughout the next 3-5 days       Nutrition Monitoring and Evaluation:   Behavioral-Environmental Outcomes: None identified  Food/Nutrient Intake Outcomes: Food and nutrient intake, Supplement intake, Diet advancement/tolerance  Physical Signs/Symptoms Outcomes: Biochemical data, Meal time behavior, Skin, Weight, GI status    Discharge Planning:    Continue current diet, Continue oral nutrition supplement     Electronically signed by Anirudh Urias RD on 8/19/2021 at 9:37 AM

## 2021-08-19 NOTE — PROGRESS NOTES
Problem: Falls - Risk of  Goal: *Absence of Falls  Description: Document Wendy King Fall Risk and appropriate interventions in the flowsheet. Outcome: Progressing Towards Goal  Note: Fall Risk Interventions:  Mobility Interventions: Communicate number of staff needed for ambulation/transfer, Patient to call before getting OOB         Medication Interventions: Patient to call before getting OOB    Elimination Interventions: Call light in reach, Patient to call for help with toileting needs              Problem: Patient Education: Go to Patient Education Activity  Goal: Patient/Family Education  Outcome: Progressing Towards Goal     Problem: Patient Education: Go to Patient Education Activity  Goal: Patient/Family Education  Outcome: Progressing Towards Goal     Problem: Patient Education: Go to Patient Education Activity  Goal: Patient/Family Education  Outcome: Progressing Towards Goal     Problem: Surgical Pathway Day of Surgery  Goal: Activity/Safety  Outcome: Progressing Towards Goal  Goal: Consults, if ordered  Outcome: Progressing Towards Goal  Goal: Nutrition/Diet  Outcome: Progressing Towards Goal  Goal: Medications  Outcome: Progressing Towards Goal  Goal: Respiratory  Outcome: Progressing Towards Goal  Goal: Treatments/Interventions/Procedures  Outcome: Progressing Towards Goal  Goal: Psychosocial  Outcome: Progressing Towards Goal  Goal: *No signs and symptoms of infection or wound complications  Outcome: Progressing Towards Goal  Goal: *Optimal pain control at patient's stated goal  Outcome: Progressing Towards Goal  Goal: *Adequate urinary output (equal to or greater than 30 milliliters/hour)  Description: Ambulatory Surgery patients voiding without difficulty.   Outcome: Progressing Towards Goal  Goal: *Hemodynamically stable  Outcome: Progressing Towards Goal  Goal: *Tolerating diet  Outcome: Progressing Towards Goal  Goal: *Demonstrates progressive activity  Outcome: Progressing Towards Goal Problem: Surgical Pathway Post-Op Day 1  Goal: Activity/Safety  Outcome: Progressing Towards Goal  Goal: Diagnostic Test/Procedures  Outcome: Progressing Towards Goal  Goal: Nutrition/Diet  Outcome: Progressing Towards Goal  Goal: Discharge Planning  Outcome: Progressing Towards Goal  Goal: Medications  Outcome: Progressing Towards Goal  Goal: Respiratory  Outcome: Progressing Towards Goal  Goal: Treatments/Interventions/Procedures  Outcome: Progressing Towards Goal  Goal: Psychosocial  Outcome: Progressing Towards Goal  Goal: *No signs and symptoms of infection or wound complications  Outcome: Progressing Towards Goal  Goal: *Optimal pain control at patient's stated goal  Outcome: Progressing Towards Goal  Goal: *Adequate urinary output (equal to or greater than 30 milliliters/hour)  Outcome: Progressing Towards Goal  Goal: *Hemodynamically stable  Outcome: Progressing Towards Goal  Goal: *Tolerating diet  Outcome: Progressing Towards Goal  Goal: *Demonstrates progressive activity  Outcome: Progressing Towards Goal  Goal: *Lungs clear or at baseline  Outcome: Progressing Towards Goal

## 2021-08-19 NOTE — ROUTINE PROCESS
Bedside and Verbal shift change report given to Madison Baker RN by Salima Faye. Report included the following information SBAR, Kardex, OR Summary, Intake/Output and MAR.

## 2021-08-19 NOTE — PROGRESS NOTES
7801 - Bedside report received from [facility]. Patient in chair at this time. Pain 2/10. Plan of care for the day addressed with the patient. Drain site shows old drainage    1000 - Patient in bed at this time. A/O x 4. IV  intact and patent. SCDs  to BLE. Optifoam dressing to midline incision has quarter sized spot of old drainage. Patient denies numbness/tingling. Pedal pulses palpable. Lungs clear. Bowel sounds active to all quadrants. Patient able to get to 700 on the incentive spirometer. Pain 1/10.     1042-Notified Dr. Chelsy Valdivia that patient has MEWS 3 with RR 21 and patient is slightly tachycardic at 101, has been slightly tachycardic overnight. Patient is passing flatus and has had a water bowel movement with chunks. Patient has very active bowel sounds that can be heard by nurse standing at the computer. Drain put out 5 mL this morning. Scheduled reglan not covering patient's nausea. Per Dr. Chelsy Valdivia, she will come see patient this afternoon. Per Dr. Chelsy Valdivia, order 4 mg IV zofran Q4 PRN. 1056-Left vm for Dr. Chelsy Valdivia asking if she wanted fluids to continue for patient as rate is 150 mL/hr, and patient BP is 150s/80s. Lung sounds clear. RR 21.     1512-Took verbal order with readback for C diff  and enteric precautions, lasix 20 mg IV and lock the IV. 1800-Patient aspuse notified this nurse patient had \"strange looking \" bowel movement. Bowel movement watery with black and black green \"flakes\". Took stool sample to lab and notified Dr. Chelsy Valdivia via message on her cell. 1805-dressing change performed with guaze and optifoam.    1857 - Pain 7/10. PRN 2 mg IV morphine pain medication administered at this time. Patient has been educated on side effects. 1925- Bedside and Verbal shift change report given to Ariana Jiang RN by Hailee Lama RN. Report included the following information SBAR, Kardex, OR Summary, Intake/Output and MAR.

## 2021-08-19 NOTE — PROGRESS NOTES
Problem: Falls - Risk of  Goal: *Absence of Falls  Description: Document Monie Woodsergio Fall Risk and appropriate interventions in the flowsheet.   Outcome: Progressing Towards Goal  Note: Fall Risk Interventions:  Mobility Interventions: Communicate number of staff needed for ambulation/transfer, Patient to call before getting OOB         Medication Interventions: Patient to call before getting OOB, Teach patient to arise slowly    Elimination Interventions: Call light in reach, Patient to call for help with toileting needs

## 2021-08-19 NOTE — PROGRESS NOTES
Chart reviewed, pt has been up ambulating with assist and at time independently, D/C plan consist of returning back home with family, cm will cont to review, last PT eval on 8-16-21 with recommendation for home health, pt doing will and possibly may not need H/H services at time of discharge, will cont to review.

## 2021-08-19 NOTE — ROUTINE PROCESS
Bedside and Verbal shift change report given to Katlyn Quiles RN (oncoming nurse) by CEE Craig RN (offgoing nurse). Report included the following information SBAR, Kardex, OR Summary, Intake/Output, MAR and Recent Results.

## 2021-08-20 LAB
ANION GAP SERPL CALC-SCNC: 4 MMOL/L (ref 3–18)
BASOPHILS # BLD: 0 K/UL (ref 0–0.1)
BASOPHILS NFR BLD: 0 % (ref 0–2)
BUN SERPL-MCNC: 15 MG/DL (ref 7–18)
BUN/CREAT SERPL: 22 (ref 12–20)
CALCIUM SERPL-MCNC: 7.7 MG/DL (ref 8.5–10.1)
CHLORIDE SERPL-SCNC: 102 MMOL/L (ref 100–111)
CO2 SERPL-SCNC: 28 MMOL/L (ref 21–32)
CREAT SERPL-MCNC: 0.68 MG/DL (ref 0.6–1.3)
DIFFERENTIAL METHOD BLD: ABNORMAL
EOSINOPHIL # BLD: 0 K/UL (ref 0–0.4)
EOSINOPHIL NFR BLD: 0 % (ref 0–5)
ERYTHROCYTE [DISTWIDTH] IN BLOOD BY AUTOMATED COUNT: 16.3 % (ref 11.6–14.5)
GLUCOSE SERPL-MCNC: 117 MG/DL (ref 74–99)
HCT VFR BLD AUTO: 26.9 % (ref 36–48)
HGB BLD-MCNC: 9 G/DL (ref 13–16)
LYMPHOCYTES # BLD: 0.8 K/UL (ref 0.9–3.6)
LYMPHOCYTES NFR BLD: 4 % (ref 21–52)
MAGNESIUM SERPL-MCNC: 1.9 MG/DL (ref 1.6–2.6)
MCH RBC QN AUTO: 27.4 PG (ref 24–34)
MCHC RBC AUTO-ENTMCNC: 33.5 G/DL (ref 31–37)
MCV RBC AUTO: 82 FL (ref 74–97)
MONOCYTES # BLD: 1.1 K/UL (ref 0.05–1.2)
MONOCYTES NFR BLD: 6 % (ref 3–10)
NEUTS SEG # BLD: 17.3 K/UL (ref 1.8–8)
NEUTS SEG NFR BLD: 89 % (ref 40–73)
PHOSPHATE SERPL-MCNC: 1.2 MG/DL (ref 2.5–4.9)
PLATELET # BLD AUTO: 332 K/UL (ref 135–420)
PMV BLD AUTO: 9.3 FL (ref 9.2–11.8)
POTASSIUM SERPL-SCNC: 3.2 MMOL/L (ref 3.5–5.5)
RBC # BLD AUTO: 3.28 M/UL (ref 4.35–5.65)
SODIUM SERPL-SCNC: 134 MMOL/L (ref 136–145)
WBC # BLD AUTO: 19.4 K/UL (ref 4.6–13.2)

## 2021-08-20 PROCEDURE — 74011250637 HC RX REV CODE- 250/637: Performed by: SURGERY

## 2021-08-20 PROCEDURE — 80048 BASIC METABOLIC PNL TOTAL CA: CPT

## 2021-08-20 PROCEDURE — 74011000258 HC RX REV CODE- 258: Performed by: SURGERY

## 2021-08-20 PROCEDURE — 85025 COMPLETE CBC W/AUTO DIFF WBC: CPT

## 2021-08-20 PROCEDURE — 36415 COLL VENOUS BLD VENIPUNCTURE: CPT

## 2021-08-20 PROCEDURE — 74011250636 HC RX REV CODE- 250/636: Performed by: SURGERY

## 2021-08-20 PROCEDURE — 83735 ASSAY OF MAGNESIUM: CPT

## 2021-08-20 PROCEDURE — 65270000029 HC RM PRIVATE

## 2021-08-20 PROCEDURE — 74011000250 HC RX REV CODE- 250: Performed by: SURGERY

## 2021-08-20 PROCEDURE — 84100 ASSAY OF PHOSPHORUS: CPT

## 2021-08-20 RX ORDER — POTASSIUM CHLORIDE 7.45 MG/ML
10 INJECTION INTRAVENOUS
Status: DISPENSED | OUTPATIENT
Start: 2021-08-20 | End: 2021-08-20

## 2021-08-20 RX ORDER — POTASSIUM CHLORIDE 7.45 MG/ML
10 INJECTION INTRAVENOUS
Status: DISCONTINUED | OUTPATIENT
Start: 2021-08-20 | End: 2021-08-20

## 2021-08-20 RX ADMIN — ACETAMINOPHEN 1000 MG: 500 TABLET ORAL at 22:03

## 2021-08-20 RX ADMIN — POTASSIUM CHLORIDE 10 MEQ: 7.46 INJECTION, SOLUTION INTRAVENOUS at 20:03

## 2021-08-20 RX ADMIN — HEPARIN SODIUM 5000 UNITS: 5000 INJECTION INTRAVENOUS; SUBCUTANEOUS at 16:17

## 2021-08-20 RX ADMIN — AMLODIPINE BESYLATE 5 MG: 5 TABLET ORAL at 10:09

## 2021-08-20 RX ADMIN — GABAPENTIN 100 MG: 100 CAPSULE ORAL at 22:03

## 2021-08-20 RX ADMIN — METOCLOPRAMIDE HYDROCHLORIDE 5 MG: 5 INJECTION INTRAMUSCULAR; INTRAVENOUS at 07:02

## 2021-08-20 RX ADMIN — POTASSIUM CHLORIDE 10 MEQ: 7.46 INJECTION, SOLUTION INTRAVENOUS at 22:44

## 2021-08-20 RX ADMIN — METRONIDAZOLE 500 MG: 500 INJECTION, SOLUTION INTRAVENOUS at 07:02

## 2021-08-20 RX ADMIN — Medication 10 ML: at 07:11

## 2021-08-20 RX ADMIN — POTASSIUM CHLORIDE 10 MEQ: 7.46 INJECTION, SOLUTION INTRAVENOUS at 21:31

## 2021-08-20 RX ADMIN — SODIUM PHOSPHATE, MONOBASIC, MONOHYDRATE 30 MMOL: 276; 142 INJECTION, SOLUTION INTRAVENOUS at 22:02

## 2021-08-20 RX ADMIN — METRONIDAZOLE 500 MG: 500 INJECTION, SOLUTION INTRAVENOUS at 20:00

## 2021-08-20 RX ADMIN — POTASSIUM CHLORIDE 10 MEQ: 7.46 INJECTION, SOLUTION INTRAVENOUS at 18:49

## 2021-08-20 RX ADMIN — GABAPENTIN 100 MG: 100 CAPSULE ORAL at 16:17

## 2021-08-20 RX ADMIN — METOCLOPRAMIDE HYDROCHLORIDE 5 MG: 5 INJECTION INTRAMUSCULAR; INTRAVENOUS at 22:03

## 2021-08-20 RX ADMIN — GABAPENTIN 100 MG: 100 CAPSULE ORAL at 10:09

## 2021-08-20 RX ADMIN — HEPARIN SODIUM 5000 UNITS: 5000 INJECTION INTRAVENOUS; SUBCUTANEOUS at 00:07

## 2021-08-20 RX ADMIN — ACETAMINOPHEN 1000 MG: 500 TABLET ORAL at 10:09

## 2021-08-20 RX ADMIN — CEFTRIAXONE SODIUM 2 G: 2 INJECTION, POWDER, FOR SOLUTION INTRAMUSCULAR; INTRAVENOUS at 20:00

## 2021-08-20 RX ADMIN — ACETAMINOPHEN 1000 MG: 500 TABLET ORAL at 16:17

## 2021-08-20 RX ADMIN — METOCLOPRAMIDE HYDROCHLORIDE 5 MG: 5 INJECTION INTRAMUSCULAR; INTRAVENOUS at 14:48

## 2021-08-20 RX ADMIN — ATORVASTATIN CALCIUM 10 MG: 10 TABLET, FILM COATED ORAL at 10:09

## 2021-08-20 RX ADMIN — METOCLOPRAMIDE HYDROCHLORIDE 5 MG: 5 INJECTION INTRAMUSCULAR; INTRAVENOUS at 00:08

## 2021-08-20 RX ADMIN — HEPARIN SODIUM 5000 UNITS: 5000 INJECTION INTRAVENOUS; SUBCUTANEOUS at 10:08

## 2021-08-20 NOTE — ROUTINE PROCESS
Bedside and Verbal shift change report given to VIRGIL Parker RN (oncoming nurse) by VIRGIL Adan RN (offgoing nurse). Report included the following information SBAR, Kardex, Intake/Output, MAR and Recent Results.

## 2021-08-20 NOTE — PROGRESS NOTES
DC Plan: home with [facility] for wound care    Care manager rounded, met with patient and two visitors at bedside; patient pleasant and cooperative and without complaints; stated has been getting up to BR per self without difficulty; noted that Dr. Sriram Santiago would like to have [facility] for wound care; orders written and FOC offered to patient, will check later for choice. 1307: patient requests CM return when wife is visiting to select [facility]. Care Management Interventions  PCP Verified by CM:  Yes  Palliative Care Criteria Met (RRAT>21 & CHF Dx)?: No  Physical Therapy Consult: Yes  Occupational Therapy Consult: Yes  Current Support Network: Lives with Spouse  Confirm Follow Up Transport: Family

## 2021-08-20 NOTE — PROGRESS NOTES
2045 -  Head to toe assessment performed at this time. Pt denies any chest pain or SOB. Pt denies any numbness or tingling to extremities. Pt encouraged to manage pain and to use the incentive spirometer. Pt educated on the side effects of medications taken. Pt left with call light within reach and bed in low position. Will continue to monitor.

## 2021-08-20 NOTE — PROGRESS NOTES
Problem: Falls - Risk of  Goal: *Absence of Falls  Description: Document Meliton Moreau Fall Risk and appropriate interventions in the flowsheet.   Outcome: Progressing Towards Goal  Note: Fall Risk Interventions:  Mobility Interventions: Utilize walker, cane, or other assistive device, Patient to call before getting OOB    Mentation Interventions: Adequate sleep, hydration, pain control, Update white board, Increase mobility    Medication Interventions: Teach patient to arise slowly, Patient to call before getting OOB    Elimination Interventions: Call light in reach, Patient to call for help with toileting needs, Urinal in reach

## 2021-08-20 NOTE — PROGRESS NOTES
Pt stable  C/O nausea and stomach gurgling with lots of flatus  Wbc still higher today despite antibiotics. Discussed at length with nursing and pt with wife. Will monitor for trend and test for cdiff.

## 2021-08-21 ENCOUNTER — APPOINTMENT (OUTPATIENT)
Dept: CT IMAGING | Age: 66
DRG: 329 | End: 2021-08-21
Attending: SURGERY
Payer: COMMERCIAL

## 2021-08-21 PROBLEM — E87.6 HYPOKALEMIA: Status: ACTIVE | Noted: 2021-08-21

## 2021-08-21 PROBLEM — E83.42 HYPOMAGNESEMIA: Status: ACTIVE | Noted: 2021-08-21

## 2021-08-21 PROBLEM — R19.7 DIARRHEA: Status: ACTIVE | Noted: 2021-08-21

## 2021-08-21 PROBLEM — E83.39 HYPOPHOSPHATEMIA: Status: ACTIVE | Noted: 2021-08-21

## 2021-08-21 LAB
ANION GAP SERPL CALC-SCNC: 7 MMOL/L (ref 3–18)
BASOPHILS # BLD: 0 K/UL (ref 0–0.1)
BASOPHILS NFR BLD: 0 % (ref 0–2)
BUN SERPL-MCNC: 16 MG/DL (ref 7–18)
BUN/CREAT SERPL: 33 (ref 12–20)
CALCIUM SERPL-MCNC: 8 MG/DL (ref 8.5–10.1)
CHLORIDE SERPL-SCNC: 101 MMOL/L (ref 100–111)
CO2 SERPL-SCNC: 26 MMOL/L (ref 21–32)
CREAT SERPL-MCNC: 0.48 MG/DL (ref 0.6–1.3)
DIFFERENTIAL METHOD BLD: ABNORMAL
EOSINOPHIL # BLD: 0.1 K/UL (ref 0–0.4)
EOSINOPHIL NFR BLD: 1 % (ref 0–5)
ERYTHROCYTE [DISTWIDTH] IN BLOOD BY AUTOMATED COUNT: 16.4 % (ref 11.6–14.5)
GLUCOSE SERPL-MCNC: 124 MG/DL (ref 74–99)
HCT VFR BLD AUTO: 22.9 % (ref 36–48)
HGB BLD-MCNC: 7.8 G/DL (ref 13–16)
LYMPHOCYTES # BLD: 1 K/UL (ref 0.9–3.6)
LYMPHOCYTES NFR BLD: 6 % (ref 21–52)
MAGNESIUM SERPL-MCNC: 2 MG/DL (ref 1.6–2.6)
MCH RBC QN AUTO: 26.8 PG (ref 24–34)
MCHC RBC AUTO-ENTMCNC: 34.1 G/DL (ref 31–37)
MCV RBC AUTO: 78.7 FL (ref 74–97)
MONOCYTES # BLD: 1.4 K/UL (ref 0.05–1.2)
MONOCYTES NFR BLD: 9 % (ref 3–10)
NEUTS SEG # BLD: 12.2 K/UL (ref 1.8–8)
NEUTS SEG NFR BLD: 83 % (ref 40–73)
PHOSPHATE SERPL-MCNC: 2.8 MG/DL (ref 2.5–4.9)
PLATELET # BLD AUTO: 342 K/UL (ref 135–420)
PMV BLD AUTO: 9.8 FL (ref 9.2–11.8)
POTASSIUM SERPL-SCNC: 3.4 MMOL/L (ref 3.5–5.5)
RBC # BLD AUTO: 2.91 M/UL (ref 4.35–5.65)
SODIUM SERPL-SCNC: 134 MMOL/L (ref 136–145)
WBC # BLD AUTO: 14.8 K/UL (ref 4.6–13.2)

## 2021-08-21 PROCEDURE — 74011000258 HC RX REV CODE- 258: Performed by: HOSPITALIST

## 2021-08-21 PROCEDURE — 74011250636 HC RX REV CODE- 250/636: Performed by: HOSPITALIST

## 2021-08-21 PROCEDURE — 36415 COLL VENOUS BLD VENIPUNCTURE: CPT

## 2021-08-21 PROCEDURE — 83735 ASSAY OF MAGNESIUM: CPT

## 2021-08-21 PROCEDURE — 80048 BASIC METABOLIC PNL TOTAL CA: CPT

## 2021-08-21 PROCEDURE — 74011250637 HC RX REV CODE- 250/637: Performed by: HOSPITALIST

## 2021-08-21 PROCEDURE — 74011250636 HC RX REV CODE- 250/636: Performed by: SURGERY

## 2021-08-21 PROCEDURE — 84100 ASSAY OF PHOSPHORUS: CPT

## 2021-08-21 PROCEDURE — 85025 COMPLETE CBC W/AUTO DIFF WBC: CPT

## 2021-08-21 PROCEDURE — 65270000029 HC RM PRIVATE

## 2021-08-21 PROCEDURE — 87324 CLOSTRIDIUM AG IA: CPT

## 2021-08-21 PROCEDURE — 74177 CT ABD & PELVIS W/CONTRAST: CPT

## 2021-08-21 PROCEDURE — 74011000636 HC RX REV CODE- 636: Performed by: SURGERY

## 2021-08-21 PROCEDURE — 74011250637 HC RX REV CODE- 250/637: Performed by: SURGERY

## 2021-08-21 PROCEDURE — 87449 NOS EACH ORGANISM AG IA: CPT

## 2021-08-21 RX ORDER — SAME BUTANEDISULFONATE/BETAINE 400-600 MG
500 POWDER IN PACKET (EA) ORAL 2 TIMES DAILY
Status: DISCONTINUED | OUTPATIENT
Start: 2021-08-21 | End: 2021-08-27 | Stop reason: HOSPADM

## 2021-08-21 RX ADMIN — Medication 10 ML: at 14:00

## 2021-08-21 RX ADMIN — ACETAMINOPHEN 1000 MG: 500 TABLET ORAL at 22:02

## 2021-08-21 RX ADMIN — Medication 500 MG: at 18:35

## 2021-08-21 RX ADMIN — METOCLOPRAMIDE HYDROCHLORIDE 5 MG: 5 INJECTION INTRAMUSCULAR; INTRAVENOUS at 15:19

## 2021-08-21 RX ADMIN — Medication 10 ML: at 22:04

## 2021-08-21 RX ADMIN — HEPARIN SODIUM 5000 UNITS: 5000 INJECTION INTRAVENOUS; SUBCUTANEOUS at 09:41

## 2021-08-21 RX ADMIN — AMLODIPINE BESYLATE 5 MG: 5 TABLET ORAL at 09:42

## 2021-08-21 RX ADMIN — DIATRIZOATE MEGLUMINE AND DIATRIZOATE SODIUM 30 ML: 660; 100 LIQUID ORAL; RECTAL at 02:43

## 2021-08-21 RX ADMIN — METRONIDAZOLE 500 MG: 500 INJECTION, SOLUTION INTRAVENOUS at 09:42

## 2021-08-21 RX ADMIN — ACETAMINOPHEN 1000 MG: 500 TABLET ORAL at 09:42

## 2021-08-21 RX ADMIN — METOCLOPRAMIDE HYDROCHLORIDE 5 MG: 5 INJECTION INTRAMUSCULAR; INTRAVENOUS at 02:43

## 2021-08-21 RX ADMIN — HEPARIN SODIUM 5000 UNITS: 5000 INJECTION INTRAVENOUS; SUBCUTANEOUS at 01:23

## 2021-08-21 RX ADMIN — METOCLOPRAMIDE HYDROCHLORIDE 5 MG: 5 INJECTION INTRAMUSCULAR; INTRAVENOUS at 09:41

## 2021-08-21 RX ADMIN — METOCLOPRAMIDE HYDROCHLORIDE 5 MG: 5 INJECTION INTRAMUSCULAR; INTRAVENOUS at 22:02

## 2021-08-21 RX ADMIN — GABAPENTIN 100 MG: 100 CAPSULE ORAL at 09:42

## 2021-08-21 RX ADMIN — IOPAMIDOL 100 ML: 612 INJECTION, SOLUTION INTRAVENOUS at 08:21

## 2021-08-21 RX ADMIN — GABAPENTIN 100 MG: 100 CAPSULE ORAL at 22:02

## 2021-08-21 RX ADMIN — ATORVASTATIN CALCIUM 10 MG: 10 TABLET, FILM COATED ORAL at 09:42

## 2021-08-21 RX ADMIN — ACETAMINOPHEN 1000 MG: 500 TABLET ORAL at 15:19

## 2021-08-21 RX ADMIN — PIPERACILLIN AND TAZOBACTAM 4.5 G: 4; .5 INJECTION, POWDER, LYOPHILIZED, FOR SOLUTION INTRAVENOUS; PARENTERAL at 18:35

## 2021-08-21 RX ADMIN — GABAPENTIN 100 MG: 100 CAPSULE ORAL at 15:19

## 2021-08-21 NOTE — CONSULTS
Medicine Consult    Patient:  Michael Carrillo 61 y.o. male  Asked to evaluate patient by Dr. Yoon Jerome  Primary Care Provider:  Trudy Martínez MD  Date of Admission:  8/16/2021  Reason for Consult: elevated WBC, hypokalemia, hypomagnesemia, diarrhea        Assessment/Plan     Patient Active Problem List   Diagnosis Code    Abscess of sigmoid colon due to diverticulitis K57.20    Diverticulitis of large intestine with abscess K57.20    Diarrhea R19.7    Hypokalemia E87.6    Hypomagnesemia E83.42    Hypophosphatemia E83.39       PLAN:      -Monitor hemodynamics and hemoglobin in and postoperative. Monitor for   anemia, Hb trending down, no evidence of GI or other bleed, monitor H/H.  -Monitor kidney function. Avoid nephrotoxins. Gentle hydration.  -Continue blood pressure medications. Monitor for hypotension. If that is  to present, we may need to hold some or all of her blood pressure  medications.  -Perforated diverticulosis with abscess, s/p low anterior resection, mobilization of splenic flexure, omental flap, abscess drainage. on ceftriaxone and Flagyl. Cultures showed growth of light E. coli, Enterococcus faecium, Pseudomonas aeruginosa. Patient is allergic to ciprofloxacin. We will switch antibiotics to Zosyn given Pseudomonas. White count improving.  -Hypokalemia, hypomagnesemia, hypophosphatemia: Check and replace electrolytes. Likely secondary to diarrhea. -Diarrhea: C. difficile negative, likely antibiotic associated. Started on [employer]. We will recheck C. difficile.  -Continue lipid lowering therapy.  -Routine postoperative management, pain control, and deep venous  thrombosis per admitting team.    Thank you for allowing us to participate in this patients care. HPI:   CC:  Michael Carrillo is a 61 y.o. male with past medical history significant for hypercholesterolemia is admitted by general surgery. He had perforated diverticulum with abscess.   He was admitted to this hospital in mid July. He had drain placed for abscess drainage on July 16, 2021. Follow-up CT scan with worsening of abscess. He was admitted at this time and underwent low anterior resection, mobilization of splenic flexure, omental flap, abscess drainage, cystoscopy with ureteral stent placement. He was started on antibiotics. His white count elevated. Hemoglobin trending down and he also has hypokalemia, hypomagnesemia and hypophosphatemia. Medicine consulted for further management. Past Medical History:   Diagnosis Date    High cholesterol      Past Surgical History:   Procedure Laterality Date    HX APPENDECTOMY  1968    IR INJ ABS CYST VIA CATHETER / TUBE  7/26/2021      Social History     Tobacco Use    Smoking status: Never Smoker    Smokeless tobacco: Never Used   Vaping Use    Vaping Use: Never used   Substance Use Topics    Alcohol use: Yes     Alcohol/week: 5.0 standard drinks     Types: 2 Glasses of wine, 3 Shots of liquor per week    Drug use: Never     History reviewed. No pertinent family history. No current facility-administered medications on file prior to encounter. Current Outpatient Medications on File Prior to Encounter   Medication Sig Dispense Refill    atorvastatin (LIPITOR) 10 mg tablet Take 10 mg by mouth daily. Indications: excessive fat in the blood      amLODIPine (NORVASC) 5 mg tablet Take 1 Tablet by mouth daily.  (Patient not taking: Reported on 8/12/2021) 30 Tablet 0      Allergies   Allergen Reactions    Ciprofloxacin Hives           Review of Systems  Constitutional: No fever, chills, diaphoresis, malaise, fatigue or weight gain/loss or falls  Skin: no itching or rashes  HEENT: no ear discomfort, hearing loss, tinnitus, epistaxis or sore throat  EYES: no blurry vision, double vision or photophobia  CARDIOVASCULAR: no claudication, cp, palpitations, orthopnea, pnd or LE edema  PULMONARY: no cough, wheeze, shortness of breath or sputum production  GI: see above  : no dysuria, hematuria  MUSCULOSKELETAL: no back pain, joint pain or myalgias  ENDOCRINE: no heat/cold intolerance, polyuria or polydipsia  HEME: no easy bruising or bleeding  NEURO: no unilateral weakness, numbness, tingling or seizures      Physical Exam:      Visit Vitals  BP (!) 162/67 (BP 1 Location: Right lower arm, BP Patient Position: At rest)   Pulse 91   Temp 98.1 °F (36.7 °C)   Resp 17   Ht 5' 11.5\" (1.816 m)   Wt 98 kg (216 lb)   SpO2 96%   BMI 29.71 kg/m²     Body mass index is 29.71 kg/m². Physical Exam:  GEN: well nourished, laying in bed in no acute distress  HEENT: atraumatic, nose normal,oropharynx clear, MMM  NECK: supple, trachea midline, no supraclavicular or submandibular adenopathy noted  EYES: conjuctiva normal, lids with out lesions, PERRL  HEART: RRR with out m/r/g, pmi nondisplaced, pulses 2+ distally  LUNGS: equal chest wall expansion, cta bl with out wheezes/rales or rhonchi  AB: soft, +BS,post op abdomen. NEURO: alert, awake and oriented x3, gait not assessed, cranial nerves intact, strength 5/5 bl UE and LE, sensation intact, reflexes nonpathological  SKIN: dry, intact, warm no breakdown noted        Laboratory Studies:    Recent Results (from the past 24 hour(s))   CBC WITH AUTOMATED DIFF    Collection Time: 08/21/21  5:16 AM   Result Value Ref Range    WBC 14.8 (H) 4.6 - 13.2 K/uL    RBC 2.91 (L) 4.35 - 5.65 M/uL    HGB 7.8 (L) 13.0 - 16.0 g/dL    HCT 22.9 (L) 36.0 - 48.0 %    MCV 78.7 74.0 - 97.0 FL    MCH 26.8 24.0 - 34.0 PG    MCHC 34.1 31.0 - 37.0 g/dL    RDW 16.4 (H) 11.6 - 14.5 %    PLATELET 649 478 - 404 K/uL    MPV 9.8 9.2 - 11.8 FL    NEUTROPHILS 83 (H) 40 - 73 %    LYMPHOCYTES 6 (L) 21 - 52 %    MONOCYTES 9 3 - 10 %    EOSINOPHILS 1 0 - 5 %    BASOPHILS 0 0 - 2 %    ABS. NEUTROPHILS 12.2 (H) 1.8 - 8.0 K/UL    ABS. LYMPHOCYTES 1.0 0.9 - 3.6 K/UL    ABS. MONOCYTES 1.4 (H) 0.05 - 1.2 K/UL    ABS. EOSINOPHILS 0.1 0.0 - 0.4 K/UL    ABS.  BASOPHILS 0.0 0.0 - 0.1 K/UL DF AUTOMATED     MAGNESIUM    Collection Time: 08/21/21  5:16 AM   Result Value Ref Range    Magnesium 2.0 1.6 - 2.6 mg/dL   METABOLIC PANEL, BASIC    Collection Time: 08/21/21  5:16 AM   Result Value Ref Range    Sodium 134 (L) 136 - 145 mmol/L    Potassium 3.4 (L) 3.5 - 5.5 mmol/L    Chloride 101 100 - 111 mmol/L    CO2 26 21 - 32 mmol/L    Anion gap 7 3.0 - 18 mmol/L    Glucose 124 (H) 74 - 99 mg/dL    BUN 16 7.0 - 18 MG/DL    Creatinine 0.48 (L) 0.6 - 1.3 MG/DL    BUN/Creatinine ratio 33 (H) 12 - 20      GFR est AA >60 >60 ml/min/1.73m2    GFR est non-AA >60 >60 ml/min/1.73m2    Calcium 8.0 (L) 8.5 - 10.1 MG/DL   PHOSPHORUS    Collection Time: 08/21/21  5:16 AM   Result Value Ref Range    Phosphorus 2.8 2.5 - 4.9 MG/DL

## 2021-08-21 NOTE — PROGRESS NOTES
1900 - Bedside report received from Chanell Jones RN. Patient in bed at this time. Plan of care for the day addressed with the patient. 1009 - Patient in bed resting. IV intact. 4 trocar sites and midline dressing CDI. KEANU drain with scant serous drainage to bulb and small drainage to insertion site. Patient reports Freq BM and flatulence. Abd distended, improved from previous nurse report. 1147 - Lab orders placed per Dr. Nina Reno, patient able to switch to regular diet per patient request. Informed of drain output and drainage to site. 1400 - Patient vomited shortly following eating lunch. 1448 - Scheduled reglan administered. 1552 - Potassium chloride ordered per written protocol at this time. 1900 - Bedside and Verbal shift change report given to JULEE Lane by Abdulaziz Alford RN. Report included the following information SBAR, Kardex, OR Summary, Intake/Output and MAR. Oncoming nurse to change dressing. 5 - Spoke with Dr. Nina Reno, informed of patients current status and abn labs. Orders given for Sodium phosphate and change diet back to NPO with sips of water. Primary nurse made aware.

## 2021-08-21 NOTE — PROGRESS NOTES
19:55 Assessment completed. Lungs are clear bilat but are decreased in the bases. IS = 2000. Optifoam dsg on abd remains C/D/I with serosanguinous per KEANU. Resting quietly in bed with family @ bedside. Voiding per BR w/o difficulty. 1 liquid black stool, stool sample sent for C-diff. 22:45 Shift assessment completed. See nsg flow sheet for details. 03:15 Reassessed with 0 changes noted. Abd remains soft & tender with + BS. Optifoam dsg remains C/D/I with serosanguinous drainage. Resting quietly in bed & is now refusing to use IS & to ambulate despite encouraging. 07:10 Bedside and Verbal shift change report given to S May RN (oncoming nurse) by Chantale Robles RN (offgoing nurse). Report included the following information SBAR.

## 2021-08-21 NOTE — ROUTINE PROCESS
Bedside and Verbal shift change report given to ABNER Tay RN (oncoming nurse) by VIRGIL Payton RN (offgoing nurse). Report included the following information SBAR, Kardex, Intake/Output, MAR and Recent Results.

## 2021-08-21 NOTE — PROGRESS NOTES
Bedside and verbal shift [facility] given to Ivone Tay RN (oncoming nurse). Report included the following information SBAR, Kardex, Intake/Output, MAR and Recent Results     0703- Call placed to CT for procedure, no answer    0950- Call placed to CT regarding ordered procedure spoke with Tay, advised bring him down in 30 min. 1438- Patient resting abed quietly with visitors at bedside with bilateral eyes closed. Resp even and non-labored. No s/s resp distress noted. No s/s pain or discomfort. Call light within reach. Will cont to monitor. 1806- Patient ambulated in room from bed to bedside chair x1. Patient able to tolerate sitting in bedside chair 1st time for 25 mins. Patient refused to sit in chair again, patient sit on side of bed upright for 15 mins. Per this nurse strongly encouraged patient and educated patient and family on importance of ambulation. Bedside and verbal shift change report given to Surjit Johnson (oncoming nurse) by Cierra Reinoso RN (offgoing nurse).  Report included the following information SBAR, Kardex, Intake/Output, MAR and Recent Results

## 2021-08-21 NOTE — PROGRESS NOTES
Pt was clinically improved today with no nausea and multiple loose BMs. Abd less distended. In afternoon, pt vomited his lunch. WBC continues to increase. Electrolyte dysfunction. Plan replace electrolytes. Continue antibiotics. Will likely require CT in am for investigation of abscess.

## 2021-08-21 NOTE — PROGRESS NOTES
Problem: Falls - Risk of  Goal: *Absence of Falls  Description: Document Kaity Mcclellan Fall Risk and appropriate interventions in the flowsheet.   Outcome: Progressing Towards Goal  Note: Fall Risk Interventions:  Mobility Interventions: Communicate number of staff needed for ambulation/transfer    Mentation Interventions: Adequate sleep, hydration, pain control, Update white board, Increase mobility    Medication Interventions: Teach patient to arise slowly, Patient to call before getting OOB    Elimination Interventions: Call light in reach

## 2021-08-21 NOTE — PROGRESS NOTES
2005 - Head to toe assessment completed at this time. Pt denies any chest pain or SOB. Pt denies any numbness or tingling to extremities. Pt denies any pain at this time. Pt dressing is CDI. Pt is AOx4. Pt encouraged to call for assistance and to manage pain. Pt left with call light within reach, bed in the low position, and wheels locked. Will continue to monitor. 1 - Dr. Sincere Devi stated that she would like the pt to have the oral contrast and have the CT performed 2 hours past the set protocol. 2236 - CT informed of Dr. Curt Mejia request to have CT performed 2 hours later than routine protocol. 2245 - Pt informed that he was NPO except for sips of water. He verbalized understanding. 1756 - Pt given oral contrast at this time and informed of the time to expect the CT to be completed. 5508- CT was not able to take pt at this time. 0530 - CT was not able to take pt yet. 0630 - Called CT to bring pt down and was asked to please hold the pt and they would call back. Shift summary - CT was not able to be completed during my shift. Oncoming nurse is aware.  Dr. Sincere Devi notified

## 2021-08-22 LAB
ANION GAP SERPL CALC-SCNC: 8 MMOL/L (ref 3–18)
BASOPHILS # BLD: 0 K/UL (ref 0–0.1)
BASOPHILS NFR BLD: 0 % (ref 0–2)
BUN SERPL-MCNC: 16 MG/DL (ref 7–18)
BUN/CREAT SERPL: 36 (ref 12–20)
C DIFF GDH STL QL: NEGATIVE
C DIFF TOX A+B STL QL IA: NEGATIVE
CALCIUM SERPL-MCNC: 7.9 MG/DL (ref 8.5–10.1)
CHLORIDE SERPL-SCNC: 102 MMOL/L (ref 100–111)
CO2 SERPL-SCNC: 27 MMOL/L (ref 21–32)
CREAT SERPL-MCNC: 0.45 MG/DL (ref 0.6–1.3)
DIFFERENTIAL METHOD BLD: ABNORMAL
EOSINOPHIL # BLD: 0.1 K/UL (ref 0–0.4)
EOSINOPHIL NFR BLD: 1 % (ref 0–5)
ERYTHROCYTE [DISTWIDTH] IN BLOOD BY AUTOMATED COUNT: 16.6 % (ref 11.6–14.5)
GLUCOSE SERPL-MCNC: 103 MG/DL (ref 74–99)
HCT VFR BLD AUTO: 21.8 % (ref 36–48)
HGB BLD-MCNC: 7.2 G/DL (ref 13–16)
INTERPRETATION: NORMAL
LYMPHOCYTES # BLD: 1.1 K/UL (ref 0.9–3.6)
LYMPHOCYTES NFR BLD: 7 % (ref 21–52)
MAGNESIUM SERPL-MCNC: 2.2 MG/DL (ref 1.6–2.6)
MCH RBC QN AUTO: 26.8 PG (ref 24–34)
MCHC RBC AUTO-ENTMCNC: 33 G/DL (ref 31–37)
MCV RBC AUTO: 81 FL (ref 74–97)
MONOCYTES # BLD: 1.4 K/UL (ref 0.05–1.2)
MONOCYTES NFR BLD: 9 % (ref 3–10)
NEUTS SEG # BLD: 12.8 K/UL (ref 1.8–8)
NEUTS SEG NFR BLD: 82 % (ref 40–73)
PHOSPHATE SERPL-MCNC: 3.4 MG/DL (ref 2.5–4.9)
PLATELET # BLD AUTO: 371 K/UL (ref 135–420)
PMV BLD AUTO: 9.9 FL (ref 9.2–11.8)
POTASSIUM SERPL-SCNC: 3.6 MMOL/L (ref 3.5–5.5)
RBC # BLD AUTO: 2.69 M/UL (ref 4.35–5.65)
SODIUM SERPL-SCNC: 137 MMOL/L (ref 136–145)
WBC # BLD AUTO: 15.6 K/UL (ref 4.6–13.2)

## 2021-08-22 PROCEDURE — 83735 ASSAY OF MAGNESIUM: CPT

## 2021-08-22 PROCEDURE — 74011000250 HC RX REV CODE- 250: Performed by: SURGERY

## 2021-08-22 PROCEDURE — C9113 INJ PANTOPRAZOLE SODIUM, VIA: HCPCS | Performed by: SURGERY

## 2021-08-22 PROCEDURE — 80048 BASIC METABOLIC PNL TOTAL CA: CPT

## 2021-08-22 PROCEDURE — 74011250637 HC RX REV CODE- 250/637: Performed by: HOSPITALIST

## 2021-08-22 PROCEDURE — 74011250637 HC RX REV CODE- 250/637: Performed by: SURGERY

## 2021-08-22 PROCEDURE — 65270000029 HC RM PRIVATE

## 2021-08-22 PROCEDURE — 85025 COMPLETE CBC W/AUTO DIFF WBC: CPT

## 2021-08-22 PROCEDURE — 74011000258 HC RX REV CODE- 258: Performed by: HOSPITALIST

## 2021-08-22 PROCEDURE — 97161 PT EVAL LOW COMPLEX 20 MIN: CPT

## 2021-08-22 PROCEDURE — 74011250636 HC RX REV CODE- 250/636: Performed by: HOSPITALIST

## 2021-08-22 PROCEDURE — 84100 ASSAY OF PHOSPHORUS: CPT

## 2021-08-22 PROCEDURE — 36415 COLL VENOUS BLD VENIPUNCTURE: CPT

## 2021-08-22 PROCEDURE — 74011250636 HC RX REV CODE- 250/636: Performed by: SURGERY

## 2021-08-22 RX ADMIN — ACETAMINOPHEN 1000 MG: 500 TABLET ORAL at 21:03

## 2021-08-22 RX ADMIN — METOCLOPRAMIDE HYDROCHLORIDE 5 MG: 5 INJECTION INTRAMUSCULAR; INTRAVENOUS at 09:32

## 2021-08-22 RX ADMIN — PIPERACILLIN AND TAZOBACTAM 4.5 G: 4; .5 INJECTION, POWDER, LYOPHILIZED, FOR SOLUTION INTRAVENOUS; PARENTERAL at 12:32

## 2021-08-22 RX ADMIN — METOCLOPRAMIDE HYDROCHLORIDE 5 MG: 5 INJECTION INTRAMUSCULAR; INTRAVENOUS at 02:53

## 2021-08-22 RX ADMIN — ACETAMINOPHEN 1000 MG: 500 TABLET ORAL at 15:08

## 2021-08-22 RX ADMIN — PIPERACILLIN AND TAZOBACTAM 4.5 G: 4; .5 INJECTION, POWDER, LYOPHILIZED, FOR SOLUTION INTRAVENOUS; PARENTERAL at 07:00

## 2021-08-22 RX ADMIN — ATORVASTATIN CALCIUM 10 MG: 10 TABLET, FILM COATED ORAL at 09:32

## 2021-08-22 RX ADMIN — Medication 500 MG: at 21:03

## 2021-08-22 RX ADMIN — METOCLOPRAMIDE HYDROCHLORIDE 5 MG: 5 INJECTION INTRAMUSCULAR; INTRAVENOUS at 21:03

## 2021-08-22 RX ADMIN — GABAPENTIN 100 MG: 100 CAPSULE ORAL at 09:32

## 2021-08-22 RX ADMIN — ACETAMINOPHEN 1000 MG: 500 TABLET ORAL at 09:32

## 2021-08-22 RX ADMIN — GABAPENTIN 100 MG: 100 CAPSULE ORAL at 21:03

## 2021-08-22 RX ADMIN — Medication 10 ML: at 15:09

## 2021-08-22 RX ADMIN — GABAPENTIN 100 MG: 100 CAPSULE ORAL at 15:08

## 2021-08-22 RX ADMIN — AMLODIPINE BESYLATE 5 MG: 5 TABLET ORAL at 09:32

## 2021-08-22 RX ADMIN — SODIUM CHLORIDE 40 MG: 9 INJECTION INTRAMUSCULAR; INTRAVENOUS; SUBCUTANEOUS at 09:31

## 2021-08-22 RX ADMIN — PIPERACILLIN AND TAZOBACTAM 4.5 G: 4; .5 INJECTION, POWDER, LYOPHILIZED, FOR SOLUTION INTRAVENOUS; PARENTERAL at 00:04

## 2021-08-22 RX ADMIN — PIPERACILLIN AND TAZOBACTAM 4.5 G: 4; .5 INJECTION, POWDER, LYOPHILIZED, FOR SOLUTION INTRAVENOUS; PARENTERAL at 18:16

## 2021-08-22 RX ADMIN — Medication 10 ML: at 05:38

## 2021-08-22 RX ADMIN — METOCLOPRAMIDE HYDROCHLORIDE 5 MG: 5 INJECTION INTRAMUSCULAR; INTRAVENOUS at 15:09

## 2021-08-22 RX ADMIN — Medication 500 MG: at 09:32

## 2021-08-22 NOTE — PROGRESS NOTES
Bedside and verbal shift change report recieved from [address] [facility]  (offgoing nurse) given to Steffen Tay RN (oncoming nurse). Report included the following information SBAR, Kardex, Intake/Output, MAR and Recent Results       Bedside and verbal shift change report given to Joe Reese (oncoming nurse) by Steffen Tay RN (offgoing nurse).  Report included the following information SBAR, Kardex, Intake/Output, MAR and Recent Results

## 2021-08-22 NOTE — PROGRESS NOTES
Problem: Mobility Impaired (Adult and Pediatric)  Goal: *Acute Goals and Plan of Care (Insert Text)  Description: Physical Therapy short term goals initiated 08/22/21, to be achieved in 3-7 days. Pt will:   1. Perform bed mobility with S in prep for OOB activity. 2. Perform sit <> stand transfers with LRAD and S in prep for functional mobility and ambulation. 3. Ambulate 100 ft with LRAD and S in prep for household and community mobility. 4. Ascend/descend >2 stairs with 1 HR and S for safe home entry. Note:     PHYSICAL THERAPY EVALUATION    Patient: Khadra Ocampo (89 y.o. male)  Date: 8/22/2021  Primary Diagnosis: Diverticulitis of large intestine with abscess [K57.20]  Procedure(s) (LRB):  ROBOTIC ASSIST LOW ANTERIOR RESECTION, MOBILIZATION OF SPLENIC FLEXURE, OMENTAL FLAP, ABSCESS DRAINAGE, CYSTOSCOPY WITH URETERAL STENT PLACEMENT (ERAS PROTOCOL) (N/A) 6 Days Post-Op   Precautions: Fall     PLOF: Pt was independent with household and community mobility with no AD PTA. ASSESSMENT :  Based on the objective data described below, the patient presents with decr activity tolerance, abdominal pain, and decr standing balance resulting in deficits in bed mobility, transfers, and gait. Pt supine in bed, very pleasant. Pt reports he has been amb in room with staff with no AD. Pt agreeable to attempt gait training in room. Pt amb in room with no AD, CGA, no LOB, though slightly incr trunk sway noted, decr speed. Pt returned to sitting EOB, instructed in seated therapeutic exercise for LE strength and ROM as described below. Pt educated on negative effects of bedrest and importance of OOB mobility. Pt encouraged to sit in chair 2-3 times/day for at least 30-60 minutes, and amb with staff 2-3 times/day. Pt left sitting EOB with all needs met and all questions answered. Anticipate pt will need 1-2 additional sessions to promote incr gait safety and tolerance and become indep with HEP.      Patient will benefit from skilled intervention to address the above impairments. Patient's rehabilitation potential is considered to be Fair  Factors which may influence rehabilitation potential include:   []         None noted  []         Mental ability/status  [x]         Medical condition  []         Home/family situation and support systems  []         Safety awareness  []         Pain tolerance/management  [x]         Other: pt motivation     PLAN :  Recommendations and Planned Interventions:   [x]           Bed Mobility Training             []    Neuromuscular Re-Education  [x]           Transfer Training                   []    Orthotic/Prosthetic Training  [x]           Gait Training                          []    Modalities  [x]           Therapeutic Exercises           []    Edema Management/Control  [x]           Therapeutic Activities            [x]    Family Training/Education  [x]           Patient Education  []           Other (comment):    Frequency/Duration: Patient will be followed by physical therapy 1-2 times per day/4-7 days per week to address goals. Discharge Recommendations: Home Health v none  Further Equipment Recommendations for Discharge: N/A     SUBJECTIVE:   Patient stated I feel okay.     OBJECTIVE DATA SUMMARY:     Past Medical History:   Diagnosis Date    High cholesterol      Past Surgical History:   Procedure Laterality Date    HX APPENDECTOMY  1968    IR INJ ABS CYST VIA CATHETER / TUBE  7/26/2021     Barriers to Learning/Limitations: None  Compensate with: N/A  Home Situation:  Home Situation  Home Environment: Private residence  # Steps to Enter: 3  Rails to Enter: Yes  Hand Rails : Right  One/Two Story Residence: Two story  # of Interior Steps: 11  Interior Rails: Right  Living Alone: No  Support Systems: Spouse/Significant Other/Partner  Patient Expects to be Discharged to[de-identified] [employer]  Current DME Used/Available at Home: None  Tub or Shower Type: Shower  Critical Behavior:  Neurologic State: Alert  Orientation Level: Oriented X4  Cognition: Appropriate decision making; Appropriate for age attention/concentration; Appropriate safety awareness; Follows commands  Psychosocial  Patient Behaviors: Calm; Cooperative  Purposeful Interaction: Yes  Pt Identified Daily Priority: Clinical issues (comment)  Caritas Process: Nurture loving kindness;Establish trust;Teaching/learning; Attend basic human needs;Create healing environment  Caring Interventions: Reassure; Therapeutic modalities  Reassure: Therapeutic listening; Informing;Caring rounds  Therapeutic Modalities: Intentional therapeutic touch  Skin Condition/Temp: Dry;Warm  Skin Integrity: Incision (comment)  Skin Integumentary  Skin Color: Appropriate for ethnicity  Skin Condition/Temp: Dry;Warm  Skin Integrity: Incision (comment)  Strength:    Strength: Generally decreased, functional  Tone & Sensation:   Tone: Normal  Sensation: Intact  Range Of Motion:  AROM: Generally decreased, functional  Functional Mobility:  Bed Mobility:  Supine to Sit: Stand-by assistance  Scooting: Supervision  Transfers:  Sit to Stand: Contact guard assistance;Stand-by assistance  Stand to Sit: Stand-by assistance  Balance:   Sitting: Intact  Standing: Impaired  Standing - Static: Good  Standing - Dynamic : Fair  Ambulation/Gait Training:  Distance (ft): 60 Feet (ft)  Assistive Device: Gait belt  Ambulation - Level of Assistance: Contact guard assistance;Stand-by assistance  Gait Abnormalities: Decreased step clearance  Speed/Jaleesa: Slow  Step Length: Left shortened;Right shortened  Interventions: Safety awareness training; Tactile cues; Verbal cues  Therapeutic Exercises:   Sitting EOB pt performed seated marches x15 reps B, LAQ x15 reps B, and ankle pumps x10 reps B. Pt encouraged to perform to tolerance t/o the day. Pain:  Pain level pre-treatment: 2/10   Pain level post-treatment: 2/10   Pain Intervention(s) : Medication (see MAR);  Rest, Ice, Repositioning  Response to intervention: Nurse notified, See doc flow  Activity Tolerance:   Pt tolerated amb in room with no AD, no incr in abdominal pain, no LOB. Please refer to the flowsheet for vital signs taken during this treatment. After treatment:   []         Patient left in no apparent distress sitting up in chair  [x]         Patient left in no apparent distress sitting at edge of bed  [x]         Call bell left within reach  [x]         Nursing notified  []         Caregiver present  []         Bed alarm activated  []         SCDs applied    COMMUNICATION/EDUCATION:   [x]         Role of Physical Therapy in the acute care setting. [x]         Fall prevention education was provided and the patient/caregiver indicated understanding. [x]         Patient/family have participated as able in goal setting and plan of care. [x]         Patient/family agree to work toward stated goals and plan of care. []         Patient understands intent and goals of therapy, but is neutral about his/her participation. []         Patient is unable to participate in goal setting/plan of care: ongoing with therapy staff.  []         Other:     Thank you for this referral.  Shara Bains   Time Calculation: 13 mins      Eval Complexity: History: LOW Complexity : Zero comorbidities / personal factors that will impact the outcome / POCExam:LOW Complexity : 1-2 Standardized tests and measures addressing body structure, function, activity limitation and / or participation in recreation  Presentation: LOW Complexity : Stable, uncomplicated  Clinical Decision Making:Low Complexity    Overall Complexity:LOW

## 2021-08-22 NOTE — PROGRESS NOTES
Problem: Falls - Risk of  Goal: *Absence of Falls  Description: Document Keith Chang Fall Risk and appropriate interventions in the flowsheet.   Outcome: Progressing Towards Goal  Note: Fall Risk Interventions:  Mobility Interventions: Communicate number of staff needed for ambulation/transfer    Mentation Interventions: Adequate sleep, hydration, pain control, Evaluate medications/consider consulting pharmacy, Increase mobility    Medication Interventions: Evaluate medications/consider consulting pharmacy, Patient to call before getting OOB, Teach patient to arise slowly    Elimination Interventions: Call light in reach, Patient to call for help with toileting needs

## 2021-08-22 NOTE — PROGRESS NOTES
Some  improvement overnight. CT images viewed, report reviewed and also discussed with radiologist.  No findings to explain pts labs and clinical course. No evidence of ischemia, leak, colitis, or organized abscess. WBC increased, anemia stable  Stopped heparin and started protonix. No clear evidence of bleed to explain anemia. KEANU with SS minimal. Abd benign. Consult to Medicine to review electrolyte derangement and anemia with leukocytosis. Appreciate recommendations and changes by Dr. Ellie Lyn. Reconsulted PT as pt is not getting out of bed at all. IS encouraged. Discussed course and plan at length with wife, son and aunt? (who is nurse). All questions answered. Restarted diet. If not tolerated will start TPN. Pt came in with very bad perforation and was known to likely have a difficulty post operative course as discussed pre-op with pt and wife. We decided to attempt without an ostomy knowing it might fail - pt preferred. No signs or reasons found on CT scan at this time for a surgical intervention (no drain or ostomy needed). No evidence for surgical correction. Will repeat the Cdiff as pt not responsive to antibiotics and continued watery diarrhea with electrolyte disturbance. If Cdiff negative will consult ID for further guidance. Family dynamic issues discussed with pt, wife, son and nursing as pt's 3 sisters ( who are nurses? ) who are not within the information loop are demanding transfer and close family not wanting it.

## 2021-08-22 NOTE — PROGRESS NOTES
No improvement overnight. CT images viewed, report reviewed and also discussed with radiologist.  No findings to explain pts labs and clinical course. No evidence of ischemia, leak, colitis, or organized abscess. WBC decreased. Stopped heparin and started protonix. No clear evidence of bleed to explain anemia. KEANU with SS minimal.  Consult to Medicine to review electrolyte derangement and anemia with leukocytosis. Reconsulted PT as pt is not getting out of bed at all. IS encouraged. Discussed course and plan at length with wife, son and aunt? (who is nurse). All questions answered.

## 2021-08-23 ENCOUNTER — APPOINTMENT (OUTPATIENT)
Dept: GENERAL RADIOLOGY | Age: 66
DRG: 329 | End: 2021-08-23
Attending: INTERNAL MEDICINE
Payer: COMMERCIAL

## 2021-08-23 LAB
ANION GAP SERPL CALC-SCNC: 7 MMOL/L (ref 3–18)
BASOPHILS # BLD: 0 K/UL (ref 0–0.1)
BASOPHILS NFR BLD: 0 % (ref 0–2)
BUN SERPL-MCNC: 12 MG/DL (ref 7–18)
BUN/CREAT SERPL: 22 (ref 12–20)
CALCIUM SERPL-MCNC: 7.9 MG/DL (ref 8.5–10.1)
CHLORIDE SERPL-SCNC: 104 MMOL/L (ref 100–111)
CO2 SERPL-SCNC: 27 MMOL/L (ref 21–32)
CREAT SERPL-MCNC: 0.54 MG/DL (ref 0.6–1.3)
DIFFERENTIAL METHOD BLD: ABNORMAL
EOSINOPHIL # BLD: 0.9 K/UL (ref 0–0.4)
EOSINOPHIL NFR BLD: 5 % (ref 0–5)
ERYTHROCYTE [DISTWIDTH] IN BLOOD BY AUTOMATED COUNT: 17 % (ref 11.6–14.5)
GLUCOSE SERPL-MCNC: 117 MG/DL (ref 74–99)
HCT VFR BLD AUTO: 22.9 % (ref 36–48)
HGB BLD-MCNC: 7.6 G/DL (ref 13–16)
LYMPHOCYTES # BLD: 1.5 K/UL (ref 0.9–3.6)
LYMPHOCYTES NFR BLD: 8 % (ref 21–52)
MCH RBC QN AUTO: 26.7 PG (ref 24–34)
MCHC RBC AUTO-ENTMCNC: 33.2 G/DL (ref 31–37)
MCV RBC AUTO: 80.4 FL (ref 74–97)
METAMYELOCYTES NFR BLD MANUAL: 1 %
MONOCYTES # BLD: 2.2 K/UL (ref 0.05–1.2)
MONOCYTES NFR BLD: 12 % (ref 3–10)
NEUTS BAND NFR BLD MANUAL: 3 % (ref 0–5)
NEUTS SEG # BLD: 13.8 K/UL (ref 1.8–8)
NEUTS SEG NFR BLD: 71 % (ref 40–73)
PLATELET # BLD AUTO: 486 K/UL (ref 135–420)
PMV BLD AUTO: 9.3 FL (ref 9.2–11.8)
POTASSIUM SERPL-SCNC: 3.5 MMOL/L (ref 3.5–5.5)
RBC # BLD AUTO: 2.85 M/UL (ref 4.35–5.65)
RBC MORPH BLD: ABNORMAL
SODIUM SERPL-SCNC: 138 MMOL/L (ref 136–145)
WBC # BLD AUTO: 18.7 K/UL (ref 4.6–13.2)
WBC MORPH BLD: ABNORMAL

## 2021-08-23 PROCEDURE — 74011250636 HC RX REV CODE- 250/636: Performed by: SURGERY

## 2021-08-23 PROCEDURE — C9113 INJ PANTOPRAZOLE SODIUM, VIA: HCPCS | Performed by: SURGERY

## 2021-08-23 PROCEDURE — 97116 GAIT TRAINING THERAPY: CPT

## 2021-08-23 PROCEDURE — 85025 COMPLETE CBC W/AUTO DIFF WBC: CPT

## 2021-08-23 PROCEDURE — 71046 X-RAY EXAM CHEST 2 VIEWS: CPT

## 2021-08-23 PROCEDURE — 74011250636 HC RX REV CODE- 250/636: Performed by: HOSPITALIST

## 2021-08-23 PROCEDURE — 74011250637 HC RX REV CODE- 250/637: Performed by: SURGERY

## 2021-08-23 PROCEDURE — 80048 BASIC METABOLIC PNL TOTAL CA: CPT

## 2021-08-23 PROCEDURE — 74011000258 HC RX REV CODE- 258: Performed by: HOSPITALIST

## 2021-08-23 PROCEDURE — 36415 COLL VENOUS BLD VENIPUNCTURE: CPT

## 2021-08-23 PROCEDURE — 74011250636 HC RX REV CODE- 250/636: Performed by: INTERNAL MEDICINE

## 2021-08-23 PROCEDURE — 74011000250 HC RX REV CODE- 250: Performed by: SURGERY

## 2021-08-23 PROCEDURE — 74011000258 HC RX REV CODE- 258: Performed by: INTERNAL MEDICINE

## 2021-08-23 PROCEDURE — 65270000029 HC RM PRIVATE

## 2021-08-23 PROCEDURE — 74011250637 HC RX REV CODE- 250/637: Performed by: HOSPITALIST

## 2021-08-23 RX ORDER — FLUCONAZOLE 100 MG/1
400 TABLET ORAL DAILY
Status: DISCONTINUED | OUTPATIENT
Start: 2021-08-24 | End: 2021-08-27 | Stop reason: HOSPADM

## 2021-08-23 RX ADMIN — SODIUM CHLORIDE 40 MG: 9 INJECTION INTRAMUSCULAR; INTRAVENOUS; SUBCUTANEOUS at 07:55

## 2021-08-23 RX ADMIN — PIPERACILLIN AND TAZOBACTAM 4.5 G: 4; .5 INJECTION, POWDER, LYOPHILIZED, FOR SOLUTION INTRAVENOUS; PARENTERAL at 13:18

## 2021-08-23 RX ADMIN — SODIUM CHLORIDE 40 MG: 9 INJECTION INTRAMUSCULAR; INTRAVENOUS; SUBCUTANEOUS at 09:00

## 2021-08-23 RX ADMIN — GABAPENTIN 100 MG: 100 CAPSULE ORAL at 16:22

## 2021-08-23 RX ADMIN — Medication 500 MG: at 08:01

## 2021-08-23 RX ADMIN — AMLODIPINE BESYLATE 5 MG: 5 TABLET ORAL at 08:00

## 2021-08-23 RX ADMIN — METOCLOPRAMIDE HYDROCHLORIDE 5 MG: 5 INJECTION INTRAMUSCULAR; INTRAVENOUS at 21:30

## 2021-08-23 RX ADMIN — ACETAMINOPHEN 1000 MG: 500 TABLET ORAL at 21:30

## 2021-08-23 RX ADMIN — PIPERACILLIN AND TAZOBACTAM 4.5 G: 4; .5 INJECTION, POWDER, LYOPHILIZED, FOR SOLUTION INTRAVENOUS; PARENTERAL at 01:21

## 2021-08-23 RX ADMIN — Medication 500 MG: at 21:30

## 2021-08-23 RX ADMIN — GABAPENTIN 100 MG: 100 CAPSULE ORAL at 21:30

## 2021-08-23 RX ADMIN — METOCLOPRAMIDE HYDROCHLORIDE 5 MG: 5 INJECTION INTRAMUSCULAR; INTRAVENOUS at 03:22

## 2021-08-23 RX ADMIN — PIPERACILLIN AND TAZOBACTAM 4.5 G: 4; .5 INJECTION, POWDER, LYOPHILIZED, FOR SOLUTION INTRAVENOUS; PARENTERAL at 07:53

## 2021-08-23 RX ADMIN — METOCLOPRAMIDE HYDROCHLORIDE 5 MG: 5 INJECTION INTRAMUSCULAR; INTRAVENOUS at 16:23

## 2021-08-23 RX ADMIN — Medication 10 ML: at 21:31

## 2021-08-23 RX ADMIN — ACETAMINOPHEN 1000 MG: 500 TABLET ORAL at 08:01

## 2021-08-23 RX ADMIN — ATORVASTATIN CALCIUM 10 MG: 10 TABLET, FILM COATED ORAL at 08:00

## 2021-08-23 RX ADMIN — GABAPENTIN 100 MG: 100 CAPSULE ORAL at 08:00

## 2021-08-23 RX ADMIN — METOCLOPRAMIDE HYDROCHLORIDE 5 MG: 5 INJECTION INTRAMUSCULAR; INTRAVENOUS at 08:01

## 2021-08-23 RX ADMIN — PIPERACILLIN AND TAZOBACTAM 3.38 G: 3; .375 INJECTION, POWDER, LYOPHILIZED, FOR SOLUTION INTRAVENOUS at 19:44

## 2021-08-23 NOTE — PROGRESS NOTES
Hospitalist Progress Note    Patient: Miranda Kilpatrick MRN: [id number]  CSN: [id number]    YOB: 1955  Age: 61 y.o. Sex: male    DOA: 8/16/2021 LOS:  LOS: 6 days            Chief complaint :  Perforated diverticulosis with abscess    Assessment/Plan     Patient Active Problem List   Diagnosis Code    Abscess of sigmoid colon due to diverticulitis K57.20    Diverticulitis of large intestine with abscess K57.20    Diarrhea R19.7    Hypokalemia E87.6    Hypomagnesemia E83.42    Hypophosphatemia E83.39          61 y.o. male with past medical history significant for hypercholesterolemia is admitted by general surgery. He had perforated diverticulum with abscess. Perforated diverticulosis with abscess -   s/p low anterior resection, mobilization of splenic flexure, omental flap, abscess drainage. on ceftriaxone and Flagyl. Cultures showed growth of light E. coli, Enterococcus faecium, Pseudomonas aeruginosa. Patient is allergic to ciprofloxacin. antibiotics Zosyn  Follow WBC,   Will consult ID    Hypokalemia, hypomagnesemia, hypophosphatemia: Check and replace electrolytes. Likely secondary to diarrhea. Diarrhea -   C. difficile negative, likely antibiotic associated. Started on [employer]. We will recheck C. Difficile. Anemia -  Monitor H/H        Disposition : per primary    Review of systems  General: No fevers or chills. Cardiovascular: No chest pain or pressure. No palpitations. Pulmonary: No shortness of breath. Gastrointestinal: No nausea, vomiting. Physical Exam:  General: Awake, cooperative, no acute distress    HEENT: NC, Atraumatic. PERRLA, anicteric sclerae. Lungs: CTA Bilaterally. No Wheezing/Rhonchi/Rales. Heart:  S1 S2,  No murmur, No Rubs, No Gallops  Abdomen: Soft, Non distended, Non tender.  +Bowel sounds,   Extremities: No c/c/e  Psych:   Not anxious or agitated. Neurologic:  No acute neurological deficit.            Vital signs/Intake and Output:  Visit Vitals  BP (!) 155/62   Pulse 87   Temp 98.4 °F (36.9 °C)   Resp 16   Ht 5' 11.5\" (1.816 m)   Wt 98 kg (216 lb)   SpO2 95%   BMI 29.71 kg/m²     Current Shift:  No intake/output data recorded. Last three shifts:  08/21 0701 - 08/22 1900  In: 700 [I.V.:700]  Out: 25 [Drains:25]            Labs: Results:       Chemistry Recent Labs     08/22/21  0510 08/21/21  0516 08/20/21  1352   * 124* 117*    134* 134*   K 3.6 3.4* 3.2*    101 102   CO2 27 26 28   BUN 16 16 15   CREA 0.45* 0.48* 0.68   CA 7.9* 8.0* 7.7*   AGAP 8 7 4   BUCR 36* 33* 22*      CBC w/Diff Recent Labs     08/22/21  0510 08/21/21  0516 08/20/21  1352   WBC 15.6* 14.8* 19.4*   RBC 2.69* 2.91* 3.28*   HGB 7.2* 7.8* 9.0*   HCT 21.8* 22.9* 26.9*    342 332   GRANS 82* 83* 89*   LYMPH 7* 6* 4*   EOS 1 1 0      Cardiac Enzymes No results for input(s): CPK, CKND1, JAMI in the last 72 hours. No lab exists for component: CKRMB, TROIP   Coagulation No results for input(s): PTP, INR, APTT, INREXT in the last 72 hours. Lipid Panel No results found for: CHOL, CHOLPOCT, CHOLX, CHLST, CHOLV, 239631, HDL, HDLP, LDL, LDLC, DLDLP, 389158, VLDLC, VLDL, TGLX, TRIGL, TRIGP, TGLPOCT, CHHD, CHHDX   BNP No results for input(s): BNPP in the last 72 hours. Liver Enzymes No results for input(s): TP, ALB, TBIL, AP in the last 72 hours.     No lab exists for component: SGOT, GPT, DBIL   Thyroid Studies No results found for: T4, T3U, TSH, TSHEXT     Procedures/imaging: see electronic medical records for all procedures/Xrays and details which were not copied into this note but were reviewed prior to creation of Plan

## 2021-08-23 NOTE — PROGRESS NOTES
0700- shift report received    0755- physical assessment completed      1300- wife at bedside, questions and plan of care discussed. 1420- IV taped for patient to shower, informed to call out for dressing change after shower     1615- dressing change completed to abdomen per MD orders. Removed 4x4 over drain site and covered with 4x4 and tape.  All incisions appear to be healing properly with no noted indications of infection

## 2021-08-23 NOTE — CONSULTS
[facility]  (A Division of [address] [place] [address])                                                                                                                       Chelsie Proctor MD  Office #:     [phone]  [phone]   Office Fax: [phone]     Date of Admission: 8/16/2021Date of Note: 8/23/2021      Reason for Referral: I was asked to see patient by Dr Abhishek Dickey for evaluation and antibiotic management of leucocytosis S/P abd surgery    Thank you for involving me in the care of this patient. Please do not hesitate to contact me on the above number if question or concern. Current Antimicrobials:    Prior Antimicrobials:  Zosyn 8/21 to date    Immunosuppressive drugs:na Ceftriaxone and flagyl  8/16 to 8/21  Cipro-> Bactrim PO + Flagyl as OP       Assessment- ID related:  --------------------------------------------------------------------------  · Leucocytosis-- inclining up while on Zosyn for intra-abdominal issue. Possible etiology for uncovered pathogen for abd infection Vs other source such as lung? Reactive? Doubt C.diff.  Doubt pna/ urinary source  · Post op abd fluid collection- not suggestive of abscess per report 8/21/21  · S/P bowel resection, flap, abscess drainage, ureteral stent placement- 8/16/21  · Failed conservative management of perforated diverticulitis with mixed infection in drain- 7/16/21  -E.coli, pseudmonoas, E.fecium in drain fluid   Other Medical Issues- Mx per respective team:  -Abn electrolytes    Past ID Issues:  See above   Recommendation for ID issues I am following:  ------------------------------------------------------------------------------  Clinically looks ok/feels better    Check prior abx regimen  Agree with zosyn  Will add Fluconazole     Monitor wbc  Check CXR-PA and lateral    DW with Dr Abhishek Dickey       HPI: History of   Blanche Fragoso is 60 YO WHITE/NON- male with history of complicated diverticulitis with perforation in July. Manged conservatively with drain and oral abx, didn't respond well and brought and underwent abd surgery on 8/16/21. Culture from abd drainage in July + Pseudomnas, E.coli and E.fecium. Initial abx of Cipro and flagyl PO as OP, and cipro changed to Bactrim due to side effect. He had a drain placed after OR on 8/16 and was on Ceftriaxone and Flagyl when he started to have wbc go up to 19K. Tested for C.diff X2, ABX changed to zosyn over weekend. But wbc still inclining up. He feels overall better. His drain was taken out today. Occasional cough, no other complaints. Active Hospital Problems    Diagnosis Date Noted    Diarrhea 08/21/2021    Hypokalemia 08/21/2021    Hypomagnesemia 08/21/2021    Hypophosphatemia 08/21/2021    Diverticulitis of large intestine with abscess 08/16/2021     Past Medical History:   Diagnosis Date    High cholesterol      Past Surgical History:   Procedure Laterality Date    HX APPENDECTOMY  1968    IR INJ ABS CYST VIA CATHETER / TUBE  7/26/2021     History reviewed. No pertinent family history. Social History     Socioeconomic History    Marital status:      Spouse name: Not on file    Number of children: Not on file    Years of education: Not on file    Highest education level: Not on file   Occupational History    Not on file   Tobacco Use    Smoking status: Never Smoker    Smokeless tobacco: Never Used   Vaping Use    Vaping Use: Never used   Substance and Sexual Activity    Alcohol use:  Yes     Alcohol/week: 5.0 standard drinks     Types: 2 Glasses of wine, 3 Shots of liquor per week    Drug use: Never    Sexual activity: Yes   Other Topics Concern    Not on file   Social History Narrative    Not on file     Social Determinants of Health     Financial Resource Strain:     Difficulty of Paying Living Expenses:    Food Insecurity:     Worried About Running Out of Food in the Last Year:     [address] in the Last Year: Transportation Needs:     Lack of Transportation (Medical):  Lack of Transportation (Non-Medical):    Physical Activity:     Days of Exercise per Week:     Minutes of Exercise per Session:    Stress:     Feeling of Stress :    Social Connections:     Frequency of Communication with Friends and Family:     Frequency of Social Gatherings with Friends and Family:     Attends Mandaen Services:     Active Member of Clubs or Organizations:     Attends Club or Organization Meetings:     Marital Status:    Intimate Partner Violence:     Fear of Current or Ex-Partner:     Emotionally Abused:     Physically Abused:     Sexually Abused: Allergies:  Ciprofloxacin     Medications:  Current Facility-Administered Medications   Medication Dose Route Frequency    pantoprazole (PROTONIX) 40 mg in 0.9% sodium chloride 10 mL injection  40 mg IntraVENous DAILY    Saccharomyces boulardii (FLORASTOR) capsule 500 mg  500 mg Oral BID    piperacillin-tazobactam (ZOSYN) 4.5 g in 0.9% sodium chloride (MBP/ADV) 100 mL MBP  4.5 g IntraVENous Q6H    amLODIPine (NORVASC) tablet 5 mg  5 mg Oral DAILY    atorvastatin (LIPITOR) tablet 10 mg  10 mg Oral DAILY    sodium chloride (NS) flush 5-40 mL  5-40 mL IntraVENous Q8H    sodium chloride (NS) flush 5-40 mL  5-40 mL IntraVENous PRN    magnesium sulfate 2 g/50 ml IVPB (premix or compounded)  2 g IntraVENous PRN    acetaminophen (TYLENOL) tablet 1,000 mg  1,000 mg Oral TID    morphine injection 2 mg  2 mg IntraVENous Q2H PRN    metoclopramide HCl (REGLAN) injection 5 mg  5 mg IntraVENous Q6H    gabapentin (NEURONTIN) capsule 100 mg  100 mg Oral TID        ROS:  A comprehensive review of systems was negative.      Physical Exam:    Temp (24hrs), Av.6 °F (37 °C), Min:97.9 °F (36.6 °C), Max:99 °F (37.2 °C)    Visit Vitals  /67   Pulse (!) 104   Temp 97.9 °F (36.6 °C)   Resp 16   Ht 5' 11.5\" (1.816 m)   Wt 98 kg (216 lb)   SpO2 100%   BMI 29.71 kg/m² GEN: WDWN, not in resp distress. HEENT: Unicteric. EOMI intact  CHEST: Non laboured breathing  CVS:RRR, no mur/gallop  ABD: Obese/soft. Non tender. DAVID: Deferred  EXT: No apparent swelling or redness on UE/LE joints. Skin: Dry and intact. No rash, no redness. CNS: A, OX3. Moves all extremity. CN grossly ok. Microbiology  All Micro Results     Procedure Component Value Units Date/Time    C. DIFFICILE AG & TOXIN A/B [[id number]] Collected: 21    Order Status: Completed Specimen: Stool Updated: 21 1537     [address] ANTIGEN Negative        C. difficile toxin Negative        INTERPRETATION       NEGATIVE FOR TOXIGENIC C. DIFFICILE          C. DIFFICILE AG & TOXIN A/B [[id number]] Collected: 21    Order Status: Canceled Specimen: Stool     C. DIFFICILE AG & TOXIN A/B [[id number]] Collected: 21 1830    Order Status: Completed Specimen: Stool Updated: 21     [facility] ANTIGEN Negative        C. difficile toxin Negative        INTERPRETATION       NEGATIVE FOR TOXIGENIC C. DIFFICILE                   Chemistry  Recent Labs     21  0753 21  0510 21  0516   * 103* 124*    137 134*   K 3.5 3.6 3.4*    102 101   CO2 27 27 26   BUN 12 16 16   CREA 0.54* 0.45* 0.48*   CA 7.9* 7.9* 8.0*   AGAP 7 8 7   BUCR 22* 36* 33*       CBC w/ Diff  Recent Labs     21  0753 21  0510 21  0516   WBC 18.7* 15.6* 14.8*   RBC 2.85* 2.69* 2.91*   HGB 7.6* 7.2* 7.8*   HCT 22.9* 21.8* 22.9*   * 371 342   GRANS 71 82* 83*   LYMPH 8* 7* 6*   EOS 5 1 1     Imagin/21 CT AP with drain:  1. Mild bibasilar atelectasis, left greater than right with trace bilateral  effusions. 2. Evidence of recent prior low anterior resection.  Oral contrast noted to  extend to the rectum without evidence of extraluminal oral contrast.     > Several areas of somewhat ill-defined fluid noted immediately subjacent to  the lower portion of the midline laparotomy as well as along the left paracolic  gutter without soft tissue gas formation or rim-enhancing wall to suggest formed  abscess. 3. No significant bowel dilatation demonstrated. No morphology of bowel  obstruction.

## 2021-08-23 NOTE — PROGRESS NOTES
Dr. Gunnar Frazier requested staff call [facility] to find out name of antibiotic pt was taking in July. Pharmacy states pt was taking flagyl.  Pt had a one day supply of neomycin on 8/11/2021

## 2021-08-23 NOTE — PROGRESS NOTES
Hospitalist Progress Note    Patient: Tyree Payton MRN: [id number]  CSN: [id number]    YOB: 1955  Age: 61 y.o. Sex: male    DOA: 8/16/2021 LOS:  LOS: 7 days            Chief complaint :  Perforated diverticulosis with abscess    Assessment/Plan     Patient Active Problem List   Diagnosis Code    Abscess of sigmoid colon due to diverticulitis K57.20    Diverticulitis of large intestine with abscess K57.20    Diarrhea R19.7    Hypokalemia E87.6    Hypomagnesemia E83.42    Hypophosphatemia E83.39          61 y.o. male with past medical history significant for hypercholesterolemia is admitted by general surgery. He had perforated diverticulum with abscess. Perforated diverticulosis with abscess -   s/p low anterior resection, mobilization of splenic flexure, omental flap, abscess drainage. on ceftriaxone and Flagyl. Cultures showed growth of light E. coli, Enterococcus faecium, Pseudomonas aeruginosa. Patient is allergic to ciprofloxacin. antibiotics Zosyn  Follow WBC,   ID consult appreciated, discussed with Dr. Caryle Beagle  Fluconazole added    Hypokalemia, hypomagnesemia, hypophosphatemia: Check and replace electrolytes. Likely secondary to diarrhea. Diarrhea -   C. difficile negative,    Anemia -  Monitor H/H        Disposition : per primary    Review of systems  General: No fevers or chills. Cardiovascular: No chest pain or pressure. No palpitations. Pulmonary: No shortness of breath. Gastrointestinal: No nausea, vomiting. Physical Exam:  General: Awake, cooperative, no acute distress    HEENT: NC, Atraumatic. PERRLA, anicteric sclerae. Lungs: CTA Bilaterally. No Wheezing/Rhonchi/Rales. Heart:  S1 S2,  No murmur, No Rubs, No Gallops  Abdomen: Soft, Non distended, Non tender.  +Bowel sounds,   Extremities: No c/c/e  Psych:   Not anxious or agitated. Neurologic:  No acute neurological deficit.            Vital signs/Intake and Output:  Visit Vitals  BP (!) 151/62 Pulse 89   Temp 98.7 °F (37.1 °C)   Resp 16   Ht 5' 11.5\" (1.816 m)   Wt 98 kg (216 lb)   SpO2 95%   BMI 29.71 kg/m²     Current Shift:  No intake/output data recorded. Last three shifts:  08/21 1901 - 08/23 0700  In: 940 [P.O.:240; I.V.:700]  Out: 25 [Drains:25]            Labs: Results:       Chemistry Recent Labs     08/23/21  0753 08/22/21  0510 08/21/21  0516   * 103* 124*    137 134*   K 3.5 3.6 3.4*    102 101   CO2 27 27 26   BUN 12 16 16   CREA 0.54* 0.45* 0.48*   CA 7.9* 7.9* 8.0*   AGAP 7 8 7   BUCR 22* 36* 33*      CBC w/Diff Recent Labs     08/23/21 0753 08/22/21  0510 08/21/21  0516   WBC 18.7* 15.6* 14.8*   RBC 2.85* 2.69* 2.91*   HGB 7.6* 7.2* 7.8*   HCT 22.9* 21.8* 22.9*   * 371 342   GRANS 71 82* 83*   LYMPH 8* 7* 6*   EOS 5 1 1      Cardiac Enzymes No results for input(s): CPK, CKND1, JAMI in the last 72 hours. No lab exists for component: CKRMB, TROIP   Coagulation No results for input(s): PTP, INR, APTT, INREXT, INREXT in the last 72 hours. Lipid Panel No results found for: CHOL, CHOLPOCT, CHOLX, CHLST, CHOLV, 519218, HDL, HDLP, LDL, LDLC, DLDLP, 130288, VLDLC, VLDL, TGLX, TRIGL, TRIGP, TGLPOCT, CHHD, CHHDX   BNP No results for input(s): BNPP in the last 72 hours. Liver Enzymes No results for input(s): TP, ALB, TBIL, AP in the last 72 hours.     No lab exists for component: SGOT, GPT, DBIL   Thyroid Studies No results found for: T4, T3U, TSH, TSHEXT, TSHEXT     Procedures/imaging: see electronic medical records for all procedures/Xrays and details which were not copied into this note but were reviewed prior to creation of Plan

## 2021-08-23 NOTE — PROGRESS NOTES
Problem: Mobility Impaired (Adult and Pediatric)  Goal: *Acute Goals and Plan of Care (Insert Text)  Description: Physical Therapy short term goals initiated 08/22/21, to be achieved in 3-7 days. Pt will:   1. Perform bed mobility with S in prep for OOB activity. 2. Perform sit <> stand transfers with LRAD and S in prep for functional mobility and ambulation. 3. Ambulate 100 ft with LRAD and S in prep for household and community mobility. 4. Ascend/descend >2 stairs with 1 HR and S for safe home entry. Outcome: Progressing Towards Goal  physical Therapy TREATMENT    Patient: Tana Esquivel (78 y.o. male)  Date: 8/23/2021  Diagnosis: Diverticulitis of large intestine with abscess [K57.20] <principal problem not specified>  Procedure(s) (LRB):  ROBOTIC ASSIST LOW ANTERIOR RESECTION, MOBILIZATION OF SPLENIC FLEXURE, OMENTAL FLAP, ABSCESS DRAINAGE, CYSTOSCOPY WITH URETERAL STENT PLACEMENT (ERAS PROTOCOL) (N/A) 7 Days Post-Op  Precautions:   Chart, physical therapy assessment, plan of care and goals were reviewed. ASSESSMENT:  Pt supine in bed on arrival. Transitions to sit EOB with S. STS with supervision. Able to increase gt distance to 220 ft with supervision in michael (C-Diff neg) with decr'd turner and decr'd arm swing. Verbal cues to increase arm swing bilat for more normalized gt and pt demon understanding. Returned to room and left seated EOB in prep for meal and spouse present and supportive. All goals met except step nego. Spouse reports pt will have flight of steps to nego upon return home. Progression toward goals:  [x]      Improving appropriately and progressing toward goals  []      Improving slowly and progressing toward goals  []      Not making progress toward goals and plan of care will be adjusted     PLAN:  Patient continues to benefit from skilled intervention to address the above impairments. Continue treatment per established plan of care.   Discharge Recommendations: None  Further Equipment Recommendations for Discharge:  N/A     SUBJECTIVE:   Patient stated Tired.     OBJECTIVE DATA SUMMARY:   Critical Behavior:  Neurologic State: Alert  Orientation Level: Oriented X4  Cognition: Appropriate decision making, Appropriate for age attention/concentration, Appropriate safety awareness  Safety/Judgement: Awareness of environment  Functional Mobility Training:  Bed Mobility:  Rolling: Modified independent  Supine to Sit: Modified independent  Scooting: Modified independent  Transfers:  Sit to Stand: Supervision  Stand to Sit: Supervision  Balance:  Sitting: Intact  Standing: Intact; Without support  Standing - Static: Good  Standing - Dynamic : Good  Ambulation/Gait Training:  Distance (ft): 220 Feet (ft)  Assistive Device: Gait belt (pectorally placed)  Ambulation - Level of Assistance: Supervision  Gait Abnormalities: Decreased step clearance; Altered arm swing (arm swing decrld bilat)  Speed/Jaleesa: Pace decreased (<100 feet/min)  Interventions: Verbal cues; Safety awareness training (increased use of arms swing )  Therapeutic Exercises:   Knee F/ext x 10, TR, HR x 10  Pain:  Pain Scale 1: Numeric (0 - 10)  Pain Intensity 1: 0  Activity Tolerance:   Fair   Please refer to the flowsheet for vital signs taken during this treatment.   After treatment:   [x] Patient left in no apparent distress sitting up EOB  [] Patient left in no apparent distress in bed  [x] Call bell left within reach  [x] Nursing notified  [x] Caregiver present  [] Bed alarm activated      Linda Sotelo PT   Time Calculation: 18 mins

## 2021-08-23 NOTE — PROGRESS NOTES
D/C Plan: [facility] and physician follow up     CM met with pt and his wife at bedside to discuss transition of care. CM discussed options for [place]. Pt and his wife have selected [facility] as a first choice and [facility] as a second choice and [facility] as a third choice. .  Please provide specific wound care orders to assist with transition of care. Noted possible need for TPN. CM to continue to follow and assist with transition of care. Care Management Interventions  PCP Verified by CM: Yes  Palliative Care Criteria Met (RRAT>21 & CHF Dx)?: No  Mode of Transport at Discharge:  Other (see comment) (Family)  Transition of Care Consult (CM Consult): Discharge Planning, [address]: Yes  Health Maintenance Reviewed: Yes  Physical Therapy Consult: Yes  Occupational Therapy Consult: Yes  Current Support Network: Lives with Spouse  Confirm Follow Up Transport: Family  The Plan for Transition of Care is Related to the Following Treatment Goals : [facility] and physician follow up   The Patient and/or Patient Representative was Provided with a Choice of Provider and Agrees with the Discharge Plan?: Yes  Name of the Patient Representative Who was Provided with a Choice of Provider and Agrees with the Discharge Plan: pt  Freedom of Choice List was Provided with Basic Dialogue that Supports the Patient's Individualized Plan of Care/Goals, Treatment Preferences and Shares the Quality Data Associated with the Providers?: Yes  Discharge Location  Discharge Placement: Home with home health [facility])

## 2021-08-24 LAB
BASOPHILS # BLD: 0 K/UL (ref 0–0.1)
BASOPHILS NFR BLD: 0 % (ref 0–2)
DIFFERENTIAL METHOD BLD: ABNORMAL
EOSINOPHIL # BLD: 0.4 K/UL (ref 0–0.4)
EOSINOPHIL NFR BLD: 2 % (ref 0–5)
ERYTHROCYTE [DISTWIDTH] IN BLOOD BY AUTOMATED COUNT: 17.8 % (ref 11.6–14.5)
HCT VFR BLD AUTO: 22.5 % (ref 36–48)
HGB BLD-MCNC: 7.4 G/DL (ref 13–16)
LYMPHOCYTES # BLD: 0 K/UL (ref 0.9–3.6)
LYMPHOCYTES NFR BLD: 0 % (ref 21–52)
MCH RBC QN AUTO: 27.1 PG (ref 24–34)
MCHC RBC AUTO-ENTMCNC: 32.9 G/DL (ref 31–37)
MCV RBC AUTO: 82.4 FL (ref 78–100)
METAMYELOCYTES NFR BLD MANUAL: 2 %
MONOCYTES # BLD: 1.5 K/UL (ref 0.05–1.2)
MONOCYTES NFR BLD: 7 % (ref 3–10)
MYELOCYTES NFR BLD MANUAL: 1 %
NEUTS SEG # BLD: 18.3 K/UL (ref 1.8–8)
NEUTS SEG NFR BLD: 88 % (ref 40–73)
PLATELET # BLD AUTO: 595 K/UL (ref 135–420)
PMV BLD AUTO: 9.2 FL (ref 9.2–11.8)
RBC # BLD AUTO: 2.73 M/UL (ref 4.35–5.65)
RBC MORPH BLD: ABNORMAL
RBC MORPH BLD: ABNORMAL
WBC # BLD AUTO: 20.8 K/UL (ref 4.6–13.2)
WBC MORPH BLD: ABNORMAL

## 2021-08-24 PROCEDURE — 74011250637 HC RX REV CODE- 250/637: Performed by: SURGERY

## 2021-08-24 PROCEDURE — 74011000250 HC RX REV CODE- 250: Performed by: SURGERY

## 2021-08-24 PROCEDURE — 65270000029 HC RM PRIVATE

## 2021-08-24 PROCEDURE — 85025 COMPLETE CBC W/AUTO DIFF WBC: CPT

## 2021-08-24 PROCEDURE — 74011250637 HC RX REV CODE- 250/637: Performed by: HOSPITALIST

## 2021-08-24 PROCEDURE — 97116 GAIT TRAINING THERAPY: CPT

## 2021-08-24 PROCEDURE — 74011250636 HC RX REV CODE- 250/636: Performed by: SURGERY

## 2021-08-24 PROCEDURE — 74011000258 HC RX REV CODE- 258: Performed by: INTERNAL MEDICINE

## 2021-08-24 PROCEDURE — C9113 INJ PANTOPRAZOLE SODIUM, VIA: HCPCS | Performed by: SURGERY

## 2021-08-24 PROCEDURE — 36415 COLL VENOUS BLD VENIPUNCTURE: CPT

## 2021-08-24 PROCEDURE — 74011250636 HC RX REV CODE- 250/636: Performed by: INTERNAL MEDICINE

## 2021-08-24 PROCEDURE — 74011250637 HC RX REV CODE- 250/637: Performed by: INTERNAL MEDICINE

## 2021-08-24 RX ADMIN — Medication 10 ML: at 14:00

## 2021-08-24 RX ADMIN — FLUCONAZOLE 400 MG: 100 TABLET ORAL at 09:31

## 2021-08-24 RX ADMIN — Medication 10 ML: at 22:00

## 2021-08-24 RX ADMIN — ATORVASTATIN CALCIUM 10 MG: 10 TABLET, FILM COATED ORAL at 09:31

## 2021-08-24 RX ADMIN — ACETAMINOPHEN 1000 MG: 500 TABLET ORAL at 15:12

## 2021-08-24 RX ADMIN — SODIUM CHLORIDE 40 MG: 9 INJECTION INTRAMUSCULAR; INTRAVENOUS; SUBCUTANEOUS at 09:31

## 2021-08-24 RX ADMIN — ACETAMINOPHEN 1000 MG: 500 TABLET ORAL at 21:29

## 2021-08-24 RX ADMIN — METOCLOPRAMIDE HYDROCHLORIDE 5 MG: 5 INJECTION INTRAMUSCULAR; INTRAVENOUS at 02:58

## 2021-08-24 RX ADMIN — PIPERACILLIN AND TAZOBACTAM 3.38 G: 3; .375 INJECTION, POWDER, LYOPHILIZED, FOR SOLUTION INTRAVENOUS at 06:14

## 2021-08-24 RX ADMIN — GABAPENTIN 100 MG: 100 CAPSULE ORAL at 09:32

## 2021-08-24 RX ADMIN — ACETAMINOPHEN 1000 MG: 500 TABLET ORAL at 09:31

## 2021-08-24 RX ADMIN — AMLODIPINE BESYLATE 5 MG: 5 TABLET ORAL at 09:31

## 2021-08-24 RX ADMIN — Medication 500 MG: at 09:31

## 2021-08-24 RX ADMIN — METOCLOPRAMIDE HYDROCHLORIDE 5 MG: 5 INJECTION INTRAMUSCULAR; INTRAVENOUS at 09:31

## 2021-08-24 RX ADMIN — METOCLOPRAMIDE HYDROCHLORIDE 5 MG: 5 INJECTION INTRAMUSCULAR; INTRAVENOUS at 15:12

## 2021-08-24 RX ADMIN — GABAPENTIN 100 MG: 100 CAPSULE ORAL at 21:29

## 2021-08-24 RX ADMIN — Medication 500 MG: at 21:29

## 2021-08-24 RX ADMIN — PIPERACILLIN AND TAZOBACTAM 3.38 G: 3; .375 INJECTION, POWDER, LYOPHILIZED, FOR SOLUTION INTRAVENOUS at 18:07

## 2021-08-24 RX ADMIN — GABAPENTIN 100 MG: 100 CAPSULE ORAL at 15:12

## 2021-08-24 RX ADMIN — PIPERACILLIN AND TAZOBACTAM 3.38 G: 3; .375 INJECTION, POWDER, LYOPHILIZED, FOR SOLUTION INTRAVENOUS at 12:18

## 2021-08-24 RX ADMIN — Medication 10 ML: at 06:15

## 2021-08-24 RX ADMIN — PIPERACILLIN AND TAZOBACTAM 3.38 G: 3; .375 INJECTION, POWDER, LYOPHILIZED, FOR SOLUTION INTRAVENOUS at 01:19

## 2021-08-24 NOTE — PROGRESS NOTES
Bedside and verbal shift change report recieved from [address] (offgoing nurse) given to Lenny Tay RN (oncoming nurse). Report included the following information SBAR, Kardex, Intake/Output, MAR and Recent Results       Bedside and verbal shift change report given to Eneida Leary RN (oncoming nurse) by Kahlil Bustillos RN (offgoing nurse).  Report included the following information SBAR, Kardex, Intake/Output, MAR and Recent Results

## 2021-08-24 NOTE — PROGRESS NOTES
Nutrition Assessment     Type and Reason for Visit: Reassess    Nutrition Recommendations/Plan: Continue w/ POC    Nutrition Assessment:  Patient admitted for Diverticulitis of large intestine with abscess, s/p low anterior resection, mobilization of splenic flexure, omental flap, abscess drainage. Diet remains on full liquids. Estimated Daily Nutrient Needs:  Energy (kcal):  2170  Protein (g):  99       Fluid (ml/day):  2170    Nutrition Related Findings:  Med: florastor, protonix, reglan, mag sulfate as needed. +BM 8/23-loose/watery. Patient reported eating what he can and doing the best he can since patient is still on a full liquid diet. Patient was eating lunch at time of call and breakfast this morning was fine. Patient likes the ensure and does drink it. Patient currently has two orders of Ensure Enlive- Will discontinue one of the two oral nutrition supplement as they are duplicates.     Current Nutrition Therapies:  ADULT ORAL NUTRITION SUPPLEMENT Lunch, Dinner; Standard High Calorie/High Protein  ADULT DIET Full Liquid  ADULT ORAL NUTRITION SUPPLEMENT Breakfast, Lunch, Dinner; Standard High Calorie/High Protein    Anthropometric Measures:  · Height:  5' 11.46\" (181.5 cm)  · Current Body Wt:  98.9 kg (218 lb 0.6 oz)  · BMI: 30    Nutrition Diagnosis:   · Predicted inadequate energy intake related to altered GI function as evidenced by intake 26-50%    Nutrition Intervention:  Food and/or Nutrient Delivery: Continue current diet, Continue oral nutrition supplement  Nutrition Education and Counseling: No recommendations at this time  Coordination of Nutrition Care: Continue to monitor while inpatient    Goals:  continue PO >75% of most meals and supplements throughout the next 2-3 days       Nutrition Monitoring and Evaluation:   Behavioral-Environmental Outcomes: None identified  Food/Nutrient Intake Outcomes: Food and nutrient intake, Supplement intake, Diet advancement/tolerance  Physical Signs/Symptoms Outcomes: Biochemical data, Meal time behavior, Skin, Weight, Nausea/vomiting, GI status    Discharge Planning:    Continue current diet, Continue oral nutrition supplement     Electronically signed by Alireza Alanis RD on 8/24/2021 at 1:37 PM

## 2021-08-24 NOTE — PROGRESS NOTES
Bedside and Verbal shift change report given to [address] (oncoming nurse) by Rodrigo English (offgoing nurse). Report included the following information SBAR, Kardex, Intake/Output, MAR and Recent Results. Shift Summary-   Patient Vitals for the past 12 hrs:   Temp Pulse Resp BP SpO2   08/24/21 0306 98.2 °F (36.8 °C) 89 20 (!) 126/59 97 %   08/23/21 2331 98.2 °F (36.8 °C) (!) 102 18 (!) 132/57 100 %   08/23/21 2112 98.2 °F (36.8 °C) (!) 104 16 (!) 142/60 96 %   08/23/21 1641 98.7 °F (37.1 °C) 89 16 (!) 151/62 95 %   Pt had uneventful shift    Bedside and Verbal shift change report given to Autumn Pugh (oncoming nurse) by [address] (offgoing nurse). Report included the following information SBAR, Kardex, Intake/Output, MAR and Recent Results.

## 2021-08-24 NOTE — PROGRESS NOTES
[facility]  (A Division of [address] [place] [address])                                                                                                                       Chelsie Wahl MD  Office #:     [phone]  [phone]   Office Fax: [phone]     Date of Admission: 8/16/2021Date of Note: 8/24/2021  Reason for FU: I was asked to see patient by Dr Adelita Santamaria for evaluation and antibiotic management of leucocytosis S/P abd surgery    Dr Lambert Sloan [facility] from tomorrow, August 24. I will resume routine rounding on Monday, August 30 -2021. Please call via  for questions/consults over weekend. Thanks. Current Antimicrobials:    Prior Antimicrobials:  Zosyn 8/21 to date    Immunosuppressive drugs:na Ceftriaxone and flagyl  8/16 to 8/21  Cipro-> Bactrim PO + Flagyl as OP       Assessment- ID related:  --------------------------------------------------------------------------  · Leucocytosis-- inclining up while on Zosyn for intra-abdominal issue. Possible etiology for uncovered pathogen for abd infection Vs other source such as lung? Reactive? Doubt C.diff. Doubt pna/ urinary source.  Atelectasis on CXR  · Post op abd fluid collection- not suggestive of abscess per report 8/21/21  · S/P bowel resection, flap, abscess drainage, ureteral stent placement- 8/16/21  · Failed conservative management of perforated diverticulitis with mixed infection in drain- 7/16/21  -E.coli, pseudmonoas, E.fecium in drain fluid  Atelectasis- B/L on CXR-8/23     Other Medical Issues- Mx per respective team:  -Abn electrolytes    Past ID Issues:  See above   Recommendation for ID issues I am following:  ------------------------------------------------------------------------------  Clinically looks stable     Cont Zosyn  Cont fluconazole  Encourge IS    CBC/CMP/ procal in am    If wbc remains up-- consideration would be to re-scan for FU of abd loculation and aspiration for fluid gram stain and culture. DW patient       Condition: Stable    Subjective:  No fever. Energy good. Ambulates ok, BM loose but no N/V.   abd feels slightly distended- he relates it to carbonated drinks  No issue with passing urine  No joint ache or pain    dw with him and wife that WBC remains up       HPI: History of   Alexx Nj is 62 YO WHITE/NON- male with history of complicated diverticulitis with perforation in July. Manged conservatively with drain and oral abx, didn't respond well and brought and underwent abd surgery on 8/16/21. Culture from abd drainage in July + Pseudomnas, E.coli and E.fecium. Initial abx of Cipro and flagyl PO as OP, and cipro changed to Bactrim due to side effect. He had a drain placed after OR on 8/16 and was on Ceftriaxone and Flagyl when he started to have wbc go up to 19K. Tested for C.diff X2, ABX changed to zosyn over weekend. But wbc still inclining up. He feels overall better. His drain was taken out today. Occasional cough, no other complaints. Active Hospital Problems    Diagnosis Date Noted    Diarrhea 08/21/2021    Hypokalemia 08/21/2021    Hypomagnesemia 08/21/2021    Hypophosphatemia 08/21/2021    Diverticulitis of large intestine with abscess 08/16/2021     Past Medical History:   Diagnosis Date    High cholesterol      Past Surgical History:   Procedure Laterality Date    HX APPENDECTOMY  1968    IR INJ ABS CYST VIA CATHETER / TUBE  7/26/2021     History reviewed. No pertinent family history. Social History     Socioeconomic History    Marital status:      Spouse name: Not on file    Number of children: Not on file    Years of education: Not on file    Highest education level: Not on file   Occupational History    Not on file   Tobacco Use    Smoking status: Never Smoker    Smokeless tobacco: Never Used   Vaping Use    Vaping Use: Never used   Substance and Sexual Activity    Alcohol use:  Yes     Alcohol/week: 5.0 standard drinks     Types: 2 Glasses of wine, 3 Shots of liquor per week    Drug use: Never    Sexual activity: Yes   Other Topics Concern    Not on file   Social History Narrative    Not on file     Social Determinants of Health     Financial Resource Strain:     Difficulty of Paying Living Expenses:    Food Insecurity:     Worried About Running Out of Food in the Last Year:     [address] in the Last Year:    Transportation Needs:     Lack of Transportation (Medical):  Lack of Transportation (Non-Medical):    Physical Activity:     Days of Exercise per Week:     Minutes of Exercise per Session:    Stress:     Feeling of Stress :    Social Connections:     Frequency of Communication with Friends and Family:     Frequency of Social Gatherings with Friends and Family:     Attends Baptism Services:     Active Member of Clubs or Organizations:     Attends Club or Organization Meetings:     Marital Status:    Intimate Partner Violence:     Fear of Current or Ex-Partner:     Emotionally Abused:     Physically Abused:     Sexually Abused:         Allergies:  Ciprofloxacin     Medications:  Current Facility-Administered Medications   Medication Dose Route Frequency    fluconazole (DIFLUCAN) tablet 400 mg  400 mg Oral DAILY    piperacillin-tazobactam (ZOSYN) 3.375 g in 0.9% sodium chloride (MBP/ADV) 100 mL MBP  3.375 g IntraVENous Q6H    pantoprazole (PROTONIX) 40 mg in 0.9% sodium chloride 10 mL injection  40 mg IntraVENous DAILY    Saccharomyces boulardii (FLORASTOR) capsule 500 mg  500 mg Oral BID    amLODIPine (NORVASC) tablet 5 mg  5 mg Oral DAILY    atorvastatin (LIPITOR) tablet 10 mg  10 mg Oral DAILY    sodium chloride (NS) flush 5-40 mL  5-40 mL IntraVENous Q8H    sodium chloride (NS) flush 5-40 mL  5-40 mL IntraVENous PRN    magnesium sulfate 2 g/50 ml IVPB (premix or compounded)  2 g IntraVENous PRN    acetaminophen (TYLENOL) tablet 1,000 mg  1,000 mg Oral TID    morphine injection 2 mg  2 mg IntraVENous Q2H PRN    metoclopramide HCl (REGLAN) injection 5 mg  5 mg IntraVENous Q6H    gabapentin (NEURONTIN) capsule 100 mg  100 mg Oral TID        ROS:  A comprehensive review of systems was negative. Physical Exam:    Temp (24hrs), Av.3 °F (36.8 °C), Min:97.8 °F (36.6 °C), Max:98.7 °F (37.1 °C)    Visit Vitals  BP (!) 142/58 (BP 1 Location: Left upper arm, BP Patient Position: At rest)   Pulse 96   Temp 98.5 °F (36.9 °C)   Resp 18   Ht 5' 11.46\" (1.815 m)   Wt 98.9 kg (218 lb 0.6 oz)   SpO2 97%   BMI 30.02 kg/m²          GEN: WDWN, not in resp distress. HEENT: Unicteric. EOMI intact  CHEST: Non laboured breathing  CVS:RRR, no mur/gallop  ABD: Obese/soft. No marked tenderness- except soreness at drain site- R abd. Active BS  DAVID: Deferred  EXT: No apparent swelling or redness on UE/LE joints. Skin: Dry and intact. No rash, no redness. CNS: A, OX3. Moves all extremity. CN grossly ok.       Microbiology  All Micro Results     Procedure Component Value Units Date/Time    C. DIFFICILE AG & TOXIN A/B [[id number]] Collected: 21    Order Status: Completed Specimen: Stool Updated: 21 1537     [address] ANTIGEN Negative        C. difficile toxin Negative        INTERPRETATION       NEGATIVE FOR TOXIGENIC C. DIFFICILE          C. DIFFICILE AG & TOXIN A/B [[id number]] Collected: 21    Order Status: Canceled Specimen: Stool     C. DIFFICILE AG & TOXIN A/B [[id number]] Collected: 21 1830    Order Status: Completed Specimen: Stool Updated: 21     [facility] ANTIGEN Negative        C. difficile toxin Negative        INTERPRETATION       NEGATIVE FOR TOXIGENIC C. DIFFICILE                   Chemistry  Recent Labs     21  0753 21  0510   * 103*    137   K 3.5 3.6    102   CO2 27 27   BUN 12 16   CREA 0.54* 0.45*   CA 7.9* 7.9*   AGAP 7 8   BUCR 22* 36*       CBC w/ Diff  Recent Labs     21  1050 21  0753 21  0510 WBC 20.8* 18.7* 15.6*   RBC 2.73* 2.85* 2.69*   HGB 7.4* 7.6* 7.2*   HCT 22.5* 22.9* 21.8*   * 486* 371   GRANS 88* 71 82*   LYMPH 0* 8* 7*   EOS 2 5 1     Imagin/21 CT AP with drain:  1. Mild bibasilar atelectasis, left greater than right with trace bilateral  effusions. 2. Evidence of recent prior low anterior resection. Oral contrast noted to  extend to the rectum without evidence of extraluminal oral contrast.     > Several areas of somewhat ill-defined fluid noted immediately subjacent to  the lower portion of the midline laparotomy as well as along the left paracolic  gutter without soft tissue gas formation or rim-enhancing wall to suggest formed  abscess. 3. No significant bowel dilatation demonstrated. No morphology of bowel  obstruction.

## 2021-08-24 NOTE — PROGRESS NOTES
Problem: Mobility Impaired (Adult and Pediatric)  Goal: *Acute Goals and Plan of Care (Insert Text)  Description: Physical Therapy short term goals initiated 08/22/21, to be achieved in 3-7 days. Pt will:   1. Perform bed mobility with S in prep for OOB activity. 2. Perform sit <> stand transfers with LRAD and S in prep for functional mobility and ambulation. 3. Ambulate 100 ft with LRAD and S in prep for household and community mobility. 4. Ascend/descend >2 stairs with 1 HR and S for safe home entry. Outcome: Progressing Towards Goal   PHYSICAL THERAPY TREATMENT    Patient: Suzanne Alberto (24 y.o. male)  Date: 8/24/2021  Diagnosis: Diverticulitis of large intestine with abscess [K57.20] <principal problem not specified>  Procedure(s) (LRB):  ROBOTIC ASSIST LOW ANTERIOR RESECTION, MOBILIZATION OF SPLENIC FLEXURE, OMENTAL FLAP, ABSCESS DRAINAGE, CYSTOSCOPY WITH URETERAL STENT PLACEMENT (ERAS PROTOCOL) (N/A) 8 Days Post-Op  Precautions:  PLOF: independent, ambulatory without AD    ASSESSMENT:  No assistance needed for bed mobility or sit to stand. Ambulated 320ft without AD, steady reciprocal gt pattern, no LOB or path deviations. Returned to room and back to bed without assistance. Encouraged pt to ambulate the loop in between units 3x/day. Progression toward goals:   [x]      Improving appropriately and progressing toward goals  []      Improving slowly and progressing toward goals  []      Not making progress toward goals and plan of care will be adjusted     PLAN: stair nego next session  Patient continues to benefit from skilled intervention to address the above impairments. Continue treatment per established plan of care. Discharge Recommendations:  Home Health vs none  Further Equipment Recommendations for Discharge:  N/A     SUBJECTIVE:   Patient stated ok.     OBJECTIVE DATA SUMMARY:   Critical Behavior:  Neurologic State: Alert  Orientation Level: Oriented X4  Cognition: Follows commands  Safety/Judgement: Awareness of environment  Functional Mobility Training:  Bed Mobility:    Supine to Sit: Independent  Sit to Supine: Independent  Scooting: Supervision  Transfers:  Sit to Stand: Independent  Stand to Sit: Independent  Balance:  Sitting: Intact  Standing: Intact; Without support  Standing - Static: Good  Standing - Dynamic : Good   Ambulation/Gait Training:  Distance (ft): 320 Feet (ft)  Assistive Device: Gait belt  Ambulation - Level of Assistance: Independent        Pain:  Pain level pre-treatment: 0/10  Pain level post-treatment: 0/10   Pain Intervention(s): Medication (see MAR); Rest, Ice, Repositioning   Response to intervention: Nurse notified, See doc flow    Activity Tolerance:   Good  Please refer to the flowsheet for vital signs taken during this treatment. After treatment:   [] Patient left in no apparent distress sitting up in chair  [x] Patient left in no apparent distress in bed  [x] Call bell left within reach  [x] Nursing notified  [x] Caregiver present  [] Bed alarm activated  [] SCDs applied      COMMUNICATION/EDUCATION:   [x]         Role of Physical Therapy in the acute care setting. [x]         Fall prevention education was provided and the patient/caregiver indicated understanding. [x]         Patient/family have participated as able in working toward goals and plan of care. [x]         Patient/family agree to work toward stated goals and plan of care. []         Patient understands intent and goals of therapy, but is neutral about his/her participation.   []         Patient is unable to participate in stated goals/plan of care: ongoing with therapy staff.  []         Other:        Jair Rivas PTA   Time Calculation: 12 mins

## 2021-08-24 NOTE — PROGRESS NOTES
Hospitalist Progress Note    Patient: Rudolfo Duverney MRN: [id number]  CSN: [id number]    YOB: 1955  Age: 61 y.o. Sex: male    DOA: 8/16/2021 LOS:  LOS: 8 days            Chief complaint :  Perforated diverticulosis with abscess    Assessment/Plan     Patient Active Problem List   Diagnosis Code    Abscess of sigmoid colon due to diverticulitis K57.20    Diverticulitis of large intestine with abscess K57.20    Diarrhea R19.7    Hypokalemia E87.6    Hypomagnesemia E83.42    Hypophosphatemia E83.39          61 y.o. male with past medical history significant for hypercholesterolemia is admitted by general surgery. He had perforated diverticulum with abscess. Perforated diverticulosis with abscess -   s/p low anterior resection, mobilization of splenic flexure, omental flap, abscess drainage. on ceftriaxone and Flagyl. Cultures showed growth of light E. coli, Enterococcus faecium, Pseudomonas aeruginosa. Patient is allergic to ciprofloxacin. antibiotics Zosyn  Follow WBC,   ID consult appreciated, discussed with Dr. Shantelle Israel  Fluconazole added    Hypokalemia, hypomagnesemia, hypophosphatemia: Check and replace electrolytes. Likely secondary to diarrhea. Diarrhea -   C. difficile negative,    Anemia -  Monitor H/H        Disposition : per primary    Review of systems  General: No fevers or chills. Cardiovascular: No chest pain or pressure. No palpitations. Pulmonary: No shortness of breath. Gastrointestinal: No nausea, vomiting. Physical Exam:  General: Awake, cooperative, no acute distress    HEENT: NC, Atraumatic. PERRLA, anicteric sclerae. Lungs: CTA Bilaterally. No Wheezing/Rhonchi/Rales. Heart:  S1 S2,  No murmur, No Rubs, No Gallops  Abdomen: Soft, Non distended, Non tender.  +Bowel sounds,   Extremities: No c/c/e  Psych:   Not anxious or agitated. Neurologic:  No acute neurological deficit.            Vital signs/Intake and Output:  Visit Vitals  BP (!) 142/58 (BP 1 Location: Left upper arm, BP Patient Position: At rest)   Pulse 96   Temp 98.5 °F (36.9 °C)   Resp 18   Ht 5' 11.46\" (1.815 m)   Wt 98.9 kg (218 lb 0.6 oz)   SpO2 97%   BMI 30.02 kg/m²     Current Shift:  No intake/output data recorded. Last three shifts:  08/23 0701 - 08/24 1900  In: 500 [I.V.:500]  Out: -             Labs: Results:       Chemistry Recent Labs     08/23/21  0753 08/22/21  0510   * 103*    137   K 3.5 3.6    102   CO2 27 27   BUN 12 16   CREA 0.54* 0.45*   CA 7.9* 7.9*   AGAP 7 8   BUCR 22* 36*      CBC w/Diff Recent Labs     08/24/21  1050 08/23/21  0753 08/22/21  0510   WBC 20.8* 18.7* 15.6*   RBC 2.73* 2.85* 2.69*   HGB 7.4* 7.6* 7.2*   HCT 22.5* 22.9* 21.8*   * 486* 371   GRANS 88* 71 82*   LYMPH 0* 8* 7*   EOS 2 5 1      Cardiac Enzymes No results for input(s): CPK, CKND1, JAMI in the last 72 hours. No lab exists for component: CKRMB, TROIP   Coagulation No results for input(s): PTP, INR, APTT, INREXT, INREXT in the last 72 hours. Lipid Panel No results found for: CHOL, CHOLPOCT, CHOLX, CHLST, CHOLV, 546907, HDL, HDLP, LDL, LDLC, DLDLP, 105403, VLDLC, VLDL, TGLX, TRIGL, TRIGP, TGLPOCT, CHHD, CHHDX   BNP No results for input(s): BNPP in the last 72 hours. Liver Enzymes No results for input(s): TP, ALB, TBIL, AP in the last 72 hours.     No lab exists for component: SGOT, GPT, DBIL   Thyroid Studies No results found for: T4, T3U, TSH, TSHEXT, TSHEXT     Procedures/imaging: see electronic medical records for all procedures/Xrays and details which were not copied into this note but were reviewed prior to creation of Plan

## 2021-08-25 LAB
ALBUMIN SERPL-MCNC: 2.1 G/DL (ref 3.4–5)
ALBUMIN/GLOB SERPL: 0.6 {RATIO} (ref 0.8–1.7)
ALP SERPL-CCNC: 87 U/L (ref 45–117)
ALT SERPL-CCNC: 24 U/L (ref 16–61)
ANION GAP SERPL CALC-SCNC: 6 MMOL/L (ref 3–18)
AST SERPL-CCNC: 21 U/L (ref 10–38)
BASOPHILS # BLD: 0 K/UL (ref 0–0.1)
BASOPHILS NFR BLD: 0 % (ref 0–2)
BILIRUB SERPL-MCNC: 0.3 MG/DL (ref 0.2–1)
BUN SERPL-MCNC: 7 MG/DL (ref 7–18)
BUN/CREAT SERPL: 12 (ref 12–20)
CALCIUM SERPL-MCNC: 7.9 MG/DL (ref 8.5–10.1)
CHLORIDE SERPL-SCNC: 103 MMOL/L (ref 100–111)
CO2 SERPL-SCNC: 29 MMOL/L (ref 21–32)
CREAT SERPL-MCNC: 0.57 MG/DL (ref 0.6–1.3)
DIFFERENTIAL METHOD BLD: ABNORMAL
EOSINOPHIL # BLD: 0.4 K/UL (ref 0–0.4)
EOSINOPHIL NFR BLD: 2 % (ref 0–5)
ERYTHROCYTE [DISTWIDTH] IN BLOOD BY AUTOMATED COUNT: 18.1 % (ref 11.6–14.5)
GLOBULIN SER CALC-MCNC: 3.3 G/DL (ref 2–4)
GLUCOSE SERPL-MCNC: 104 MG/DL (ref 74–99)
HCT VFR BLD AUTO: 22.7 % (ref 36–48)
HGB BLD-MCNC: 7.3 G/DL (ref 13–16)
LYMPHOCYTES # BLD: 1.5 K/UL (ref 0.9–3.6)
LYMPHOCYTES NFR BLD: 8 % (ref 21–52)
MCH RBC QN AUTO: 26.7 PG (ref 24–34)
MCHC RBC AUTO-ENTMCNC: 32.2 G/DL (ref 31–37)
MCV RBC AUTO: 83.2 FL (ref 78–100)
MONOCYTES # BLD: 1.7 K/UL (ref 0.05–1.2)
MONOCYTES NFR BLD: 9 % (ref 3–10)
NEUTS SEG # BLD: 14.8 K/UL (ref 1.8–8)
NEUTS SEG NFR BLD: 77 % (ref 40–73)
PLATELET # BLD AUTO: 680 K/UL (ref 135–420)
PMV BLD AUTO: 9.3 FL (ref 9.2–11.8)
POTASSIUM SERPL-SCNC: 3.5 MMOL/L (ref 3.5–5.5)
PROCALCITONIN SERPL-MCNC: 0.17 NG/ML
PROT SERPL-MCNC: 5.4 G/DL (ref 6.4–8.2)
RBC # BLD AUTO: 2.73 M/UL (ref 4.35–5.65)
SODIUM SERPL-SCNC: 138 MMOL/L (ref 136–145)
WBC # BLD AUTO: 19.1 K/UL (ref 4.6–13.2)

## 2021-08-25 PROCEDURE — 74011000258 HC RX REV CODE- 258: Performed by: INTERNAL MEDICINE

## 2021-08-25 PROCEDURE — 74011000250 HC RX REV CODE- 250: Performed by: SURGERY

## 2021-08-25 PROCEDURE — 74011250636 HC RX REV CODE- 250/636: Performed by: INTERNAL MEDICINE

## 2021-08-25 PROCEDURE — 97116 GAIT TRAINING THERAPY: CPT

## 2021-08-25 PROCEDURE — C9113 INJ PANTOPRAZOLE SODIUM, VIA: HCPCS | Performed by: SURGERY

## 2021-08-25 PROCEDURE — 36415 COLL VENOUS BLD VENIPUNCTURE: CPT

## 2021-08-25 PROCEDURE — 74011250637 HC RX REV CODE- 250/637: Performed by: SURGERY

## 2021-08-25 PROCEDURE — 74011250636 HC RX REV CODE- 250/636: Performed by: SURGERY

## 2021-08-25 PROCEDURE — 85025 COMPLETE CBC W/AUTO DIFF WBC: CPT

## 2021-08-25 PROCEDURE — 74011250637 HC RX REV CODE- 250/637: Performed by: HOSPITALIST

## 2021-08-25 PROCEDURE — 84145 PROCALCITONIN (PCT): CPT

## 2021-08-25 PROCEDURE — 74011250637 HC RX REV CODE- 250/637: Performed by: INTERNAL MEDICINE

## 2021-08-25 PROCEDURE — 65270000029 HC RM PRIVATE

## 2021-08-25 PROCEDURE — 80053 COMPREHEN METABOLIC PANEL: CPT

## 2021-08-25 RX ORDER — FLUCONAZOLE 100 MG/1
300 TABLET ORAL ONCE
Status: DISCONTINUED | OUTPATIENT
Start: 2021-08-25 | End: 2021-08-25

## 2021-08-25 RX ORDER — HEPARIN SODIUM 5000 [USP'U]/ML
5000 INJECTION, SOLUTION INTRAVENOUS; SUBCUTANEOUS EVERY 8 HOURS
Status: DISCONTINUED | OUTPATIENT
Start: 2021-08-25 | End: 2021-08-26

## 2021-08-25 RX ADMIN — HEPARIN SODIUM 5000 UNITS: 5000 INJECTION INTRAVENOUS; SUBCUTANEOUS at 22:21

## 2021-08-25 RX ADMIN — PIPERACILLIN AND TAZOBACTAM 3.38 G: 3; .375 INJECTION, POWDER, LYOPHILIZED, FOR SOLUTION INTRAVENOUS at 01:34

## 2021-08-25 RX ADMIN — Medication 500 MG: at 09:29

## 2021-08-25 RX ADMIN — Medication 10 ML: at 13:36

## 2021-08-25 RX ADMIN — PIPERACILLIN AND TAZOBACTAM 3.38 G: 3; .375 INJECTION, POWDER, LYOPHILIZED, FOR SOLUTION INTRAVENOUS at 13:35

## 2021-08-25 RX ADMIN — HEPARIN SODIUM 5000 UNITS: 5000 INJECTION INTRAVENOUS; SUBCUTANEOUS at 15:33

## 2021-08-25 RX ADMIN — FLUCONAZOLE 400 MG: 100 TABLET ORAL at 09:29

## 2021-08-25 RX ADMIN — ATORVASTATIN CALCIUM 10 MG: 10 TABLET, FILM COATED ORAL at 09:30

## 2021-08-25 RX ADMIN — Medication 500 MG: at 22:21

## 2021-08-25 RX ADMIN — HEPARIN SODIUM 5000 UNITS: 5000 INJECTION INTRAVENOUS; SUBCUTANEOUS at 07:29

## 2021-08-25 RX ADMIN — ACETAMINOPHEN 1000 MG: 500 TABLET ORAL at 09:29

## 2021-08-25 RX ADMIN — ACETAMINOPHEN 1000 MG: 500 TABLET ORAL at 15:33

## 2021-08-25 RX ADMIN — ACETAMINOPHEN 1000 MG: 500 TABLET ORAL at 22:21

## 2021-08-25 RX ADMIN — GABAPENTIN 100 MG: 100 CAPSULE ORAL at 09:30

## 2021-08-25 RX ADMIN — GABAPENTIN 100 MG: 100 CAPSULE ORAL at 15:33

## 2021-08-25 RX ADMIN — PIPERACILLIN AND TAZOBACTAM 3.38 G: 3; .375 INJECTION, POWDER, LYOPHILIZED, FOR SOLUTION INTRAVENOUS at 07:30

## 2021-08-25 RX ADMIN — AMLODIPINE BESYLATE 5 MG: 5 TABLET ORAL at 09:30

## 2021-08-25 RX ADMIN — GABAPENTIN 100 MG: 100 CAPSULE ORAL at 22:21

## 2021-08-25 RX ADMIN — Medication 10 ML: at 22:00

## 2021-08-25 RX ADMIN — PIPERACILLIN AND TAZOBACTAM 3.38 G: 3; .375 INJECTION, POWDER, LYOPHILIZED, FOR SOLUTION INTRAVENOUS at 22:20

## 2021-08-25 RX ADMIN — SODIUM CHLORIDE 40 MG: 9 INJECTION INTRAMUSCULAR; INTRAVENOUS; SUBCUTANEOUS at 09:30

## 2021-08-25 NOTE — PROGRESS NOTES
1900 Bedside shift report received from [address] included KARDEX, MAR, OR summary and Intake and output.

## 2021-08-25 NOTE — PROGRESS NOTES
D/C Plan: [facility] and physician follow up     Noted pt is not medically stable at this time to transition from an acute care setting. Pt has CT planned for Friday. ID has recommended oral ABX upon discharge. CM to continue to follow and assist.    Care Management Interventions  PCP Verified by CM: Yes  Palliative Care Criteria Met (RRAT>21 & CHF Dx)?: No  Mode of Transport at Discharge:  Other (see comment) (Family)  Transition of Care Consult (CM Consult): Discharge Planning, [address]: Yes  Health Maintenance Reviewed: Yes  Physical Therapy Consult: Yes  Occupational Therapy Consult: Yes  Current Support Network: Lives with Spouse  Confirm Follow Up Transport: Family  The Plan for Transition of Care is Related to the Following Treatment Goals : [facility]ALILEO DA[facility] and physician follow up   The Patient and/or Patient Representative was Provided with a Choice of Provider and Agrees with the Discharge Plan?: Yes  Name of the Patient Representative Who was Provided with a Choice of Provider and Agrees with the Discharge Plan: pt  Freedom of Choice List was Provided with Basic Dialogue that Supports the Patient's Individualized Plan of Care/Goals, Treatment Preferences and Shares the Quality Data Associated with the Providers?: Yes  Discharge Location  Discharge Placement: Home with home health [facility])

## 2021-08-25 NOTE — PROGRESS NOTES
Problem: Mobility Impaired (Adult and Pediatric)  Goal: *Acute Goals and Plan of Care (Insert Text)  Description: Physical Therapy short term goals initiated 08/22/21, to be achieved in 3-7 days. Pt will:   1. Perform bed mobility with S in prep for OOB activity. 2. Perform sit <> stand transfers with LRAD and S in prep for functional mobility and ambulation. 3. Ambulate 100 ft with LRAD and S in prep for household and community mobility. 4. Ascend/descend >2 stairs with 1 HR and S for safe home entry. Outcome: Progressing Towards Goal   PHYSICAL THERAPY TREATMENT    Patient: Clara Waterman (50 y.o. male)  Date: 8/25/2021  Diagnosis: Diverticulitis of large intestine with abscess [K57.20] <principal problem not specified>  Procedure(s) (LRB):  ROBOTIC ASSIST LOW ANTERIOR RESECTION, MOBILIZATION OF SPLENIC FLEXURE, OMENTAL FLAP, ABSCESS DRAINAGE, CYSTOSCOPY WITH URETERAL STENT PLACEMENT (ERAS PROTOCOL) (N/A) 9 Days Post-Op  Precautions:   PLOF: independent, ambulatory without AD    ASSESSMENT:  No assistance needed for bed mobility or sit to stand. Ambulated 420ft without AD, steady reciprocal gt pattern, no LOB or path deviations. Returned to room and left siting EOB to eat lunch. Progression toward goals:   [x]      Improving appropriately and progressing toward goals  []      Improving slowly and progressing toward goals  []      Not making progress toward goals and plan of care will be adjusted     PLAN:  Patient continues to benefit from skilled intervention to address the above impairments. Continue treatment per established plan of care. Discharge Recommendations:  None  Further Equipment Recommendations for Discharge:  N/A     SUBJECTIVE:   Patient stated I have been walking here in the room.     OBJECTIVE DATA SUMMARY:   Critical Behavior:  Neurologic State: Alert  Orientation Level: Oriented X4  Cognition: Appropriate decision making, Appropriate for age attention/concentration, Appropriate safety awareness, Follows commands  Safety/Judgement: Awareness of environment  Functional Mobility Training:  Bed Mobility:    Supine to Sit: Independent  Sit to Supine: Independent  Transfers:  Sit to Stand: Independent  Stand to Sit: Independent  Balance:  Sitting: Intact  Standing: Intact; Without support  Standing - Static: Good  Standing - Dynamic : Good    Ambulation/Gait Training:  Distance (ft): 420 Feet (ft)    Ambulation - Level of Assistance: Independent        Pain:  Pain level pre-treatment: 0/10  Pain level post-treatment: 0/10   Pain Intervention(s): Medication (see MAR); Rest, Ice, Repositioning   Response to intervention: Nurse notified, See doc flow    Activity Tolerance:   Good  Please refer to the flowsheet for vital signs taken during this treatment. After treatment:   [] Patient left in no apparent distress sitting up in chair  [x] Patient left in no apparent distress in bed  [x] Call bell left within reach  [] Nursing notified  [] Caregiver present  [] Bed alarm activated  [] SCDs applied      COMMUNICATION/EDUCATION:   [x]         Role of Physical Therapy in the acute care setting. [x]         Fall prevention education was provided and the patient/caregiver indicated understanding. [x]         Patient/family have participated as able in working toward goals and plan of care. [x]         Patient/family agree to work toward stated goals and plan of care. []         Patient understands intent and goals of therapy, but is neutral about his/her participation.   []         Patient is unable to participate in stated goals/plan of care: ongoing with therapy staff.  []         Other:        Pranay Sanders PTA   Time Calculation: 10 mins

## 2021-08-25 NOTE — PROGRESS NOTES
Patient was educated on getting out of bed slowly. Patient was educated on using call bell before getting out of bed.

## 2021-08-25 NOTE — PROGRESS NOTES
Bedside and verbal shift change report recieved from [facility] ([facility]) (offgoing nurse) given to Lenny Tay RN (oncoming nurse). Report included the following information SBAR, Kardex, Intake/Output, MAR and Recent Results       Bedside and verbal shift change report given to [facility] ([facility]) (oncoming nurse) by Kahlil Bustillos RN (offgoing nurse).  Report included the following information SBAR, Kardex, Intake/Output, MAR and Recent Results

## 2021-08-25 NOTE — PROGRESS NOTES
Progress Note    Patient: Noam Brito MRN: [id number]  CSN: [id number]    YOB: 1955  Age: 61 y.o. Sex: male    DOA: 8/16/2021 LOS:  LOS: 8 days                    Subjective:     Patient states feels better - continues with diarrhea. Doing much better with ambulation and IS. Objective:      Visit Vitals  BP (!) 151/74 (BP 1 Location: Left upper arm, BP Patient Position: At rest)   Pulse 100   Temp 98 °F (36.7 °C)   Resp 17   Ht 5' 11.46\" (1.815 m)   Wt 98.9 kg (218 lb 0.6 oz)   SpO2 96%   BMI 30.02 kg/m²       Physical Exam:  General appearance: Alert, no distress  Lungs: clear to auscultation bilaterally  Heart: regular rate and rhythm,   Abdomen: soft, appropriate tenderness, non distended  Extremities: extremities normal, atraumatic, no cyanosis or edema, calfs non-tender  Skin: Skin color, texture, turgor normal. No rashes or lesions  Psych: Alert, oriented x3, appropriate    Intake and Output:  Current Shift:  No intake/output data recorded. Last three shifts:  08/23 0701 - 08/24 1900  In: 500 [I.V.:500]  Out: -     Lab/Data Reviewed:  BMP: No results found for: NA, K, CL, CO2, AGAP, GLU, BUN, CREA, GFRAA, GFRNA  CBC:   Lab Results   Component Value Date/Time    WBC 20.8 (H) 08/24/2021 10:50 AM    HGB 7.4 (L) 08/24/2021 10:50 AM    HCT 22.5 (L) 08/24/2021 10:50 AM     (H) 08/24/2021 10:50 AM       Medications Reviewed. Assessment/Plan     Patient Active Problem List   Diagnosis Code    Abscess of sigmoid colon due to diverticulitis K57.20    Diverticulitis of large intestine with abscess K57.20    Diarrhea R19.7    Hypokalemia E87.6    Hypomagnesemia E83.42    Hypophosphatemia E83.39       Clinically looks better despite no improvement of labs. Appreciate ID and hospitalist input. Agree with repeat CT CAP on Friday if no improvement to search for abscess or other occult abd pathology as well as chest.  DC reglan.

## 2021-08-25 NOTE — PROGRESS NOTES
[facility]  (A Division of [address] [place] [address])    Follow-up Note      Date of Admission: 8/16/2021       Date of Note:  8/25/2021    Summary:    Mr. Stanford Camacho is 60 YO WHITE/NON- male with history of complicated diverticulitis with perforation in July. Manged conservatively with drain and oral abx, didn't respond well and brought and underwent abd surgery on 8/16/21. Culture from abd drainage in July + Pseudomnas, E.coli and E.fecium. Initial abx of Cipro and flagyl PO as OP, and cipro changed to Bactrim due to side effect.      He had a drain placed after OR on 8/16 and was on Ceftriaxone and Flagyl when he started to have wbc go up to 19K. Tested for C.diff X2, ABX changed to zosyn over last weekend. But wbc still inclining up. 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 IS doing better. Flatulence last night - some stool passing with it, but was able to control it. Still not real hungry. No pain. Current Antimicrobials:    Prior Antimicrobials:  1. Pip/tzb IV (8/21-) #4  2. Fluconazole PO (8/24-) #1 1. CAX IV (8/16-20)  2. Metronidazole IV (8/16-21)       Assessment: Rec / Plan:   Sigmoid Diverticulosis with perforation  Better. Pt looks better and WBC subsets all improving despite overall high total number; look at the neutrophil/lymphocyte subsets -- better. Keep pushing.  ->Pip/tzb #4 and Fluc #1    Keep pushing IV until overall leukocytosis much better, then can transition to PO to finish 2-3wks    Diverticulosis    HTN                Microbiology:  8/21 - CDT (-)     8/19 - CDT (-)       7/16 - ABD fluid (+) E coli (S all), E coli (R amp), P aeruginosa (S all), and Enterococcus faecium (S amp/vanco)      Lines / Catheters: peripheral        Patient Active Problem List   Diagnosis Code    Abscess of sigmoid colon due to diverticulitis K57.20    Diverticulitis of large intestine with abscess K57.20    Diarrhea R19.7    Hypokalemia E87.6    Hypomagnesemia E83.42    Hypophosphatemia E83.39       Current Facility-Administered Medications   Medication Dose Route Frequency    heparin (porcine) injection 5,000 Units  5,000 Units SubCUTAneous Q8H    fluconazole (DIFLUCAN) tablet 300 mg  300 mg Oral ONCE    fluconazole (DIFLUCAN) tablet 400 mg  400 mg Oral DAILY    piperacillin-tazobactam (ZOSYN) 3.375 g in 0.9% sodium chloride (MBP/ADV) 100 mL MBP  3.375 g IntraVENous Q6H    pantoprazole (PROTONIX) 40 mg in 0.9% sodium chloride 10 mL injection  40 mg IntraVENous DAILY    Saccharomyces boulardii (FLORASTOR) capsule 500 mg  500 mg Oral BID    amLODIPine (NORVASC) tablet 5 mg  5 mg Oral DAILY    atorvastatin (LIPITOR) tablet 10 mg  10 mg Oral DAILY    sodium chloride (NS) flush 5-40 mL  5-40 mL IntraVENous Q8H    sodium chloride (NS) flush 5-40 mL  5-40 mL IntraVENous PRN    magnesium sulfate 2 g/50 ml IVPB (premix or compounded)  2 g IntraVENous PRN    acetaminophen (TYLENOL) tablet 1,000 mg  1,000 mg Oral TID    morphine injection 2 mg  2 mg IntraVENous Q2H PRN    gabapentin (NEURONTIN) capsule 100 mg  100 mg Oral TID         Review of Systems - General ROS: negative for - chills, fever or night sweats  Respiratory ROS: no cough, shortness of breath, or wheezing  Cardiovascular ROS: no chest pain or dyspnea on exertion  Gastrointestinal ROS: no abdominal pain, change in bowel habits, or black or bloody stools       Objective:    Visit Vitals  BP (!) 145/62 (BP 1 Location: Left upper arm, BP Patient Position: At rest)   Pulse 98   Temp 98.4 °F (36.9 °C)   Resp 16   Ht 5' 11.46\" (1.815 m)   Wt 98.9 kg (218 lb 0.6 oz)   SpO2 95%   BMI 30.02 kg/m²       Temp (24hrs), Av.4 °F (36.9 °C), Min:97.8 °F (36.6 °C), Max:99 °F (37.2 °C)      GEN: WDWN WM in NAD  HEENT: anicteric  CHEST: CTA  CVS:RRR  ABD: NT hypoactive, but present BS  EXT: no edema         Lab results:    Chemistry  Recent Labs     08/25/21  0240 08/23/21  0753   * 117*    138   K 3.5 3.5    104   CO2 29 27   BUN 7 12   CREA 0.57* 0.54*   CA 7.9* 7.9*   AGAP 6 7   BUCR 12 22*   AP 87  --    TP 5.4*  --    ALB 2.1*  --    GLOB 3.3  --    AGRAT 0.6*  --        CBC w/ Diff  Recent Labs     08/25/21  0240 08/24/21  1050 08/23/21  0753   WBC 19.1* 20.8* 18.7*   RBC 2.73* 2.73* 2.85*   HGB 7.3* 7.4* 7.6*   HCT 22.7* 22.5* 22.9*   * 595* 486*   GRANS 77* 88* 71   LYMPH 8* 0* 8*   EOS 2 2 5       Microbiology  All Micro Results     Procedure Component Value Units Date/Time    C. DIFFICILE AG & TOXIN A/B [[id number]] Collected: 08/21/21 2200    Order Status: Completed Specimen: Stool Updated: 08/22/21 1537     [address] ANTIGEN Negative        C. difficile toxin Negative        INTERPRETATION       NEGATIVE FOR TOXIGENIC C. DIFFICILE          C. DIFFICILE AG & TOXIN A/B [[id number]] Collected: 08/21/21 2200    Order Status: Canceled Specimen: Stool     C. DIFFICILE AG & TOXIN A/B [[id number]] Collected: 08/19/21 1830    Order Status: Completed Specimen: Stool Updated: 08/19/21 2222     [address] ANTIGEN Negative        C. difficile toxin Negative        INTERPRETATION       NEGATIVE FOR TOXIGENIC C. DIFFICILE                 Paige Camacho MD  cell [phone]  [facility]   8/25/2021   10:01 AM

## 2021-08-25 NOTE — PROGRESS NOTES
Hospitalist Progress Note    Patient: Blanche Fragoso MRN: [id number]  CSN: [id number]    YOB: 1955  Age: 61 y.o. Sex: male    DOA: 8/16/2021 LOS:  LOS: 9 days            Chief complaint :  Perforated diverticulosis with abscess    Assessment/Plan     Patient Active Problem List   Diagnosis Code    Abscess of sigmoid colon due to diverticulitis K57.20    Diverticulitis of large intestine with abscess K57.20    Diarrhea R19.7    Hypokalemia E87.6    Hypomagnesemia E83.42    Hypophosphatemia E83.39          61 y.o. male with past medical history significant for hypercholesterolemia is admitted by general surgery. He had perforated diverticulum with abscess. Perforated diverticulosis with abscess -   s/p low anterior resection, mobilization of splenic flexure, omental flap, abscess drainage. on ceftriaxone and Flagyl. Cultures showed growth of light E. coli, Enterococcus faecium, Pseudomonas aeruginosa. Patient is allergic to ciprofloxacin. antibiotics Zosyn  Follow WBC,   ID consult appreciated, discussed with Dr. Kenya Proctor  Fluconazole added    Hypokalemia, hypomagnesemia, hypophosphatemia: Check and replace electrolytes. Likely secondary to diarrhea. Diarrhea -   C. difficile negative,    Anemia -  Monitor H/H    Disposition : per primary    Review of systems  General: No fevers or chills. Cardiovascular: No chest pain or pressure. No palpitations. Pulmonary: No shortness of breath. Gastrointestinal: No nausea, vomiting. Physical Exam:  General: Awake, cooperative, no acute distress    HEENT: NC, Atraumatic. PERRLA, anicteric sclerae. Lungs: CTA Bilaterally. No Wheezing/Rhonchi/Rales. Heart:  S1 S2,  No murmur, No Rubs, No Gallops  Abdomen: Soft, Non distended, Non tender.  +Bowel sounds,   Extremities: No c/c/e  Psych:   Not anxious or agitated. Neurologic:  No acute neurological deficit.            Vital signs/Intake and Output:  Visit Vitals  BP (!) 145/62 (BP 1 Location: Left upper arm, BP Patient Position: At rest)   Pulse 98   Temp 98.4 °F (36.9 °C)   Resp 16   Ht 5' 11.46\" (1.815 m)   Wt 98.9 kg (218 lb 0.6 oz)   SpO2 95%   BMI 30.02 kg/m²     Current Shift:  No intake/output data recorded. Last three shifts:  08/23 1901 - 08/25 0700  In: 500 [I.V.:500]  Out: -             Labs: Results:       Chemistry Recent Labs     08/25/21  0240 08/23/21  0753   * 117*    138   K 3.5 3.5    104   CO2 29 27   BUN 7 12   CREA 0.57* 0.54*   CA 7.9* 7.9*   AGAP 6 7   BUCR 12 22*   AP 87  --    TP 5.4*  --    ALB 2.1*  --    GLOB 3.3  --    AGRAT 0.6*  --       CBC w/Diff Recent Labs     08/25/21  0240 08/24/21  1050 08/23/21  0753   WBC 19.1* 20.8* 18.7*   RBC 2.73* 2.73* 2.85*   HGB 7.3* 7.4* 7.6*   HCT 22.7* 22.5* 22.9*   * 595* 486*   GRANS 77* 88* 71   LYMPH 8* 0* 8*   EOS 2 2 5      Cardiac Enzymes No results for input(s): CPK, CKND1, JAMI in the last 72 hours. No lab exists for component: CKRMB, TROIP   Coagulation No results for input(s): PTP, INR, APTT, INREXT, INREXT in the last 72 hours. Lipid Panel No results found for: CHOL, CHOLPOCT, CHOLX, CHLST, CHOLV, 750537, HDL, HDLP, LDL, LDLC, DLDLP, 081501, VLDLC, VLDL, TGLX, TRIGL, TRIGP, TGLPOCT, CHHD, CHHDX   BNP No results for input(s): BNPP in the last 72 hours.    Liver Enzymes Recent Labs     08/25/21  0240   TP 5.4*   ALB 2.1*   AP 87      Thyroid Studies No results found for: T4, T3U, TSH, TSHEXT, TSHEXT     Procedures/imaging: see electronic medical records for all procedures/Xrays and details which were not copied into this note but were reviewed prior to creation of Plan

## 2021-08-25 NOTE — PROGRESS NOTES
AFVSS  Feels good  Abd - benign    WBC down some today. IV abx per ID  Will get CT Friday if indicated.   Right now if he continues to improve hope to D/C if OK with ID.

## 2021-08-25 NOTE — PROGRESS NOTES
Physician Progress Note      PATIENTBobbette Hodgkin  [facility]:                  [id number]  :                       1955  ADMIT DATE:       2021 5:06 AM  DISCH DATE:  RESPONDING  PROVIDER #:        Thais Dougherty MD          QUERY TEXT:    Pt admitted with Diverticulitis of large intestine with abscess. Pt noted to have Leukocytosis with elevated WBC. If possible, please document in the progress notes and discharge summary if you are evaluating and /or treating any of the following: The medical record reflects the following:    Risk Factors: Diverticulitis of large intestine with abscess    Clinical Indicators:  > WBC  2021 02:30 WBC: 9.6  2021 04:38 WBC: 15.1 (H)  2021 03:50 WBC: 18.8 (H)  2021 13:52 WBC: 19.4 (H)  2021 05:16 WBC: 14.8 (H)  2021 05:10 WBC: 15.6 (H)  2021 07:53 WBC: 18.7 (H)  2021 10:50 WBC: 20.8 (H)  2021 02:40 WBC: 19.1 (H)  > Procalcitonin pending  > HR 94, 95, 96,98, 100, 102  > /54, 109/50, 124/44  > Per ID - If wbc remains up-- consideration would be to re-scan for FU of abd loculation and aspiration for fluid gram stain and culture. Treatment: receiving ID, CBC, procalcitonin-pending, zosyn    Thank you,  Brianna Jimenez, RN, BSN, [place], [address] [facility]  Options provided:  -- Sepsis, present on admission  -- Sepsis, not present on admission  -- Diverticulitis of large intestine with abscess without Sepsis  -- Other - I will add my own diagnosis  -- Disagree - Not applicable / Not valid  -- Disagree - Clinically unable to determine / Unknown  -- Refer to Clinical Documentation Reviewer    PROVIDER RESPONSE TEXT:    Provider disagreed with this query. I was not involved in this patient at the time of admission.  please refer to admitting attending    Query created by: Richard Lopez on 2021 8:10 AM      Electronically signed by:  Thais Doguherty MD 2021 8:14 AM

## 2021-08-26 PROBLEM — K92.2 GI BLEED: Status: ACTIVE | Noted: 2021-08-26

## 2021-08-26 PROBLEM — D64.9 ANEMIA: Status: ACTIVE | Noted: 2021-08-26

## 2021-08-26 LAB
BASOPHILS # BLD: 0 K/UL (ref 0–0.1)
BASOPHILS NFR BLD: 0 % (ref 0–2)
DIFFERENTIAL METHOD BLD: ABNORMAL
EOSINOPHIL # BLD: 0.3 K/UL (ref 0–0.4)
EOSINOPHIL NFR BLD: 2 % (ref 0–5)
ERYTHROCYTE [DISTWIDTH] IN BLOOD BY AUTOMATED COUNT: 18.4 % (ref 11.6–14.5)
FERRITIN SERPL-MCNC: 335 NG/ML (ref 8–388)
FOLATE SERPL-MCNC: 4.8 NG/ML (ref 3.1–17.5)
HCT VFR BLD AUTO: 20 % (ref 36–48)
HCT VFR BLD AUTO: 23.4 % (ref 36–48)
HEMOCCULT STL QL: POSITIVE
HGB BLD-MCNC: 6.4 G/DL (ref 13–16)
HGB BLD-MCNC: 7.6 G/DL (ref 13–16)
HISTORY CHECKED?,CKHIST: NORMAL
IRON SATN MFR SERPL: 7 % (ref 20–50)
IRON SERPL-MCNC: 14 UG/DL (ref 50–175)
LYMPHOCYTES # BLD: 1.4 K/UL (ref 0.9–3.6)
LYMPHOCYTES NFR BLD: 8 % (ref 21–52)
MAGNESIUM SERPL-MCNC: 2.3 MG/DL (ref 1.6–2.6)
MCH RBC QN AUTO: 27.7 PG (ref 24–34)
MCHC RBC AUTO-ENTMCNC: 32 G/DL (ref 31–37)
MCV RBC AUTO: 86.6 FL (ref 78–100)
MONOCYTES # BLD: 0.9 K/UL (ref 0.05–1.2)
MONOCYTES NFR BLD: 5 % (ref 3–10)
NEUTS BAND NFR BLD MANUAL: 1 % (ref 0–5)
NEUTS SEG # BLD: 14.4 K/UL (ref 1.8–8)
NEUTS SEG NFR BLD: 84 % (ref 40–73)
PLATELET # BLD AUTO: 823 K/UL (ref 135–420)
PLATELET COMMENTS,PCOM: ABNORMAL
PMV BLD AUTO: 9 FL (ref 9.2–11.8)
RBC # BLD AUTO: 2.31 M/UL (ref 4.35–5.65)
RBC MORPH BLD: ABNORMAL
RBC MORPH BLD: ABNORMAL
RETICS/RBC NFR AUTO: 5.4 % (ref 0.5–2.5)
TIBC SERPL-MCNC: 199 UG/DL (ref 250–450)
VIT B12 SERPL-MCNC: 626 PG/ML (ref 211–911)
WBC # BLD AUTO: 17 K/UL (ref 4.6–13.2)

## 2021-08-26 PROCEDURE — 86677 HELICOBACTER PYLORI ANTIBODY: CPT

## 2021-08-26 PROCEDURE — 85025 COMPLETE CBC W/AUTO DIFF WBC: CPT

## 2021-08-26 PROCEDURE — 83735 ASSAY OF MAGNESIUM: CPT

## 2021-08-26 PROCEDURE — 74011250636 HC RX REV CODE- 250/636: Performed by: SURGERY

## 2021-08-26 PROCEDURE — 74011000258 HC RX REV CODE- 258: Performed by: INTERNAL MEDICINE

## 2021-08-26 PROCEDURE — 86901 BLOOD TYPING SEROLOGIC RH(D): CPT

## 2021-08-26 PROCEDURE — 82272 OCCULT BLD FECES 1-3 TESTS: CPT

## 2021-08-26 PROCEDURE — 2709999900 HC NON-CHARGEABLE SUPPLY

## 2021-08-26 PROCEDURE — 74011250636 HC RX REV CODE- 250/636: Performed by: INTERNAL MEDICINE

## 2021-08-26 PROCEDURE — 36415 COLL VENOUS BLD VENIPUNCTURE: CPT

## 2021-08-26 PROCEDURE — 83550 IRON BINDING TEST: CPT

## 2021-08-26 PROCEDURE — 85045 AUTOMATED RETICULOCYTE COUNT: CPT

## 2021-08-26 PROCEDURE — 82746 ASSAY OF FOLIC ACID SERUM: CPT

## 2021-08-26 PROCEDURE — 74011250637 HC RX REV CODE- 250/637: Performed by: INTERNAL MEDICINE

## 2021-08-26 PROCEDURE — 74011000258 HC RX REV CODE- 258: Performed by: HOSPITALIST

## 2021-08-26 PROCEDURE — 74011000250 HC RX REV CODE- 250: Performed by: SURGERY

## 2021-08-26 PROCEDURE — 30233N1 TRANSFUSION OF NONAUTOLOGOUS RED BLOOD CELLS INTO PERIPHERAL VEIN, PERCUTANEOUS APPROACH: ICD-10-PCS | Performed by: SURGERY

## 2021-08-26 PROCEDURE — 36430 TRANSFUSION BLD/BLD COMPNT: CPT

## 2021-08-26 PROCEDURE — 82728 ASSAY OF FERRITIN: CPT

## 2021-08-26 PROCEDURE — 74011250637 HC RX REV CODE- 250/637: Performed by: SURGERY

## 2021-08-26 PROCEDURE — P9016 RBC LEUKOCYTES REDUCED: HCPCS

## 2021-08-26 PROCEDURE — 85014 HEMATOCRIT: CPT

## 2021-08-26 PROCEDURE — 74011250636 HC RX REV CODE- 250/636: Performed by: HOSPITALIST

## 2021-08-26 PROCEDURE — 74011250637 HC RX REV CODE- 250/637: Performed by: HOSPITALIST

## 2021-08-26 PROCEDURE — C9113 INJ PANTOPRAZOLE SODIUM, VIA: HCPCS | Performed by: SURGERY

## 2021-08-26 PROCEDURE — 86923 COMPATIBILITY TEST ELECTRIC: CPT

## 2021-08-26 PROCEDURE — 65270000029 HC RM PRIVATE

## 2021-08-26 RX ORDER — SODIUM CHLORIDE 9 MG/ML
250 INJECTION, SOLUTION INTRAVENOUS AS NEEDED
Status: DISCONTINUED | OUTPATIENT
Start: 2021-08-26 | End: 2021-08-27 | Stop reason: HOSPADM

## 2021-08-26 RX ADMIN — Medication 500 MG: at 08:18

## 2021-08-26 RX ADMIN — ATORVASTATIN CALCIUM 10 MG: 10 TABLET, FILM COATED ORAL at 08:19

## 2021-08-26 RX ADMIN — Medication 500 MG: at 22:10

## 2021-08-26 RX ADMIN — AMLODIPINE BESYLATE 5 MG: 5 TABLET ORAL at 08:19

## 2021-08-26 RX ADMIN — FLUCONAZOLE 400 MG: 100 TABLET ORAL at 08:18

## 2021-08-26 RX ADMIN — ACETAMINOPHEN 1000 MG: 500 TABLET ORAL at 08:18

## 2021-08-26 RX ADMIN — PIPERACILLIN AND TAZOBACTAM 3.38 G: 3; .375 INJECTION, POWDER, LYOPHILIZED, FOR SOLUTION INTRAVENOUS at 01:09

## 2021-08-26 RX ADMIN — ACETAMINOPHEN 1000 MG: 500 TABLET ORAL at 15:04

## 2021-08-26 RX ADMIN — Medication 10 ML: at 22:00

## 2021-08-26 RX ADMIN — SODIUM CHLORIDE 40 MG: 9 INJECTION INTRAMUSCULAR; INTRAVENOUS; SUBCUTANEOUS at 08:18

## 2021-08-26 RX ADMIN — IRON SUCROSE 200 MG: 20 INJECTION, SOLUTION INTRAVENOUS at 22:11

## 2021-08-26 RX ADMIN — Medication 10 ML: at 15:04

## 2021-08-26 RX ADMIN — PIPERACILLIN AND TAZOBACTAM 3.38 G: 3; .375 INJECTION, POWDER, LYOPHILIZED, FOR SOLUTION INTRAVENOUS at 06:42

## 2021-08-26 RX ADMIN — GABAPENTIN 100 MG: 100 CAPSULE ORAL at 22:11

## 2021-08-26 RX ADMIN — GABAPENTIN 100 MG: 100 CAPSULE ORAL at 08:19

## 2021-08-26 RX ADMIN — PIPERACILLIN AND TAZOBACTAM 3.38 G: 3; .375 INJECTION, POWDER, LYOPHILIZED, FOR SOLUTION INTRAVENOUS at 15:04

## 2021-08-26 RX ADMIN — GABAPENTIN 100 MG: 100 CAPSULE ORAL at 15:04

## 2021-08-26 RX ADMIN — HEPARIN SODIUM 5000 UNITS: 5000 INJECTION INTRAVENOUS; SUBCUTANEOUS at 06:43

## 2021-08-26 RX ADMIN — PIPERACILLIN AND TAZOBACTAM 3.38 G: 3; .375 INJECTION, POWDER, LYOPHILIZED, FOR SOLUTION INTRAVENOUS at 18:06

## 2021-08-26 RX ADMIN — ACETAMINOPHEN 1000 MG: 500 TABLET ORAL at 22:10

## 2021-08-26 NOTE — PROGRESS NOTES
1925 Bedside shift report received from [facility]. Report included KARDEX, MAR, and Intake and output.

## 2021-08-26 NOTE — PROGRESS NOTES
AFVSS  Abd - benign    H/H down below 7. Will need tranfusion.   WBC coming down    Stop Heparin  IV abx per ID

## 2021-08-26 NOTE — PROGRESS NOTES
Problem: Falls - Risk of  Goal: *Absence of Falls  Description: Document [place] Fall Risk and appropriate interventions in the flowsheet.   Outcome: Progressing Towards Goal  Note: Fall Risk Interventions:  Mobility Interventions: Assess mobility with egress test    Mentation Interventions: Adequate sleep, hydration, pain control    Medication Interventions: Patient to call before getting OOB    Elimination Interventions: Call light in reach, Patient to call for help with toileting needs

## 2021-08-26 NOTE — PROGRESS NOTES
Hospitalist Progress Note    Patient: Brice Collins MRN: [id number]  CSN: [id number]    YOB: 1955  Age: 61 y.o. Sex: male    DOA: 8/16/2021 LOS:  LOS: 10 days            Chief complaint :  Perforated diverticulosis with abscess    Assessment/Plan     Patient Active Problem List   Diagnosis Code    Abscess of sigmoid colon due to diverticulitis K57.20    Diverticulitis of large intestine with abscess K57.20    Diarrhea R19.7    Hypokalemia E87.6    Hypomagnesemia E83.42    Hypophosphatemia E83.39    Anemia D64.9    GI bleed K92.2          61 y.o. male with past medical history significant for hypercholesterolemia is admitted by general surgery. He had perforated diverticulum with abscess. Perforated diverticulosis with abscess -   s/p low anterior resection, mobilization of splenic flexure, omental flap, abscess drainage. on ceftriaxone and Flagyl. Cultures showed growth of light E. coli, Enterococcus faecium, Pseudomonas aeruginosa. Patient is allergic to ciprofloxacin. antibiotics Zosyn  Follow WBC,   ID consult appreciated, discussed with Dr. Leandro Fonseca  Fluconazole added    Hypokalemia, hypomagnesemia, hypophosphatemia: Check and replace electrolytes. Likely secondary to diarrhea. Diarrhea -   C. difficile negative,    Anemia -  Blood transfusion ordered  Iron panel with low iron, venofer ordered. GI bleed -  Stool for occult blood positive. Continue with protonix  GI consult. Disposition : per primary    Review of systems  General: No fevers or chills. Cardiovascular: No chest pain or pressure. No palpitations. Pulmonary: No shortness of breath. Gastrointestinal: No nausea, vomiting. Physical Exam:  General: Awake, cooperative, no acute distress    HEENT: NC, Atraumatic. PERRLA, anicteric sclerae. Lungs: CTA Bilaterally. No Wheezing/Rhonchi/Rales.   Heart:  S1 S2,  No murmur, No Rubs, No Gallops  Abdomen: Soft, Non distended, Non tender.  +Bowel sounds, Extremities: No c/c/e  Psych:   Not anxious or agitated. Neurologic:  No acute neurological deficit. Vital signs/Intake and Output:  Visit Vitals  BP (!) 154/56 (BP 1 Location: Left arm, BP Patient Position: At rest)   Pulse 96   Temp 99.1 °F (37.3 °C)   Resp 18   Ht 5' 11.46\" (1.815 m)   Wt 98.9 kg (218 lb 0.6 oz)   SpO2 93%   BMI 30.02 kg/m²     Current Shift:  No intake/output data recorded. Last three shifts:  08/25 0701 - 08/26 1900  In: 1003.8 [P.O.:620; I.V.:200]  Out: -             Labs: Results:       Chemistry Recent Labs     08/25/21  0240   *      K 3.5      CO2 29   BUN 7   CREA 0.57*   CA 7.9*   AGAP 6   BUCR 12   AP 87   TP 5.4*   ALB 2.1*   GLOB 3.3   AGRAT 0.6*      CBC w/Diff Recent Labs     08/26/21  1642 08/26/21  0255 08/25/21  0240 08/24/21  1050 08/24/21  1050   WBC  --  17.0* 19.1*  --  20.8*   RBC  --  2.31* 2.73*  --  2.73*   HGB 7.6* 6.4* 7.3*   < > 7.4*   HCT 23.4* 20.0* 22.7*   < > 22.5*   PLT  --  823* 680*  --  595*   GRANS  --  84* 77*  --  88*   LYMPH  --  8* 8*  --  0*   EOS  --  2 2  --  2    < > = values in this interval not displayed. Cardiac Enzymes No results for input(s): CPK, CKND1, JAMI in the last 72 hours. No lab exists for component: CKRMB, TROIP   Coagulation No results for input(s): PTP, INR, APTT, INREXT, INREXT in the last 72 hours. Lipid Panel No results found for: CHOL, CHOLPOCT, CHOLX, CHLST, CHOLV, 699076, HDL, HDLP, LDL, LDLC, DLDLP, 989520, VLDLC, VLDL, TGLX, TRIGL, TRIGP, TGLPOCT, CHHD, CHHDX   BNP No results for input(s): BNPP in the last 72 hours.    Liver Enzymes Recent Labs     08/25/21  0240   TP 5.4*   ALB 2.1*   AP 87      Thyroid Studies No results found for: T4, T3U, TSH, TSHEXT, TSHEXT     Procedures/imaging: see electronic medical records for all procedures/Xrays and details which were not copied into this note but were reviewed prior to creation of Plan

## 2021-08-26 NOTE — PROGRESS NOTES
1025- patient informed of hat placed in toilet for occult stool sample. Asked patient to notify staff after bowel movement to collect. 1200- specimen taken to lab    1230- blood transfusion began, vitals stable    1300-Remained with patient for 30 mins to help ease anxiety, pt was nervous about blood transfusion. Now calm and remains calm. Vitals remain stable. 1315- Infusion increased to 125ml/hr, pt tolerated well    1500- Blood transfusion of 1 unit PRBs completed. Pt tolerated transfusion well, vitals were stable, denied shortness of breath also.      1630- STAT hgb & hct order placed

## 2021-08-26 NOTE — PROGRESS NOTES
1330: Pt just started blood transfusion within the hour, Hbg 6.4. Spoke with spouse to have pt ambulate in [place] after blood transfusion is complete.

## 2021-08-27 VITALS
TEMPERATURE: 98.5 F | DIASTOLIC BLOOD PRESSURE: 77 MMHG | WEIGHT: 218.03 LBS | SYSTOLIC BLOOD PRESSURE: 154 MMHG | HEART RATE: 89 BPM | OXYGEN SATURATION: 98 % | BODY MASS INDEX: 30.52 KG/M2 | RESPIRATION RATE: 16 BRPM | HEIGHT: 71 IN

## 2021-08-27 LAB
ABO + RH BLD: NORMAL
ALBUMIN SERPL-MCNC: 1.9 G/DL (ref 3.4–5)
ALBUMIN/GLOB SERPL: 0.5 {RATIO} (ref 0.8–1.7)
ALP SERPL-CCNC: 118 U/L (ref 45–117)
ALT SERPL-CCNC: 32 U/L (ref 16–61)
ANION GAP SERPL CALC-SCNC: 5 MMOL/L (ref 3–18)
AST SERPL-CCNC: 27 U/L (ref 10–38)
BASOPHILS # BLD: 0 K/UL (ref 0–0.1)
BASOPHILS NFR BLD: 0 % (ref 0–2)
BILIRUB SERPL-MCNC: 0.3 MG/DL (ref 0.2–1)
BLD PROD TYP BPU: NORMAL
BLOOD GROUP ANTIBODIES SERPL: NORMAL
BPU ID: NORMAL
BUN SERPL-MCNC: 7 MG/DL (ref 7–18)
BUN/CREAT SERPL: 11 (ref 12–20)
CALCIUM SERPL-MCNC: 8 MG/DL (ref 8.5–10.1)
CALLED TO:,BCALL1: NORMAL
CHLORIDE SERPL-SCNC: 106 MMOL/L (ref 100–111)
CO2 SERPL-SCNC: 30 MMOL/L (ref 21–32)
CREAT SERPL-MCNC: 0.61 MG/DL (ref 0.6–1.3)
CROSSMATCH RESULT,%XM: NORMAL
DIFFERENTIAL METHOD BLD: ABNORMAL
EOSINOPHIL # BLD: 0.2 K/UL (ref 0–0.4)
EOSINOPHIL NFR BLD: 1 % (ref 0–5)
ERYTHROCYTE [DISTWIDTH] IN BLOOD BY AUTOMATED COUNT: 18.5 % (ref 11.6–14.5)
GLOBULIN SER CALC-MCNC: 4.1 G/DL (ref 2–4)
GLUCOSE SERPL-MCNC: 105 MG/DL (ref 74–99)
HCT VFR BLD AUTO: 26.2 % (ref 36–48)
HGB BLD-MCNC: 8.3 G/DL (ref 13–16)
LYMPHOCYTES # BLD: 1.2 K/UL (ref 0.9–3.6)
LYMPHOCYTES NFR BLD: 7 % (ref 21–52)
MCH RBC QN AUTO: 26.4 PG (ref 24–34)
MCHC RBC AUTO-ENTMCNC: 31.7 G/DL (ref 31–37)
MCV RBC AUTO: 83.4 FL (ref 78–100)
MONOCYTES # BLD: 1.3 K/UL (ref 0.05–1.2)
MONOCYTES NFR BLD: 8 % (ref 3–10)
NEUTS SEG # BLD: 14.1 K/UL (ref 1.8–8)
NEUTS SEG NFR BLD: 83 % (ref 40–73)
PERIPHERAL SMEAR,PSM: NORMAL
PLATELET # BLD AUTO: 1030 K/UL (ref 135–420)
PMV BLD AUTO: 8.7 FL (ref 9.2–11.8)
POTASSIUM SERPL-SCNC: 3.6 MMOL/L (ref 3.5–5.5)
PROT SERPL-MCNC: 6 G/DL (ref 6.4–8.2)
RBC # BLD AUTO: 3.14 M/UL (ref 4.35–5.65)
SODIUM SERPL-SCNC: 141 MMOL/L (ref 136–145)
SPECIMEN EXP DATE BLD: NORMAL
STATUS OF UNIT,%ST: NORMAL
UNIT DIVISION, %UDIV: 0
WBC # BLD AUTO: 17 K/UL (ref 4.6–13.2)

## 2021-08-27 PROCEDURE — 74011250637 HC RX REV CODE- 250/637: Performed by: HOSPITALIST

## 2021-08-27 PROCEDURE — 74011250636 HC RX REV CODE- 250/636: Performed by: SURGERY

## 2021-08-27 PROCEDURE — 74011250637 HC RX REV CODE- 250/637: Performed by: SURGERY

## 2021-08-27 PROCEDURE — C9113 INJ PANTOPRAZOLE SODIUM, VIA: HCPCS | Performed by: SURGERY

## 2021-08-27 PROCEDURE — 97116 GAIT TRAINING THERAPY: CPT

## 2021-08-27 PROCEDURE — 74011250636 HC RX REV CODE- 250/636: Performed by: INTERNAL MEDICINE

## 2021-08-27 PROCEDURE — 74011000258 HC RX REV CODE- 258: Performed by: HOSPITALIST

## 2021-08-27 PROCEDURE — 74011250636 HC RX REV CODE- 250/636: Performed by: HOSPITALIST

## 2021-08-27 PROCEDURE — 74011000250 HC RX REV CODE- 250: Performed by: SURGERY

## 2021-08-27 PROCEDURE — 85025 COMPLETE CBC W/AUTO DIFF WBC: CPT

## 2021-08-27 PROCEDURE — 74011000258 HC RX REV CODE- 258: Performed by: INTERNAL MEDICINE

## 2021-08-27 PROCEDURE — 80053 COMPREHEN METABOLIC PANEL: CPT

## 2021-08-27 PROCEDURE — 36415 COLL VENOUS BLD VENIPUNCTURE: CPT

## 2021-08-27 PROCEDURE — 74011250637 HC RX REV CODE- 250/637: Performed by: INTERNAL MEDICINE

## 2021-08-27 RX ORDER — AMOXICILLIN AND CLAVULANATE POTASSIUM 875; 125 MG/1; MG/1
1 TABLET, FILM COATED ORAL EVERY 12 HOURS
Qty: 28 TABLET | Refills: 0 | Status: SHIPPED | OUTPATIENT
Start: 2021-08-27 | End: 2021-09-10

## 2021-08-27 RX ORDER — FLUCONAZOLE 200 MG/1
400 TABLET ORAL DAILY
Qty: 28 TABLET | Refills: 0 | Status: SHIPPED | OUTPATIENT
Start: 2021-08-28 | End: 2021-09-11

## 2021-08-27 RX ORDER — PANTOPRAZOLE SODIUM 40 MG/1
40 TABLET, DELAYED RELEASE ORAL DAILY
Qty: 30 TABLET | Refills: 0 | Status: SHIPPED | OUTPATIENT
Start: 2021-08-27 | End: 2022-04-06 | Stop reason: CLARIF

## 2021-08-27 RX ORDER — ASPIRIN 81 MG/1
81 TABLET ORAL DAILY
Qty: 30 TABLET | Refills: 0 | Status: SHIPPED | OUTPATIENT
Start: 2021-08-27 | End: 2022-04-06 | Stop reason: CLARIF

## 2021-08-27 RX ORDER — SAME BUTANEDISULFONATE/BETAINE 400-600 MG
500 POWDER IN PACKET (EA) ORAL 2 TIMES DAILY
Qty: 28 CAPSULE | Refills: 0 | Status: SHIPPED | OUTPATIENT
Start: 2021-08-27 | End: 2021-09-03

## 2021-08-27 RX ADMIN — AMLODIPINE BESYLATE 5 MG: 5 TABLET ORAL at 11:36

## 2021-08-27 RX ADMIN — Medication 10 ML: at 06:00

## 2021-08-27 RX ADMIN — GABAPENTIN 100 MG: 100 CAPSULE ORAL at 11:36

## 2021-08-27 RX ADMIN — ATORVASTATIN CALCIUM 10 MG: 10 TABLET, FILM COATED ORAL at 11:36

## 2021-08-27 RX ADMIN — GABAPENTIN 100 MG: 100 CAPSULE ORAL at 15:40

## 2021-08-27 RX ADMIN — SODIUM CHLORIDE 40 MG: 9 INJECTION INTRAMUSCULAR; INTRAVENOUS; SUBCUTANEOUS at 11:36

## 2021-08-27 RX ADMIN — PIPERACILLIN AND TAZOBACTAM 3.38 G: 3; .375 INJECTION, POWDER, LYOPHILIZED, FOR SOLUTION INTRAVENOUS at 06:27

## 2021-08-27 RX ADMIN — ACETAMINOPHEN 1000 MG: 500 TABLET ORAL at 15:40

## 2021-08-27 RX ADMIN — IRON SUCROSE 200 MG: 20 INJECTION, SOLUTION INTRAVENOUS at 17:21

## 2021-08-27 RX ADMIN — Medication 10 ML: at 17:23

## 2021-08-27 RX ADMIN — PIPERACILLIN AND TAZOBACTAM 3.38 G: 3; .375 INJECTION, POWDER, LYOPHILIZED, FOR SOLUTION INTRAVENOUS at 00:16

## 2021-08-27 RX ADMIN — Medication 500 MG: at 11:38

## 2021-08-27 RX ADMIN — ACETAMINOPHEN 1000 MG: 500 TABLET ORAL at 11:36

## 2021-08-27 RX ADMIN — FLUCONAZOLE 400 MG: 100 TABLET ORAL at 11:36

## 2021-08-27 RX ADMIN — PIPERACILLIN AND TAZOBACTAM 3.38 G: 3; .375 INJECTION, POWDER, LYOPHILIZED, FOR SOLUTION INTRAVENOUS at 15:40

## 2021-08-27 NOTE — PROGRESS NOTES
Nutrition Assessment     Type and Reason for Visit: Reassess    Nutrition Recommendations/Plan: Continue w/ POC    Nutrition Assessment:  Patient admitted for Diverticulitis of large intestine with abscess, s/p low anterior resection, mobilization of splenic flexure, omental flap, abscess drainage, diarrhea- neg c.diff, anemia- blood transfusion, GI bleed. Diet advanced from full liquid to regular 8/24. Estimated Daily Nutrient Needs:  Energy (kcal):  2170  Protein (g):  99       Fluid (ml/day):  2170    Nutrition Related Findings:  Labs reviewed. Med: florastor, protonix, mag sulfate as needed, iron sucrose. Last BM noted 8/23-loose/watery. 51-75% PO 8/25. Patient stated appetite has been doing fine- had good breakfast this morning and will order lunch later. Patient is also doing fine with Ensure.       Current Nutrition Therapies:  ADULT ORAL NUTRITION SUPPLEMENT Breakfast, Lunch, Dinner; Standard High Calorie/High Protein  ADULT DIET Regular    Anthropometric Measures:  · Height:  5' 11.46\" (181.5 cm)  · Current Body Wt:  98.9 kg (218 lb 0.6 oz)  · BMI: 30    Nutrition Diagnosis:   · Predicted inadequate energy intake related to altered GI function as evidenced by intake 51-75%    Nutrition Intervention:  Food and/or Nutrient Delivery: Continue current diet, Continue oral nutrition supplement  Nutrition Education and Counseling: No recommendations at this time  Coordination of Nutrition Care: Continue to monitor while inpatient    Goals:  continue PO >75% of most meals and supplements throughout the next 3-6 days       Nutrition Monitoring and Evaluation:   Behavioral-Environmental Outcomes: None identified  Food/Nutrient Intake Outcomes: Food and nutrient intake, Supplement intake  Physical Signs/Symptoms Outcomes: Biochemical data, Meal time behavior, Skin, GI status, Weight, Nausea/vomiting    Discharge Planning:    Continue current diet, Continue oral nutrition supplement     Electronically signed by Fransisco Cantrell SONAL Falcon on 8/27/2021 at 9:15 AM

## 2021-08-27 NOTE — PROGRESS NOTES
Assumed care of pt shortly after 1930. Pt A&Ox4, VSS. Midline dressing dry and intact. IV iron sucrose given as ordered. Other night time meds given as well. Pt slept throughout the night. No issues at this time. Will continue to monitor.

## 2021-08-27 NOTE — PROGRESS NOTES
AFVSS  Feels OK  No bloody stool but stool pos for occult blood. Will continue to observe.     IV abx per ID

## 2021-08-27 NOTE — PROGRESS NOTES
1231 Received critical result of Platelets 9809. Informed Dr. Zelalem Patel. R Samuel Skelton 115 Per Dr. Zelalem Patel, give venofer before pt discharges. Reviewed that it is due at 2200 tonight, and he confirms to give it early. 5 Dr. Jo Ann Santos informs that pt will discharge. Also informed him that venofer will be given per Dr. Zelalem Patel. Noted that discharge order is now active. 1752 Changed abdominal dressing and educated wife and family at bedside. Informed Case mgt that pt is discharging.

## 2021-08-27 NOTE — PROGRESS NOTES
Pt refused dressing change this morning. Pt stated that he would like to wait until he takes a shower before changing his midline dressing. Surgical site dry and intact. Will pass this on to day RN.

## 2021-08-27 NOTE — DISCHARGE INSTRUCTIONS
Post-Operative Discharge Instructions  Roula Ovalle. Chanel Funes M.D.  [address] [place] [address] Kenton Ahumada  [phone]    Patient: Conrado Alicia MRN: [id number]  CSN: [id number]    YOB: 1955  Age: 61 y.o. Sex: male    DOA: 8/16/2021 LOS:  LOS: 11 days   Discharge Date:      Acute Diagnoses:  Diverticulitis of large intestine with abscess [K57.20]    Chronic Medical Diagnoses:  Problem List as of 8/27/2021 Date Reviewed: 8/16/2021        Codes Class Noted - Resolved    Anemia ICD-10-CM: D64.9  ICD-9-CM: 285.9  8/26/2021 - Present        GI bleed ICD-10-CM: K92.2  ICD-9-CM: 578.9  8/26/2021 - Present        Diarrhea ICD-10-CM: R19.7  ICD-9-CM: 787.91  8/21/2021 - Present        Hypokalemia ICD-10-CM: E87.6  ICD-9-CM: 276.8  8/21/2021 - Present        Hypomagnesemia ICD-10-CM: E83.42  ICD-9-CM: 275.2  8/21/2021 - Present        Hypophosphatemia ICD-10-CM: E83.39  ICD-9-CM: 275.3  8/21/2021 - Present        Diverticulitis of large intestine with abscess ICD-10-CM: K57.20  ICD-9-CM: 562.11, 569.5  8/16/2021 - Present        Abscess of sigmoid colon due to diverticulitis ICD-10-CM: K57.20  ICD-9-CM: 562.11, 569.5  7/15/2021 - Present              Diet  1. Resume prior to surgery diet as tolerated. Activity  1. Do not drive a car or operate any hazardous machinery the day of surgery. 2. Rest quietly today. 3. No bending or heavy lifting. 4. You may resume other prior to surgery activities as tolerated. 5. You may remove the bandage and shower in 1 day. Drain / Wound Care  1. Follow all drain / wound care instructions exactly as explained by the Nurse at time of discharge. 2. Apply an ice pack to the surgical site for 48 hours. 3. Do not put any salves or ointments on the wound. Allow it to form a dry scab. 4. Leave steri-strips / Dermabond alone. They should be allowed to fall off on their own in 7-14 days. Medications  1.  It is important to take your medications exactly as they are prescribed. 2. Keep your medication in the bottles provided by the pharmacist, and keep a list of the medication names, dosages, and times they should be taken in your wallet. Call 911 anytime you think you may need emergency care. For example, call if:  · You passed out (lost consciousness). · You have severe trouble breathing. · You have sudden chest pain and shortness of breath. Notify your Surgeon for any of the followin. Fever, chills, nausea, vomiting, severe abdominal pain or bleeding. 2. If you experience any redness or discharge or sign of infection. 3. Persistent nausea lasting more than 24 hours. If you are unable to reach your Surgeon for any of the symptoms above, you should proceed directly to the nearest Emergency Department. Post-Operative Appointment Information    Call Dr. Kulwinder Bolaños office tomorrow morning at [phone] to schedule a post-operative office visit in one (1) week. If any questions or concerns arise, call your Surgeon at [phone].     Patient Education

## 2021-08-27 NOTE — PROGRESS NOTES
D/C Plan: [address] and physician follow up      Pt has CT planned today. Noted ID has recommended oral ABX upon discharge. CM to continue to follow and assist.  Anticipate pt will transition home with [facility] and physician follow up when medically stable. Care Management Interventions  PCP Verified by CM: Yes  Palliative Care Criteria Met (RRAT>21 & CHF Dx)?: No  Mode of Transport at Discharge:  Other (see comment) (Family)  Transition of Care Consult (CM Consult): Discharge Planning, [address]: Yes  Health Maintenance Reviewed: Yes  Physical Therapy Consult: Yes  Occupational Therapy Consult: Yes  Current Support Network: Lives with Spouse  Confirm Follow Up Transport: Family  The Plan for Transition of Care is Related to the Following Treatment Goals : [facility] and physician follow up   The Patient and/or Patient Representative was Provided with a Choice of Provider and Agrees with the Discharge Plan?: Yes  Name of the Patient Representative Who was Provided with a Choice of Provider and Agrees with the Discharge Plan: pt  Freedom of Choice List was Provided with Basic Dialogue that Supports the Patient's Individualized Plan of Care/Goals, Treatment Preferences and Shares the Quality Data Associated with the Providers?: Yes  Discharge Location  Discharge Placement: Home with home health [facility])

## 2021-08-27 NOTE — PROGRESS NOTES
Problem: Mobility Impaired (Adult and Pediatric)  Goal: *Acute Goals and Plan of Care (Insert Text)  Description: Physical Therapy short term goals initiated 08/22/21, to be achieved in 3-7 days. Pt will:   1. Perform bed mobility with S in prep for OOB activity. 2. Perform sit <> stand transfers with LRAD and S in prep for functional mobility and ambulation. 3. Ambulate 100 ft with LRAD and S in prep for household and community mobility. 4. Ascend/descend >2 stairs with 1 HR and S for safe home entry. Outcome: Progressing Towards Goal   PHYSICAL THERAPY TREATMENT    Patient: Mary Scales (37 y.o. male)  Date: 8/27/2021  Diagnosis: Diverticulitis of large intestine with abscess [K57.20] <principal problem not specified>  Procedure(s) (LRB):  ROBOTIC ASSIST LOW ANTERIOR RESECTION, MOBILIZATION OF SPLENIC FLEXURE, OMENTAL FLAP, ABSCESS DRAINAGE, CYSTOSCOPY WITH URETERAL STENT PLACEMENT (ERAS PROTOCOL) (N/A) 11 Days Post-Op  Precautions:   PLOF: independent, ambulatory without AD    ASSESSMENT:  Pt is independent with bed mobility and sit to stand. Ambulated without AD, 420ft, steady reciprocal gt pattern, no LOB or path deviations. Negotiated 3 steps holding (B) hand rails. Returned to room and left sitting EOB. Progression toward goals:   [x]      Improving appropriately and progressing toward goals  []      Improving slowly and progressing toward goals  []      Not making progress toward goals and plan of care will be adjusted     PLAN:  Patient continues to benefit from skilled intervention to address the above impairments. Continue treatment per established plan of care. Discharge Recommendations:  Home Health vs None  Further Equipment Recommendations for Discharge:  N/A     SUBJECTIVE:   Patient stated I hope to leave today.     OBJECTIVE DATA SUMMARY:   Critical Behavior:  Neurologic State: Alert, Appropriate for age  Orientation Level: Oriented X4  Cognition: Appropriate decision making, Appropriate for age attention/concentration, Appropriate safety awareness, Follows commands  Safety/Judgement: Awareness of environment  Functional Mobility Training:  Bed Mobility:    Supine to Sit: Independent  Sit to Supine: Independent    Transfers:  Sit to Stand: Independent  Stand to Sit: Independent  Balance:  Sitting: Intact  Standing: Intact; Without support  Standing - Static: Good  Standing - Dynamic : Good   Ambulation/Gait Training:  Distance (ft): 420 Feet (ft)    Ambulation - Level of Assistance: Independent  Stairs:  Number of Stairs Trained: 3  Stairs - Level of Assistance: Supervision  Rail Use: Both        Pain:  Pain level pre-treatment: 0/10  Pain level post-treatment: 0/10   Pain Intervention(s): Medication (see MAR); Rest, Ice, Repositioning   Response to intervention: Nurse notified, See doc flow    Activity Tolerance:   Good  Please refer to the flowsheet for vital signs taken during this treatment. After treatment:   [] Patient left in no apparent distress sitting up in chair  [x] Patient left in no apparent distress in bed  [x] Call bell left within reach  [] Nursing notified  [] Caregiver present  [] Bed alarm activated  [] SCDs applied      COMMUNICATION/EDUCATION:   [x]         Role of Physical Therapy in the acute care setting. [x]         Fall prevention education was provided and the patient/caregiver indicated understanding. [x]         Patient/family have participated as able in working toward goals and plan of care. [x]         Patient/family agree to work toward stated goals and plan of care. []         Patient understands intent and goals of therapy, but is neutral about his/her participation.   []         Patient is unable to participate in stated goals/plan of care: ongoing with therapy staff.  []         Other:        Charli Oakley PTA   Time Calculation: 16 mins

## 2021-08-27 NOTE — PROGRESS NOTES
[facility]  (A Division of [address] [place] [address])    Follow-up Note  My partner Donna Gray MD will  ID service Monday morning. Date of Admission: 8/16/2021       Date of Note:  8/27/2021    Summary:    Mr. Helen Alvarado 60 YO WHITE/NON- male with history of complicated diverticulitis with perforation in July. Manged conservatively with drain and oral abx, didn't respond well and brought and underwent abd surgery on 8/16/21. Culture from abd drainage in July + Pseudomnas, E.coli and E.fecium. Initial abx of Cipro and flagyl PO as OP, and cipro changed to Bactrim due to side effect.      He had a drain placed after OR on 8/16 and was on Ceftriaxone and Flagyl when he started to have wbc go up to 19K. Tested for C.diff X2, ABX changed to zosyn over last weekend. But wbc still inclining up.     Sara Lees     CC:  \"I feel good; can I go home? \"    Interval History:  Overnight events reviewed. Bloody stool yesterday for which he got a pint of RBC. Resolved. Feels good. Still no pain. Eating all his plates  Passing gas       Current Antimicrobials:    Prior Antimicrobials:  1. Pip/tzb IV (8/21-) #6  2. Fluconazole PO (8/24-) #3 1. CAX IV (8/16-20)  2. Metronidazole IV (8/16-21)       Assessment: Rec / Plan:   Sigmoid Diverticulosis with perforation  No labs/CBC today, but he continues to look good such that I'm ok with him DC home today to complete another 2 wks amox-clav + fluconazole PO. Close f/u with surgeon next week. ->Pip/tzb #6 and Fluc #3    Dealer's choice. I'm good with him going home today to complete his 2 more weeks PO amox-clav + fluconazole.    Diverticulosis    HTN                Microbiology:                8/21 - CDT (-)                                         8/19 - CDT (-)                                            7/16 - ABD fluid (+) E coli (S all), E coli (R amp), P aeruginosa (S all), and Enterococcus faecium (S amp/vanco)        Lines / Catheters:         peripheral        Patient Active Problem List   Diagnosis Code    Abscess of sigmoid colon due to diverticulitis K57.20    Diverticulitis of large intestine with abscess K57.20    Diarrhea R19.7    Hypokalemia E87.6    Hypomagnesemia E83.42    Hypophosphatemia E83.39    Anemia D64.9    GI bleed K92.2       Current Facility-Administered Medications   Medication Dose Route Frequency    0.9% sodium chloride infusion 250 mL  250 mL IntraVENous PRN    iron sucrose (VENOFER) 200 mg in 0.9% sodium chloride 100 mL IVPB  200 mg IntraVENous Q24H    fluconazole (DIFLUCAN) tablet 400 mg  400 mg Oral DAILY    piperacillin-tazobactam (ZOSYN) 3.375 g in 0.9% sodium chloride (MBP/ADV) 100 mL MBP  3.375 g IntraVENous Q6H    pantoprazole (PROTONIX) 40 mg in 0.9% sodium chloride 10 mL injection  40 mg IntraVENous DAILY    Saccharomyces boulardii (FLORASTOR) capsule 500 mg  500 mg Oral BID    amLODIPine (NORVASC) tablet 5 mg  5 mg Oral DAILY    atorvastatin (LIPITOR) tablet 10 mg  10 mg Oral DAILY    sodium chloride (NS) flush 5-40 mL  5-40 mL IntraVENous Q8H    sodium chloride (NS) flush 5-40 mL  5-40 mL IntraVENous PRN    magnesium sulfate 2 g/50 ml IVPB (premix or compounded)  2 g IntraVENous PRN    acetaminophen (TYLENOL) tablet 1,000 mg  1,000 mg Oral TID    gabapentin (NEURONTIN) capsule 100 mg  100 mg Oral TID         Review of Systems - General ROS: negative for - chills, fever or night sweats  Respiratory ROS: no cough, shortness of breath, or wheezing  Cardiovascular ROS: no chest pain or dyspnea on exertion  Gastrointestinal ROS: no abdominal pain, change in bowel habits, or black or bloody stools       Objective:    Visit Vitals  BP (!) 145/60 (BP 1 Location: Left arm, BP Patient Position: Lying)   Pulse 85   Temp 99 °F (37.2 °C)   Resp 16   Ht 5' 11.46\" (1.815 m)   Wt 98.9 kg (218 lb 0.6 oz)   SpO2 93%   BMI 30.02 kg/m²       Temp (24hrs), Av.1 °F (37.3 °C), Min:98.8 °F (37.1 °C), Max:99.5 °F (37.5 °C)      GEN: WDWN WM in NAD  HEENT: anicteric  CHEST: CTA  CVS:RRR  ABD: NT NABS  EXT: no edema         Lab results:    Chemistry  Recent Labs     08/25/21  0240   *      K 3.5      CO2 29   BUN 7   CREA 0.57*   CA 7.9*   AGAP 6   BUCR 12   AP 87   TP 5.4*   ALB 2.1*   GLOB 3.3   AGRAT 0.6*       CBC w/ Diff  Recent Labs     08/26/21  1642 08/26/21  0255 08/25/21  0240 08/24/21  1050 08/24/21  1050   WBC  --  17.0* 19.1*  --  20.8*   RBC  --  2.31* 2.73*  --  2.73*   HGB 7.6* 6.4* 7.3*   < > 7.4*   HCT 23.4* 20.0* 22.7*   < > 22.5*   PLT  --  823* 680*  --  595*   GRANS  --  84* 77*  --  88*   LYMPH  --  8* 8*  --  0*   EOS  --  2 2  --  2    < > = values in this interval not displayed.        Microbiology  All Micro Results     Procedure Component Value Units Date/Time    C. DIFFICILE AG & TOXIN A/B [[id number]] Collected: 08/21/21 2200    Order Status: Completed Specimen: Stool Updated: 08/22/21 1537 [id number] ANTIGEN Negative        C. difficile toxin Negative        INTERPRETATION       NEGATIVE FOR TOXIGENIC C. DIFFICILE          C. DIFFICILE AG & TOXIN A/B [[id number]] Collected: 08/21/21 2200    Order Status: Canceled Specimen: Stool     C. DIFFICILE AG & TOXIN A/B [[id number]] Collected: 08/19/21 1830    Order Status: Completed Specimen: Stool Updated: 08/19/21 2222 [id number] ANTIGEN Negative        C. difficile toxin Negative        INTERPRETATION       NEGATIVE FOR TOXIGENIC C. DIFFICILE                 Kristen Doan MD  Cell [phone]  [facility]   8/27/2021   9:55 AM

## 2021-08-27 NOTE — CONSULTS
[id number] [facility]    Name:  Mendy Pabon  MR#:   [id number]  :  1955  ACCOUNT #:  [de-identified]  DATE OF SERVICE:  2021    HISTORY OF PRESENT ILLNESS:  This is a 24-year-old male who was admitted on  because of perforated diverticulitis, fistula formation, requiring sigmoid resection on  with primary anastomosis. I am being asked to see him today because of drop in his hemoglobin to 6.4. It was yesterday 7.3 and after one blood transfusion, it is 7.6. His hemoglobin before was on the low side. On , it was 11.4, on  was 10.4. His MCV was normal, but then it dropped and [facility] was normal.  The patient never noticed any blood, but he tested positive for occult blood in his stools. He has been back on a regular diet so far. He denies having any abdominal pain, dyspepsia, heartburns, nausea, vomiting, or dysphagia. He denies having any melena or rectal bleeding. He had no prior history of peptic ulcer disease. He denies taking NSAIDs, smoking, or abusing alcohol. PAST MEDICAL HISTORY:  Remarkable for high cholesterol, but no high blood pressure, diabetes, or coronary artery disease. He has no family history of cancer. He claims that he already had two negative colonoscopies. Last one was in 2018 by Dr. Reilly Jaramillo, and was told to repeat the colonoscopy in 10 more years. Appears to have regular bowel movements. He claims he never noticed any blood or rectal bleeding. He denies taking any NSAIDs. MEDICATIONS AT HOME:  He was taking only Lipitor 10 mg daily and Norvasc 5 mg. PHYSICAL EXAMINATION:  GENERAL:  We have a 24-year-old  male who appears to be in no distress. He is obese. VITAL SIGNS:  Weighs 218 pounds, temperature 99.1, pulse 96, blood pressure 154/56, breathing 18, saturation 93% to 100% on room air. SKIN:  Normal.  No evidence of any rash. HEENT:  The eyes are remarkable for pale conjunctivae. The sclerae are anicteric. The pupils are equal and reactive to light. Oropharyngeal cavity, he has moist and pink mucous membrane. Normal teeth. NECK:  Thick, but soft. Not tender. No palpable mass. LUNGS:  Clear to auscultation. HEART:  The cardiac rhythm is regular. S1 and S2 normal.  No murmur. ABDOMEN:  Obese, rounded. Difficult to palpate. He has some dressing on his abdomen. EXTREMITIES:  Unremarkable. LABORATORY DATA:  Blood tests:  Presently, his hemoglobin is 7.6 after one blood transfusion; platelets were 130; 83% neutrophils; WBC 17,000. Complete metabolic panel was normal with albumin of 2.1, potassium 3.5. Normal vitamin V45, folic acids, but iron saturation is 7 and ferritin is 335. CT scan of the abdomen and pelvis on 08/21/2021 spoke about normal liver and spleen. Evidence of prior low anterior resections, with contrast material present in the rectum. Several areas of ill-defined fluid noted immediately subjacent to the lower portion of the midline laparotomy, as well as along the paracolic gutter, without soft tissue gas formation. No significant bowel dilatation. ASSESSMENT:  In conclusion, this is a 81-year-old male who had a sigmoid resection on 08/16 for perforated diverticulitis. He seems to be doing well, but however his hemoglobin dropped acutely to 6.4. He was already anemic with chronic iron-deficiency anemia. The fact that he already has two negative colonoscopies makes the possibility of a bleeding colonic lesion unlikely. I would suggest that he may intraabdominal hematoma, although he does not complain of nausea, vomiting, or abdominal pain. The other possibility could be that he has a bleeding peptic ulcer,. We would like to check Helicobacter pylori, and I told him that he needs to check his stools on a regular basis. I warned him that he should avoid taking any aspirin or nonsteroidal antiinflammatory drugs. For now, he was started on Protonix 40 mg twice daily.   I think we should observe him for another day. There is no need at this time to do a colonoscopy as he already had a negative one just three years ago. I do not have access however to that report. This patient is obese; otherwise, he is in good health. For now, we would like to check his Helicobacter pylori serology and keep him on proton pump inhibitor, at least for two weeks and we may have to do an upper endoscopy if needed. Sincerely,      Roselia ROJAS MD      [email or web address]  D:  08/26/2021 21:30  T:  08/27/2021 0:30  JOB #:  [id number]  CC:   Kaila Esquivel MD

## 2021-08-27 NOTE — PROGRESS NOTES
Hospitalist Progress Note    Patient: Raul Argueta MRN: [id number]  CSN: [id number]    YOB: 1955  Age: 61 y.o. Sex: male    DOA: 8/16/2021 LOS:  LOS: 11 days            Chief complaint :  Perforated diverticulosis with abscess    Assessment/Plan     Patient Active Problem List   Diagnosis Code    Abscess of sigmoid colon due to diverticulitis K57.20    Diverticulitis of large intestine with abscess K57.20    Diarrhea R19.7    Hypokalemia E87.6    Hypomagnesemia E83.42    Hypophosphatemia E83.39    Anemia D64.9    GI bleed K92.2          61 y.o. male with past medical history significant for hypercholesterolemia is admitted by general surgery. He had perforated diverticulum with abscess. Perforated diverticulosis with abscess -   s/p low anterior resection, mobilization of splenic flexure, omental flap, abscess drainage. on ceftriaxone and Flagyl. Cultures showed growth of light E. coli, Enterococcus faecium, Pseudomonas aeruginosa. Patient is allergic to ciprofloxacin. Discharge on augmentin and fluconazole per ID    Hypokalemia, hypomagnesemia, hypophosphatemia: Check and replace electrolytes. Likely secondary to diarrhea. Diarrhea -   C. difficile negative,    Anemia -  Blood transfusion ordered  Iron panel with low iron, venofer ordered. GI bleed -  Stool for occult blood positive. Continue with protonix  GI consult appreciated. Thrombocytosis -  Reactive, started on aspirin. Discussed with patient and wife at bedside, discussed observing one more night due to thrombocytosis. Patient would like to go home. Will follow up with his PCP. Disposition : per primary    Review of systems  General: No fevers or chills. Cardiovascular: No chest pain or pressure. No palpitations. Pulmonary: No shortness of breath. Gastrointestinal: No nausea, vomiting. Physical Exam:  General: Awake, cooperative, no acute distress    HEENT: NC, Atraumatic.   [place]NN anicteric sclerae. Lungs: CTA Bilaterally. No Wheezing/Rhonchi/Rales. Heart:  S1 S2,  No murmur, No Rubs, No Gallops  Abdomen: Soft, Non distended, Non tender.  +Bowel sounds,   Extremities: No c/c/e  Psych:   Not anxious or agitated. Neurologic:  No acute neurological deficit. Vital signs/Intake and Output:  Visit Vitals  BP (!) 154/77   Pulse 89   Temp 98.5 °F (36.9 °C)   Resp 16   Ht 5' 11.46\" (1.815 m)   Wt 98.9 kg (218 lb 0.6 oz)   SpO2 98%   BMI 30.02 kg/m²     Current Shift:  No intake/output data recorded. Last three shifts:  08/26 0701 - 08/27 1900  In: 183.8   Out: -             Labs: Results:       Chemistry Recent Labs     08/27/21  1153 08/25/21  0240   * 104*    138   K 3.6 3.5    103   CO2 30 29   BUN 7 7   CREA 0.61 0.57*   CA 8.0* 7.9*   AGAP 5 6   BUCR 11* 12   * 87   TP 6.0* 5.4*   ALB 1.9* 2.1*   GLOB 4.1* 3.3   AGRAT 0.5* 0.6*      CBC w/Diff Recent Labs     08/27/21  1153 08/26/21  1642 08/26/21  0255 08/25/21  0240 08/25/21  0240   WBC 17.0*  --  17.0*  --  19.1*   RBC 3.14*  --  2.31*  --  2.73*   HGB 8.3* 7.6* 6.4*   < > 7.3*   HCT 26.2* 23.4* 20.0*   < > 22.7*   PLT 1,030*  --  823*  --  680*   GRANS 83*  --  84*  --  77*   LYMPH 7*  --  8*  --  8*   EOS 1  --  2  --  2    < > = values in this interval not displayed. Cardiac Enzymes No results for input(s): CPK, CKND1, JAMI in the last 72 hours. No lab exists for component: CKRMB, TROIP   Coagulation No results for input(s): PTP, INR, APTT, INREXT, INREXT in the last 72 hours. Lipid Panel No results found for: CHOL, CHOLPOCT, CHOLX, CHLST, CHOLV, [id number], HDL, HDLP, LDL, LDLC, DLDLP, 193397, VLDLC, VLDL, TGLX, TRIGL, TRIGP, TGLPOCT, CHHD, CHHDX   BNP No results for input(s): BNPP in the last 72 hours.    Liver Enzymes Recent Labs     08/27/21  1153   TP 6.0*   ALB 1.9*   *      Thyroid Studies No results found for: T4, T3U, TSH, TSHEXT, TSHEXT     Procedures/imaging: see electronic medical records for all procedures/Xrays and details which were not copied into this note but were reviewed prior to creation of Plan

## 2021-08-28 ENCOUNTER — HOME HEALTH ADMISSION (OUTPATIENT)
Dept: HOME HEALTH SERVICES | Facility: HOME HEALTH | Age: 66
End: 2021-08-28
Payer: COMMERCIAL

## 2021-08-29 ENCOUNTER — HOME CARE VISIT (OUTPATIENT)
Dept: SCHEDULING | Facility: HOME HEALTH | Age: 66
End: 2021-08-29
Payer: COMMERCIAL

## 2021-08-29 PROCEDURE — A6402 STERILE GAUZE <= 16 SQ IN: HCPCS

## 2021-08-29 PROCEDURE — A6216 NON-STERILE GAUZE<=16 SQ IN: HCPCS

## 2021-08-29 PROCEDURE — MED10158 APPLICATOR, COTTON-TIP, WOOD, 6, STRL

## 2021-08-29 PROCEDURE — 400013 HH SOC

## 2021-08-29 PROCEDURE — A6253 ABSORPT DRG > 48 SQ IN W/O B: HCPCS

## 2021-08-29 PROCEDURE — G0299 HHS/HOSPICE OF RN EA 15 MIN: HCPCS

## 2021-08-29 NOTE — Clinical Note
Patient was admitted to [facility] services on 8/29/21. Upon reviewing medications, the following moderate interactions were noted: diflucan and norvasc; diflucan and lipitor. Thank you!

## 2021-08-30 ENCOUNTER — HOME CARE VISIT (OUTPATIENT)
Dept: SCHEDULING | Facility: HOME HEALTH | Age: 66
End: 2021-08-30
Payer: COMMERCIAL

## 2021-08-30 VITALS — DIASTOLIC BLOOD PRESSURE: 80 MMHG | OXYGEN SATURATION: 98 % | HEART RATE: 69 BPM | SYSTOLIC BLOOD PRESSURE: 138 MMHG

## 2021-08-30 LAB
H PYLORI IGA SER-ACNC: <9 UNITS (ref 0–8.9)
H PYLORI IGG SER IA-ACNC: 0.3 INDEX VALUE (ref 0–0.79)

## 2021-08-30 PROCEDURE — G0299 HHS/HOSPICE OF RN EA 15 MIN: HCPCS

## 2021-08-30 NOTE — HOME HEALTH
Skilled reason for visit: wound care to abdomen     Caregiver involvement: wife will care for wound care . Medications reviewed and all medications are available in the home this visit. The following education was provided regarding medications, medication interactions, and look alike medications (specify): none. Medications  are effective at this time.       Home health supplies by type and quantity ordered/delivered this visit include: none    Patient education provided this visit: SN educated on wound care and gave instructions to wife who will preform when nurse is not coming , handwashing       Patient's Progress towards personal goals: patient stated that he is doing ok and feels better     Home exercise program: breathing techniques     Continued need for the following skills: Nursing    Plan for next visit: wound care     Patient and/or caregiver notified and agrees to changes in the Plan of Care N/A      The following discharge planning was discussed with the pt/caregiver: when goals met and education is complete

## 2021-08-30 NOTE — DISCHARGE SUMMARY
Discharge Summary    Patient: Shobha Spaulding MRN: [id number]  CSN: [id number]    YOB: 1955  Age: 61 y.o. Sex: male    DOA: 8/16/2021 LOS:  LOS: 11 days   Discharge Date: 8/27/2021     Admission Diagnoses: Diverticulitis of large intestine with abscess [K57.20]    Discharge Diagnoses:    Problem List as of 8/27/2021 Date Reviewed: 8/16/2021        Codes Class Noted - Resolved    Anemia ICD-10-CM: D64.9  ICD-9-CM: 285.9  8/26/2021 - Present        GI bleed ICD-10-CM: K92.2  ICD-9-CM: 578.9  8/26/2021 - Present        Diarrhea ICD-10-CM: R19.7  ICD-9-CM: 787.91  8/21/2021 - Present        Hypokalemia ICD-10-CM: E87.6  ICD-9-CM: 276.8  8/21/2021 - Present        Hypomagnesemia ICD-10-CM: E83.42  ICD-9-CM: 275.2  8/21/2021 - Present        Hypophosphatemia ICD-10-CM: E83.39  ICD-9-CM: 275.3  8/21/2021 - Present        Diverticulitis of large intestine with abscess ICD-10-CM: K57.20  ICD-9-CM: 562.11, 569.5  8/16/2021 - Present        Abscess of sigmoid colon due to diverticulitis ICD-10-CM: K57.20  ICD-9-CM: 562.11, 569.5  7/15/2021 - Present              Discharge Condition: Good    Hospital Course: Admitted for cecal vovulus, had R colectomy. Post op did well. Consults: None    Significant Diagnostic Studies: see chart    Discharge Medications:   Cannot display discharge medications since this patient is not currently admitted.       Activity: Activity as tolerated and no driving for today    Diet: Regular Diet    Wound Care: None needed    Follow-up: 2 weeks

## 2021-08-31 VITALS
OXYGEN SATURATION: 95 % | DIASTOLIC BLOOD PRESSURE: 82 MMHG | RESPIRATION RATE: 18 BRPM | TEMPERATURE: 99.5 F | SYSTOLIC BLOOD PRESSURE: 150 MMHG | HEART RATE: 83 BPM

## 2021-09-01 ENCOUNTER — HOME CARE VISIT (OUTPATIENT)
Dept: SCHEDULING | Facility: HOME HEALTH | Age: 66
End: 2021-09-01
Payer: COMMERCIAL

## 2021-09-01 VITALS — TEMPERATURE: 97.8 F | RESPIRATION RATE: 16 BRPM | HEART RATE: 88 BPM | OXYGEN SATURATION: 98 %

## 2021-09-01 PROCEDURE — A6216 NON-STERILE GAUZE<=16 SQ IN: HCPCS

## 2021-09-01 PROCEDURE — A4452 WATERPROOF TAPE: HCPCS

## 2021-09-01 PROCEDURE — A4217 STERILE WATER/SALINE, 500 ML: HCPCS

## 2021-09-01 PROCEDURE — G0299 HHS/HOSPICE OF RN EA 15 MIN: HCPCS

## 2021-09-01 PROCEDURE — A6252 ABSORPT DRG >16 <=48 W/O BDR: HCPCS

## 2021-09-01 NOTE — HOME HEALTH
Skilled services/Home bound verification:   Skilled Reason for admission/summary of clinical condition: s/p low anterior resection, mobilization of splenic flexure, omental flap, abscess drainage requiring disease process teaching and medication management, wound care. This patient is homebound for the following reasons Requires considerable and taxing effort to leave the home  and Requires the assistance of 1 or more persons to leave the home . Caregiver: spouse. Caregiver assists with iadls, adls, meal prep, med management, taking to md appointments. Medications reconciled and all medications are available in the home this visit. The following education was provided regarding medications, medication interactions, and look alike medications (specify): reviewed side effects, purposes, dosage, frequencies. Medications  are effective at this time. High risk medication teaching regarding anticoagulants, hyperglycemic agents or opiod narcotics performed (specify) NA    MD notified of any discrepancies/medication interactions-diflucan and norvasc; diflucan and lipitor. Home health supplies by type and quantity ordered/delivered this visit include: SN ordered 4x4s, abd pads, cotton tip applicators, NS. Patient education provided this visit to include: patient/cg instructed to monitor for edema/increase in edema, to elevate extremity when edema occurs and to notify md if edema exceeds normal limits for patient, edema noted- pt reporting elevating. patient made aware to monitor for s/s of infection [increased swelling, increased redness around site, increased pain, foul smelling drainage, fever] aware who to report to/when. no s/s of infection noted. pt aware to keep dressing clean, dry and intact as ordered. wound care performed per orders- old dressing completely removed, wound cleansed with ns and applied one piece of 4x4 packing, secured with abd pad and tape.  pt has laproscopic incisions open to air x 4, no wound care orders- will monitor for infection. encouraged patient to get three nutritional meals daily and to stay hydrated, drink plenty of fluids. pt instructed to follow a high protein diet for healing- to try to get 90g protein daily. reviewed low sodium diet- patient aware to limit sodium, no added sodium to diet. reviewed foods to avoid, how to order foods when eating out, how to read nutrition labels and measure sodium intake. discussed importance of monitoring blood pressure daily and recording for review, discussed hypertension, causes/long term effects of uncontrolled hypertension. discussed fall precautions in detail- having lighted hallways, removing throw rugs, monitoring medication that may alter mental status. patient made aware to turn every 2 hours and to keep pressure off of bony prominences, to monitor for any pressure ulcer development/worsening. pt denies any questions or concerns at this time. Patient/caregiver degree of understanding:good    Home exercise program/Homework provided: patient instructed to perform sob hep 4-5 x daily and prn for sob, to promote lung expansion. pt also encouraged to use ICS q 2 hours    Pt/Caregiver instructed on plan of care and are agreeable to plan of care at this time. Physician Dr. Heather Hoffman notified of patient admission to home health and plan of care including anticipated frequency of 3w1, 2w2, 1w6, 2 prn and treatments/interventions/modalities of wound care. Discharge planning discussed with patient and caregiver. Discharge planning as follows: Patient will be discharged once education has completed, patient is medically stable and pt/cg are able to independently manage wound care/ wound has healed or no longer requires skilled care. Emani Dodson Pt/Caregiver did verbalize understanding of discharge planning.      Next MD appointment TBD (date) with Radha Simons MD.  Patient/caregiver encouraged/instructed to keep appointment as lack of follow through with physician appointment could result in discontinuation of home care services for non-compliance.

## 2021-09-01 NOTE — HOME HEALTH
Skilled reason for visit: wound care to abdomen     Caregiver involvement: wife cares for all needs and is available 24/7. Medications reviewed and all medications are available in the home this visit. The following education was provided regarding medications, medication interactions, and look alike medications (specify): protonix and what it is for . Medications  are effective at this time.       Home health supplies by type and quantity ordered/delivered this visit include: none    Patient education provided this visit: SN eduacated on wound care and wound healing as well as handwashing and making sure that he is eating enough protien     Patient's Progress towards personal goals: patient stated that he is feeling better and getting around better     Home exercise program: breathing techniques     Continued need for the following skills: Nursing    Plan for next visit: wound care    Patient and/or caregiver notified and agrees to changes in the Plan of Care N/A      The following discharge planning was discussed with the pt/caregiver: when goals met and education is complete

## 2021-09-01 NOTE — PROGRESS NOTES
Physician Progress Note      PATIENTBobbette Hodgkin  [facility] #:                  [id number]  :                       1955  ADMIT DATE:       2021 5:06 AM  DISCH DATE:        2021 6:29 PM  RESPONDING  PROVIDER #:        Sean WOLFF MD          QUERY TEXT:    Pt admitted with Diverticulitis of large intestine with abscess. Pt noted to have Leukocytosis with elevated WBC. If possible, please document in the progress notes and discharge summary if you are evaluating and /or treating any of the following: The medical record reflects the following:    Risk Factors: Diverticulitis of large intestine with abscess    Clinical Indicators:  > WBC  2021 02:30 WBC: 9.6  2021 04:38 WBC: 15.1 (H)  2021 03:50 WBC: 18.8 (H)  2021 13:52 WBC: 19.4 (H)  2021 05:16 WBC: 14.8 (H)  2021 05:10 WBC: 15.6 (H)  2021 07:53 WBC: 18.7 (H)  2021 10:50 WBC: 20.8 (H)  2021 02:40 WBC: 19.1 (H)  > Procalcitonin pending  > HR 94, 95, 96,98, 100, 102  > /54, 109/50, 124/44  > Per ID - If wbc remains up-- consideration would be to re-scan for FU of abd loculation and aspiration for fluid gram stain and culture.     Treatment: receiving ID, CBC, procalcitonin-pending, zosyn    Thank you,  Brianna Jimenez, RN, BSN, [place], [address] [facility]  Options provided:  -- Sepsis, present on admission  -- Sepsis, not present on admission  -- Diverticulitis of large intestine with abscess without Sepsis  -- Other - I will add my own diagnosis  -- Disagree - Not applicable / Not valid  -- Disagree - Clinically unable to determine / Unknown  -- Refer to Clinical Documentation Reviewer    PROVIDER RESPONSE TEXT:    Not my patient    Query created by: Richard Lopez on 2021 8:19 AM      Electronically signed by:  Candy Paz MD 2021 8:50 AM

## 2021-09-02 ENCOUNTER — HOME CARE VISIT (OUTPATIENT)
Dept: HOME HEALTH SERVICES | Facility: HOME HEALTH | Age: 66
End: 2021-09-02
Payer: COMMERCIAL

## 2021-09-06 ENCOUNTER — HOME CARE VISIT (OUTPATIENT)
Dept: SCHEDULING | Facility: HOME HEALTH | Age: 66
End: 2021-09-06
Payer: COMMERCIAL

## 2021-09-06 VITALS
RESPIRATION RATE: 16 BRPM | DIASTOLIC BLOOD PRESSURE: 78 MMHG | HEART RATE: 68 BPM | OXYGEN SATURATION: 96 % | SYSTOLIC BLOOD PRESSURE: 122 MMHG | TEMPERATURE: 97.9 F

## 2021-09-06 PROCEDURE — G0299 HHS/HOSPICE OF RN EA 15 MIN: HCPCS

## 2021-09-06 NOTE — HOME HEALTH
Skilled reason for visit: wound care to abdomen     Caregiver involvement: wife cares for all needs . Medications reviewed and all medications are available in the home this visit. The following education was provided regarding medications, medication interactions, and look alike medications (specify): norvasc. Medications  are effective at this time.       Home health supplies by type and quantity ordered/delivered this visit include: none    Patient education provided this visit: SN educated on wound healing and wound care     Patient's Progress towards personal goals: patient stated that he is feeling better and getting around better     Home exercise program: breathing techqiues     Continued need for the following skills: Nursing    Plan for next visit: wound care     Patient and/or caregiver notified and agrees to changes in the Plan of Care N/A      The following discharge planning was discussed with the pt/caregiver: when goals met and education

## 2021-09-10 ENCOUNTER — HOME CARE VISIT (OUTPATIENT)
Dept: SCHEDULING | Facility: HOME HEALTH | Age: 66
End: 2021-09-10
Payer: COMMERCIAL

## 2021-09-10 VITALS
SYSTOLIC BLOOD PRESSURE: 120 MMHG | DIASTOLIC BLOOD PRESSURE: 78 MMHG | RESPIRATION RATE: 16 BRPM | HEART RATE: 76 BPM | OXYGEN SATURATION: 96 % | TEMPERATURE: 98.9 F

## 2021-09-10 PROCEDURE — G0299 HHS/HOSPICE OF RN EA 15 MIN: HCPCS

## 2021-09-10 NOTE — HOME HEALTH
Skilled reason for visit: wound care to abdomen vineet wound slightly red today he stated that it itches, banadge wet from shower SN reminded wife that he needed to keep that dry          Caregiver involvement: wife cares for all needs . Medications reviewed and all medications are available in the home this visit. The following education was provided regarding medications, medication interactions, and look alike medications (specify): none    Medications  are effective at this time.            Home health supplies by type and quantity ordered/delivered this visit include: none         Patient education provided this visit: SN educated on wound healing and wound care          Patient's Progress towards personal goals: patient stated that he is feeling better and that he is trying to walk more          Home exercise program: breathing techqiues          Continued need for the following skills: Nursing         Plan for next visit: wound care          Patient and/or caregiver notified and agrees to changes in the Plan of Care N/A           The following discharge planning was discussed with the pt/caregiver: when goals met and education

## 2021-09-11 NOTE — PROGRESS NOTES
Physician Progress Note      Brittany Mackey  [facility] #:                  [id number]  :                       1955  ADMIT DATE:       2021 5:06 AM  DISCH DATE:        2021 6:29 PM  RESPONDING  PROVIDER #:        SHAYAN MALAVE DO          QUERY TEXT:    Pt admitted with Diverticulitis of large intestine with abscess. Pt noted to have Leukocytosis with elevated WBC. If possible, please document in the progress notes and discharge summary if you are evaluating and /or treating any of the following: The medical record reflects the following:    Risk Factors: Diverticulitis of large intestine with abscess    Clinical Indicators:  > WBC  2021 02:30 WBC: 9.6  2021 04:38 WBC: 15.1 (H)  2021 03:50 WBC: 18.8 (H)  2021 13:52 WBC: 19.4 (H)  2021 05:16 WBC: 14.8 (H)  2021 05:10 WBC: 15.6 (H)  2021 07:53 WBC: 18.7 (H)  2021 10:50 WBC: 20.8 (H)  2021 02:40 WBC: 19.1 (H)  > Procalcitonin pending  > HR 94, 95, 96,98, 100, 102  > /54, 109/50, 124/44  > Per ID - If wbc remains up-- consideration would be to re-scan for FU of abd loculation and aspiration for fluid gram stain and culture. Treatment: receiving ID, CBC, procalcitonin-pending, zosyn    Thank you,  Cintia Zamorano, RN, BSN, [place], [address] [facility]  Options provided:  -- Sepsis, present on admission  -- Sepsis, not present on admission  -- Diverticulitis of large intestine with abscess without Sepsis  -- Other - I will add my own diagnosis  -- Disagree - Not applicable / Not valid  -- Disagree - Clinically unable to determine / Unknown  -- Refer to Clinical Documentation Reviewer    PROVIDER RESPONSE TEXT:    This patient has Diverticulitis of large intestine with abscess and perforation without Sepsis.     Query created by: Анна Cruz on 2021 1:27 PM      Electronically signed by:  Imani Motta DO 2021 9:10 AM

## 2021-09-13 ENCOUNTER — HOME CARE VISIT (OUTPATIENT)
Dept: SCHEDULING | Facility: HOME HEALTH | Age: 66
End: 2021-09-13
Payer: COMMERCIAL

## 2021-09-13 VITALS
DIASTOLIC BLOOD PRESSURE: 72 MMHG | TEMPERATURE: 97.8 F | HEART RATE: 80 BPM | SYSTOLIC BLOOD PRESSURE: 122 MMHG | OXYGEN SATURATION: 98 % | RESPIRATION RATE: 16 BRPM

## 2021-09-13 PROCEDURE — G0299 HHS/HOSPICE OF RN EA 15 MIN: HCPCS

## 2021-09-13 NOTE — HOME HEALTH
Skilled reason for visit: wound care to abdomen    Caregiver involvement: wife cares for all needs . Medications reviewed and all medications are available in the home this visit. The following education was provided regarding medications, medication interactions, and look alike medications (specify): none. Medications  are effective at this time.       Home health supplies by type and quantity ordered/delivered this visit include: none    Patient education provided this visit: patient educated on wound healing , eating enough protein     Patient's Progress towards personal goals: bacilio stated that he feel well and is walking around better     Home exercise program: breathing techqnieus     Continued need for the following skills: Nursing    Patient and/or caregiver notified and agrees to changes in the Plan of Care N/A      The following discharge planning was discussed with the pt/caregiver: when goals met and educaiton is complete

## 2021-09-17 ENCOUNTER — HOME CARE VISIT (OUTPATIENT)
Dept: SCHEDULING | Facility: HOME HEALTH | Age: 66
End: 2021-09-17
Payer: COMMERCIAL

## 2021-09-17 VITALS
OXYGEN SATURATION: 98 % | TEMPERATURE: 97.8 F | SYSTOLIC BLOOD PRESSURE: 132 MMHG | HEART RATE: 71 BPM | DIASTOLIC BLOOD PRESSURE: 88 MMHG | RESPIRATION RATE: 16 BRPM

## 2021-09-17 PROCEDURE — G0299 HHS/HOSPICE OF RN EA 15 MIN: HCPCS

## 2021-09-17 NOTE — HOME HEALTH
Skilled reason for visit: wound care to abdomen, patient saw MD on Tuesday and she removed staples          Caregiver involvement: wife cares for all needs . Medications reviewed and all medications are available in the home this visit. The following education was provided regarding medications, medication interactions, and look alike medications (specify): Norvasc and what it is and what it is used for     Medications  are effective at this time.            Home health supplies by type and quantity ordered/delivered this visit include: none         Patient education provided this visit: patient educated on wound healing , eating enough protein , making sure that he is propping his legs up, hand washing before wound care          Patient's Progress towards personal goals: patient stated that he feel well and is walking around better          Home exercise program: breathing techniques          Continued need for the following skills: Nursing         Patient and/or caregiver notified and agrees to changes in the Plan of Care N/A           The following discharge planning was discussed with the pt/caregiver: when goals met and education is complete

## 2021-09-22 ENCOUNTER — HOME CARE VISIT (OUTPATIENT)
Dept: SCHEDULING | Facility: HOME HEALTH | Age: 66
End: 2021-09-22
Payer: COMMERCIAL

## 2021-09-22 PROCEDURE — G0299 HHS/HOSPICE OF RN EA 15 MIN: HCPCS

## 2021-09-23 NOTE — HOME HEALTH
Skilled reason for visit: wound care to abdomen     Caregiver involvement: wife cares for all needs . Medications reconciled  and all medications are available in the home this visit. The following education was provided regarding medications, medication interactions, and look alike medications (specify): meds reconciled for discharge   Medications  are effective at this time.     Home health supplies by type and quantity ordered/delivered this visit include: none  Patient education provided this visit: patient educated on wound healing , eating enough protein , making sure that he is propping his legs up, hand washing before wound care constinue wound care   Patient's Progress towards personal goals: patient stated that he feel well and is walking around better   Home exercise program: breathing techniques   Continued need for the following skills: None    Patient and/or caregiver notified and agrees to changes in the Plan of Care N/A            the following discharge planning was discussed with the pt/caregiver: today [facility] agency

## 2021-09-27 VITALS
TEMPERATURE: 98.6 F | OXYGEN SATURATION: 98 % | HEART RATE: 78 BPM | DIASTOLIC BLOOD PRESSURE: 88 MMHG | SYSTOLIC BLOOD PRESSURE: 140 MMHG | RESPIRATION RATE: 16 BRPM

## 2021-10-05 ENCOUNTER — HOSPITAL ENCOUNTER (EMERGENCY)
Age: 66
Discharge: HOME | End: 2021-10-05
Attending: EMERGENCY MEDICINE
Payer: COMMERCIAL

## 2021-10-05 ENCOUNTER — APPOINTMENT (OUTPATIENT)
Dept: CT IMAGING | Age: 66
End: 2021-10-05
Attending: PHYSICIAN ASSISTANT
Payer: COMMERCIAL

## 2021-10-05 VITALS
HEIGHT: 71 IN | TEMPERATURE: 98.6 F | OXYGEN SATURATION: 99 % | SYSTOLIC BLOOD PRESSURE: 167 MMHG | BODY MASS INDEX: 29.4 KG/M2 | DIASTOLIC BLOOD PRESSURE: 89 MMHG | WEIGHT: 210 LBS | HEART RATE: 88 BPM | RESPIRATION RATE: 18 BRPM

## 2021-10-05 DIAGNOSIS — R11.10 NON-INTRACTABLE VOMITING, PRESENCE OF NAUSEA NOT SPECIFIED, UNSPECIFIED VOMITING TYPE: ICD-10-CM

## 2021-10-05 DIAGNOSIS — R94.31 ABNORMAL EKG: ICD-10-CM

## 2021-10-05 DIAGNOSIS — Z90.49 H/O HEMICOLECTOMY: ICD-10-CM

## 2021-10-05 DIAGNOSIS — K57.92 DIVERTICULITIS: Primary | ICD-10-CM

## 2021-10-05 LAB
ALBUMIN SERPL-MCNC: 3.2 G/DL (ref 3.4–5)
ALBUMIN/GLOB SERPL: 0.8 {RATIO} (ref 0.8–1.7)
ALP SERPL-CCNC: 73 U/L (ref 45–117)
ALT SERPL-CCNC: 35 U/L (ref 16–61)
ANION GAP SERPL CALC-SCNC: 2 MMOL/L (ref 3–18)
APPEARANCE UR: CLEAR
AST SERPL-CCNC: 22 U/L (ref 10–38)
ATRIAL RATE: 84 BPM
BACTERIA URNS QL MICRO: NORMAL /HPF
BASOPHILS # BLD: 0 K/UL (ref 0–0.1)
BASOPHILS NFR BLD: 0 % (ref 0–2)
BILIRUB SERPL-MCNC: 0.3 MG/DL (ref 0.2–1)
BILIRUB UR QL: NEGATIVE
BUN SERPL-MCNC: 11 MG/DL (ref 7–18)
BUN/CREAT SERPL: 14 (ref 12–20)
CALCIUM SERPL-MCNC: 9.6 MG/DL (ref 8.5–10.1)
CALCULATED P AXIS, ECG09: 37 DEGREES
CALCULATED R AXIS, ECG10: 18 DEGREES
CALCULATED T AXIS, ECG11: -5 DEGREES
CHLORIDE SERPL-SCNC: 100 MMOL/L (ref 100–111)
CK MB CFR SERPL CALC: ABNORMAL % (ref 0–4)
CK MB SERPL-MCNC: <1 NG/ML (ref 5–25)
CK SERPL-CCNC: 20 U/L (ref 39–308)
CO2 SERPL-SCNC: 34 MMOL/L (ref 21–32)
COLOR UR: YELLOW
CREAT SERPL-MCNC: 0.76 MG/DL (ref 0.6–1.3)
DIAGNOSIS, 93000: NORMAL
DIFFERENTIAL METHOD BLD: ABNORMAL
EOSINOPHIL # BLD: 0.2 K/UL (ref 0–0.4)
EOSINOPHIL NFR BLD: 2 % (ref 0–5)
EPITH CASTS URNS QL MICRO: NORMAL /LPF (ref 0–5)
ERYTHROCYTE [DISTWIDTH] IN BLOOD BY AUTOMATED COUNT: 15 % (ref 11.6–14.5)
GLOBULIN SER CALC-MCNC: 3.9 G/DL (ref 2–4)
GLUCOSE SERPL-MCNC: 115 MG/DL (ref 74–99)
GLUCOSE UR STRIP.AUTO-MCNC: NEGATIVE MG/DL
HCT VFR BLD AUTO: 37.5 % (ref 36–48)
HGB BLD-MCNC: 11.7 G/DL (ref 13–16)
HGB UR QL STRIP: NEGATIVE
KETONES UR QL STRIP.AUTO: NEGATIVE MG/DL
LACTATE BLD-SCNC: 1.02 MMOL/L (ref 0.4–2)
LEUKOCYTE ESTERASE UR QL STRIP.AUTO: ABNORMAL
LIPASE SERPL-CCNC: 62 U/L (ref 73–393)
LYMPHOCYTES # BLD: 1.2 K/UL (ref 0.9–3.6)
LYMPHOCYTES NFR BLD: 13 % (ref 21–52)
MAGNESIUM SERPL-MCNC: 2.2 MG/DL (ref 1.6–2.6)
MCH RBC QN AUTO: 25.2 PG (ref 24–34)
MCHC RBC AUTO-ENTMCNC: 31.2 G/DL (ref 31–37)
MCV RBC AUTO: 80.6 FL (ref 78–100)
MONOCYTES # BLD: 0.8 K/UL (ref 0.05–1.2)
MONOCYTES NFR BLD: 9 % (ref 3–10)
NEUTS SEG # BLD: 6.7 K/UL (ref 1.8–8)
NEUTS SEG NFR BLD: 75 % (ref 40–73)
NITRITE UR QL STRIP.AUTO: NEGATIVE
P-R INTERVAL, ECG05: 136 MS
PH UR STRIP: 7 [PH] (ref 5–8)
PLATELET # BLD AUTO: 502 K/UL (ref 135–420)
PMV BLD AUTO: 9.8 FL (ref 9.2–11.8)
POTASSIUM SERPL-SCNC: 3.9 MMOL/L (ref 3.5–5.5)
PROT SERPL-MCNC: 7.1 G/DL (ref 6.4–8.2)
PROT UR STRIP-MCNC: NEGATIVE MG/DL
Q-T INTERVAL, ECG07: 376 MS
QRS DURATION, ECG06: 82 MS
QTC CALCULATION (BEZET), ECG08: 444 MS
RBC # BLD AUTO: 4.65 M/UL (ref 4.35–5.65)
RBC #/AREA URNS HPF: NORMAL /HPF (ref 0–5)
SODIUM SERPL-SCNC: 136 MMOL/L (ref 136–145)
SP GR UR REFRACTOMETRY: 1.02 (ref 1–1.03)
TROPONIN I SERPL-MCNC: <0.02 NG/ML (ref 0–0.04)
UROBILINOGEN UR QL STRIP.AUTO: 0.2 EU/DL (ref 0.2–1)
VENTRICULAR RATE, ECG03: 84 BPM
WBC # BLD AUTO: 9 K/UL (ref 4.6–13.2)
WBC URNS QL MICRO: NORMAL /HPF (ref 0–5)

## 2021-10-05 PROCEDURE — 81001 URINALYSIS AUTO W/SCOPE: CPT

## 2021-10-05 PROCEDURE — 82553 CREATINE MB FRACTION: CPT

## 2021-10-05 PROCEDURE — 83690 ASSAY OF LIPASE: CPT

## 2021-10-05 PROCEDURE — 74177 CT ABD & PELVIS W/CONTRAST: CPT

## 2021-10-05 PROCEDURE — 85025 COMPLETE CBC W/AUTO DIFF WBC: CPT

## 2021-10-05 PROCEDURE — 80053 COMPREHEN METABOLIC PANEL: CPT

## 2021-10-05 PROCEDURE — 96375 TX/PRO/DX INJ NEW DRUG ADDON: CPT

## 2021-10-05 PROCEDURE — 74011000636 HC RX REV CODE- 636: Performed by: EMERGENCY MEDICINE

## 2021-10-05 PROCEDURE — 74011000636 HC RX REV CODE- 636: Performed by: PHYSICIAN ASSISTANT

## 2021-10-05 PROCEDURE — 99285 EMERGENCY DEPT VISIT HI MDM: CPT

## 2021-10-05 PROCEDURE — 83605 ASSAY OF LACTIC ACID: CPT

## 2021-10-05 PROCEDURE — 93005 ELECTROCARDIOGRAM TRACING: CPT

## 2021-10-05 PROCEDURE — 96374 THER/PROPH/DIAG INJ IV PUSH: CPT

## 2021-10-05 PROCEDURE — 74011250636 HC RX REV CODE- 250/636: Performed by: PHYSICIAN ASSISTANT

## 2021-10-05 PROCEDURE — 83735 ASSAY OF MAGNESIUM: CPT

## 2021-10-05 RX ORDER — FAMOTIDINE 10 MG/ML
20 INJECTION INTRAVENOUS
Status: COMPLETED | OUTPATIENT
Start: 2021-10-05 | End: 2021-10-05

## 2021-10-05 RX ORDER — SODIUM CHLORIDE 9 MG/ML
125 INJECTION, SOLUTION INTRAVENOUS ONCE
Status: COMPLETED | OUTPATIENT
Start: 2021-10-05 | End: 2021-10-05

## 2021-10-05 RX ORDER — ONDANSETRON 4 MG/1
4 TABLET, ORALLY DISINTEGRATING ORAL
Qty: 12 TABLET | Refills: 0 | Status: SHIPPED | OUTPATIENT
Start: 2021-10-05 | End: 2022-04-06 | Stop reason: CLARIF

## 2021-10-05 RX ORDER — AMOXICILLIN AND CLAVULANATE POTASSIUM 875; 125 MG/1; MG/1
1 TABLET, FILM COATED ORAL 2 TIMES DAILY
Qty: 20 TABLET | Refills: 0 | Status: SHIPPED | OUTPATIENT
Start: 2021-10-05 | End: 2021-10-15

## 2021-10-05 RX ADMIN — IOHEXOL: 240 INJECTION, SOLUTION INTRATHECAL; INTRAVASCULAR; INTRAVENOUS; ORAL at 15:53

## 2021-10-05 RX ADMIN — IOPAMIDOL 100 ML: 612 INJECTION, SOLUTION INTRAVENOUS at 17:17

## 2021-10-05 RX ADMIN — SODIUM CHLORIDE 125 ML/HR: 900 INJECTION, SOLUTION INTRAVENOUS at 15:53

## 2021-10-05 RX ADMIN — FAMOTIDINE 20 MG: 10 INJECTION, SOLUTION INTRAVENOUS at 16:07

## 2021-10-05 NOTE — ED PROVIDER NOTES
EMERGENCY DEPARTMENT HISTORY AND PHYSICAL EXAM    Date: 10/5/2021  Patient Name: Emily Singh    History of Presenting Illness     Time Seen: 3:06 PM    Chief Complaint   Patient presents with    Epigastric Pain    Constipation    Vomiting       History Provided By: Patient    Additional History (Context): Emily Singh is a 61 y.o. male presents emergency room with complaints of intermittent vomiting that started last Wednesday (6 days ago). Patient vomited several times that day. Was not really experiencing any pain. No diarrhea. Was afebrile. Patient recently had surgery by Dr. Lisseth Erwin for ruptured diverticuli with abscess formation. Admission was August 16, 2021. Patient was hospitalized for 11 days for that illness. Underwent right colectomy. Since home, had been doing well. Patient states that his bowels are normal.  No blood. Still able to pass gas without any issues. Again today not having any pain. No documented fever or chills. No difficulty urinating. Denies any indigestion, heartburn. Status post appendectomy years ago. Patient had been in contact with his surgeon who recommended he come to the ER for further evaluation. PCP: Mitchell Carbajal MD    Current Outpatient Medications   Medication Sig Dispense Refill    amoxicillin-clavulanate (Augmentin) 875-125 mg per tablet Take 1 Tablet by mouth two (2) times a day for 10 days. 20 Tablet 0    ondansetron (Zofran ODT) 4 mg disintegrating tablet Take 1 Tablet by mouth every eight (8) hours as needed for Nausea or Vomiting. 12 Tablet 0    acetaminophen (TYLENOL) 500 mg tablet Take 500 mg by mouth every six (6) hours as needed for Pain.  pantoprazole (Protonix) 40 mg tablet Take 1 Tablet by mouth daily. 30 Tablet 0    aspirin delayed-release 81 mg tablet Take 1 Tablet by mouth daily. 30 Tablet 0    amLODIPine (NORVASC) 5 mg tablet Take 1 Tablet by mouth daily.  30 Tablet 0    atorvastatin (LIPITOR) 10 mg tablet Take 10 mg by mouth daily. Indications: excessive fat in the blood         Past History     Past Medical History:  Past Medical History:   Diagnosis Date    High cholesterol        Past Surgical History:  Past Surgical History:   Procedure Laterality Date    HX APPENDECTOMY  1968    IR INJ ABS CYST VIA CATHETER / TUBE  7/26/2021       Family History:  No family history on file. Social History:  Social History     Tobacco Use    Smoking status: Never Smoker    Smokeless tobacco: Never Used   Vaping Use    Vaping Use: Never used   Substance Use Topics    Alcohol use: Yes     Alcohol/week: 5.0 standard drinks     Types: 2 Glasses of wine, 3 Shots of liquor per week    Drug use: Never       Allergies: Allergies   Allergen Reactions    Ciprofloxacin Hives         Review of Systems   Review of Systems   Constitutional: Negative for chills, fatigue and fever. Respiratory: Negative for chest tightness and shortness of breath. Cardiovascular: Negative for chest pain. Gastrointestinal: Positive for nausea and vomiting. Negative for abdominal pain, blood in stool, constipation and diarrhea. Genitourinary: Negative for decreased urine volume, dysuria, flank pain, frequency, hematuria and urgency. Musculoskeletal: Negative for back pain. Neurological: Negative for dizziness, light-headedness and headaches. Physical Exam     Vitals:    10/05/21 1444 10/05/21 1909 10/05/21 1930   BP: (!) 157/74 (!) 154/89 (!) 167/89   Pulse: (!) 109 88    Resp: 16 18    Temp: 98.6 °F (37 °C)     SpO2: 97% 100% 99%   Weight: 95.3 kg (210 lb)     Height: 5' 11\" (1.803 m)       Physical Exam  Vitals and nursing note reviewed. Constitutional:       General: He is not in acute distress. Appearance: Normal appearance. He is well-developed, well-groomed and normal weight. He is not ill-appearing. Comments: 30-year-old male no apparent distress. Vital signs show him to be hypertensive 167/89 otherwise stable. Cooperative. In the room with his wife. HENT:      Mouth/Throat:      Mouth: Mucous membranes are moist.      Pharynx: Oropharynx is clear. Eyes:      General: No scleral icterus. Extraocular Movements: Extraocular movements intact. Conjunctiva/sclera: Conjunctivae normal.      Pupils: Pupils are equal, round, and reactive to light. Cardiovascular:      Rate and Rhythm: Normal rate and regular rhythm. Pulses: Normal pulses. Heart sounds: Normal heart sounds. Pulmonary:      Effort: Pulmonary effort is normal.      Breath sounds: Normal breath sounds and air entry. Abdominal:      General: Abdomen is flat. A surgical scar is present. Bowel sounds are normal.      Palpations: Abdomen is soft. There is no hepatomegaly, splenomegaly or mass. Tenderness: There is no abdominal tenderness. There is no right CVA tenderness, left CVA tenderness or rebound. Comments: There is evidence of a healing incisional scar below the level of the umbilicus. This is the larger incisional wound during the patient's hemicolectomy. There is a small area of drainage coming from the top of the wound. There are also several laparoscopic surgical wounds that are well-healed. The abdomen is nontender to palpation. Musculoskeletal:      Cervical back: Neck supple. Lymphadenopathy:      Cervical: No cervical adenopathy. Skin:     General: Skin is warm and dry. Neurological:      Mental Status: He is alert and oriented to person, place, and time. Psychiatric:         Mood and Affect: Mood normal.         Behavior: Behavior normal. Behavior is cooperative.          Nursing note and vitals reviewed         Diagnostic Study Results     Labs -     Recent Results (from the past 12 hour(s))   EKG, 12 LEAD, INITIAL    Collection Time: 10/05/21  3:02 PM   Result Value Ref Range    Ventricular Rate 84 BPM    Atrial Rate 84 BPM    P-R Interval 136 ms    QRS Duration 82 ms    Q-T Interval 376 ms    QTC Calculation (Bezet) 444 ms    Calculated P Axis 37 degrees    Calculated R Axis 18 degrees    Calculated T Axis -5 degrees    Diagnosis       Normal sinus rhythm  Minimal voltage criteria for LVH, may be normal variant  Inferior infarct , age undetermined  Abnormal ECG  When compared with ECG of 29-JUN-2021 09:03,  Inferior infarct is now present  Inverted T waves have replaced nonspecific T wave abnormality in Inferior   leads  Nonspecific T wave abnormality now evident in Lateral leads     CBC WITH AUTOMATED DIFF    Collection Time: 10/05/21  3:11 PM   Result Value Ref Range    WBC 9.0 4.6 - 13.2 K/uL    RBC 4.65 4.35 - 5.65 M/uL    HGB 11.7 (L) 13.0 - 16.0 g/dL    HCT 37.5 36.0 - 48.0 %    MCV 80.6 78.0 - 100.0 FL    MCH 25.2 24.0 - 34.0 PG    MCHC 31.2 31.0 - 37.0 g/dL    RDW 15.0 (H) 11.6 - 14.5 %    PLATELET 195 (H) 816 - 420 K/uL    MPV 9.8 9.2 - 11.8 FL    NEUTROPHILS 75 (H) 40 - 73 %    LYMPHOCYTES 13 (L) 21 - 52 %    MONOCYTES 9 3 - 10 %    EOSINOPHILS 2 0 - 5 %    BASOPHILS 0 0 - 2 %    ABS. NEUTROPHILS 6.7 1.8 - 8.0 K/UL    ABS. LYMPHOCYTES 1.2 0.9 - 3.6 K/UL    ABS. MONOCYTES 0.8 0.05 - 1.2 K/UL    ABS. EOSINOPHILS 0.2 0.0 - 0.4 K/UL    ABS. BASOPHILS 0.0 0.0 - 0.1 K/UL    DF AUTOMATED     METABOLIC PANEL, COMPREHENSIVE    Collection Time: 10/05/21  3:11 PM   Result Value Ref Range    Sodium 136 136 - 145 mmol/L    Potassium 3.9 3.5 - 5.5 mmol/L    Chloride 100 100 - 111 mmol/L    CO2 34 (H) 21 - 32 mmol/L    Anion gap 2 (L) 3.0 - 18 mmol/L    Glucose 115 (H) 74 - 99 mg/dL    BUN 11 7.0 - 18 MG/DL    Creatinine 0.76 0.6 - 1.3 MG/DL    BUN/Creatinine ratio 14 12 - 20      GFR est AA >60 >60 ml/min/1.73m2    GFR est non-AA >60 >60 ml/min/1.73m2    Calcium 9.6 8.5 - 10.1 MG/DL    Bilirubin, total 0.3 0.2 - 1.0 MG/DL    ALT (SGPT) 35 16 - 61 U/L    AST (SGOT) 22 10 - 38 U/L    Alk.  phosphatase 73 45 - 117 U/L    Protein, total 7.1 6.4 - 8.2 g/dL    Albumin 3.2 (L) 3.4 - 5.0 g/dL    Globulin 3.9 2.0 - 4.0 g/dL    A-G Ratio 0.8 0.8 - 1.7     LIPASE    Collection Time: 10/05/21  3:11 PM   Result Value Ref Range    Lipase 62 (L) 73 - 393 U/L   MAGNESIUM    Collection Time: 10/05/21  3:11 PM   Result Value Ref Range    Magnesium 2.2 1.6 - 2.6 mg/dL   CARDIAC PANEL,(CK, CKMB & TROPONIN)    Collection Time: 10/05/21  3:15 PM   Result Value Ref Range    CK - MB <1.0 <3.6 ng/ml    CK-MB Index  0.0 - 4.0 %     CALCULATION NOT PERFORMED WHEN RESULT IS BELOW LINEAR LIMIT    CK 20 (L) 39 - 308 U/L    Troponin-I, QT <0.02 0.0 - 0.045 NG/ML   POC LACTIC ACID    Collection Time: 10/05/21  4:01 PM   Result Value Ref Range    Lactic Acid (POC) 1.02 0.40 - 2.00 mmol/L   URINALYSIS W/ RFLX MICROSCOPIC    Collection Time: 10/05/21  5:41 PM   Result Value Ref Range    Color YELLOW      Appearance CLEAR      Specific gravity 1.020 1.005 - 1.030      pH (UA) 7.0 5.0 - 8.0      Protein Negative NEG mg/dL    Glucose Negative NEG mg/dL    Ketone Negative NEG mg/dL    Bilirubin Negative NEG      Blood Negative NEG      Urobilinogen 0.2 0.2 - 1.0 EU/dL    Nitrites Negative NEG      Leukocyte Esterase TRACE (A) NEG     URINE MICROSCOPIC ONLY    Collection Time: 10/05/21  5:41 PM   Result Value Ref Range    WBC 0 to 3 0 - 5 /hpf    RBC 0 to 3 0 - 5 /hpf    Epithelial cells FEW 0 - 5 /lpf    Bacteria NONE SEEN NEG /hpf       Radiologic Studies   CT ABD PELV W CONT   Final Result      1. Acute diverticulitis involving proximal/mid portions of the descending colon. No definite free air or loculated fluid collection to indicate perforation. Line   2. Small left pleural effusion with adjacent left lower lobe basilar   atelectasis. CT Results  (Last 48 hours)               10/05/21 1725  CT ABD PELV W CONT Final result    Impression:      1. Acute diverticulitis involving proximal/mid portions of the descending colon. No definite free air or loculated fluid collection to indicate perforation. Line   2.  Small left pleural effusion with adjacent left lower lobe basilar   atelectasis. Narrative:  EXAM: CT ABD PELV W CONT       CLINICAL INDICATION/HISTORY: Recent surgery - diverticulitis. Has had intermit   tant vomiting since last Wednesday. Consider obstruction       COMPARISON: 8/21/2021       TECHNIQUE:   CT of the abdomen and pelvis following intravenous contrast   administration. Coronal and sagittal reformats were generated and reviewed. One or more dose reduction techniques were used on this CT: automated exposure   control, adjustment of the mAs and/or kVp according to patient size, and   iterative reconstruction techniques. The specific techniques used on this CT   exam have been documented in the patient's electronic medical record. Digital   Imaging and Communications in Medicine (DICOM) format image data are available   to nonaffiliated external healthcare facilities or entities on a secure, media   free, reciprocally searchable basis with patient authorization for at least a   12-month period after this study. _______________       FINDINGS:       LOWER THORAX:  Small left pleural effusion with atelectasis throughout basilar   segments of the left lower lobe. No right pleural effusion. Visualized right   lung base remains adequately aerated. No global cardiomegaly or pericardial   effusion. LIVER AND BILIARY:  Subcentimeter superior left hepatic lobe the medial right   hepatic lobe cysts. Hepatic steatosis. No suspicious liver lesions. No biliary   dilation. Gallbladder unremarkable. SPLEEN:  Normal.       PANCREAS:  Normal.       ADRENALS:  Normal.       KIDNEYS:  Simple appearing posterior interpolar left renal cyst. No follow-up   imaging is recommended as incidental lesions are likely benign. LYMPH NODES:  No mesenteric or retroperitoneal lymphadenopathy.        GI TRACT:  Moderate mesenteric inflammatory stranding is seen adjacent to the   proximal and mid portions of the descending colon, beginning just beyond the   splenic flexure. There is heterogeneous mural thickening in several adjacent   colon diverticula in this vicinity as well. There are several sigmoid colon   diverticula, with no findings of diverticulitis more distally in the region of   the sigmoid colon. Anastomosis staple line is seen in the region of the mid   sigmoid colon. Normal caliber small and large bowel loops. No morphology of   bowel obstruction. PELVIC ORGANS:  Bladder is normal in appearance. No acute abnormality. VASCULATURE: Normal caliber lower thoracic and abdominal aorta with mild   atherosclerotic vascular calcification. OTHER:   Minimal inflammatory fluid is seen in the left paracolic gutter. No   loculated fluid collection or intraperitoneal air to indicate perforation of the   descending colon diverticulitis. OSSEOUS STRUCTURES:  No acute osseous abnormality. Mild multilevel degenerative   disk disease and facet arthropathy. _______________               CXR Results  (Last 48 hours)    None            Medical Decision Making   I am the first provider for this patient. I reviewed the vital signs, available nursing notes, past medical history, past surgical history, family history and social history. Vital Signs-Reviewed the patient's vital signs. Pulse Oximetry Analysis 99% on room air    Records Reviewed: Nursing Notes, Old Medical Records, Previous Radiology Studies and Previous Laboratory Studies    DDX: Recent hemicolectomy for diverticulitis/abscess  ? Recurrence  Consideration for early small bowel obstruction  Gastritis, peptic ulcer disease, duodenitis, pancreatitis, hepatitis    Provider Notes:   61 y.o. male     Procedures:  Procedures    ED Course:   Initial assessment performed.  The patients presenting problems have been discussed, and they are in agreement with the care plan formulated and outlined with them. I have encouraged them to ask questions as they arise throughout their visit. ED Physician Discussion Note:   Patient's labs look good. No significant abnormality. Urinalysis negative  CAT scan shows evidence of diverticulitis without perforation or abscess formation per radiology    Patient was informed of his results. We will attempt to contact his surgeon    Spoke to covering general surgeon Dr. Ana Middleton. Advised of the patient's results regarding his CAT scan. Believes that the diverticulitis might be more reactive to his recent surgery but does recommend initiating antibiotics as well as medication for nausea. Will admit patient if he feels as though patient wants to be admitted. Patient declined admission. General surgeon recommended close follow-up with office tomorrow to either be seen by nurse practitioner or by MD.    Patient did have what appeared to be an abnormal EKG. However was not having any complaints of any chest pain. Negative cardiac evaluation here in the emergency room. Patient was advised to return to the ER for any worsening symptoms. Diagnosis and Disposition       DISCHARGE NOTE:  Irma Downey's  results have been reviewed with him. He has been counseled regarding his diagnosis, treatment, and plan. He verbally conveys understanding and agreement of the signs, symptoms, diagnosis, treatment and prognosis and additionally agrees to follow up as discussed. He also agrees with the care-plan and conveys that all of his questions have been answered. I have also provided discharge instructions for him that include: educational information regarding their diagnosis and treatment, and list of reasons why they would want to return to the ED prior to their follow-up appointment, should his condition change. He has been provided with education for proper emergency department utilization. CLINICAL IMPRESSION:    1. Diverticulitis    2.  H/O hemicolectomy 3. Non-intractable vomiting, presence of nausea not specified, unspecified vomiting type    4. Abnormal EKG        PLAN:  1. D/C Home  2. Discharge Medication List as of 10/5/2021  7:41 PM      START taking these medications    Details   amoxicillin-clavulanate (Augmentin) 875-125 mg per tablet Take 1 Tablet by mouth two (2) times a day for 10 days. , Normal, Disp-20 Tablet, R-0      ondansetron (Zofran ODT) 4 mg disintegrating tablet Take 1 Tablet by mouth every eight (8) hours as needed for Nausea or Vomiting., Normal, Disp-12 Tablet, R-0         CONTINUE these medications which have NOT CHANGED    Details   acetaminophen (TYLENOL) 500 mg tablet Take 500 mg by mouth every six (6) hours as needed for Pain., Historical Med      pantoprazole (Protonix) 40 mg tablet Take 1 Tablet by mouth daily. , Normal, Disp-30 Tablet, R-0      aspirin delayed-release 81 mg tablet Take 1 Tablet by mouth daily. , Normal, Disp-30 Tablet, R-0      amLODIPine (NORVASC) 5 mg tablet Take 1 Tablet by mouth daily. , Print, Disp-30 Tablet, R-0      atorvastatin (LIPITOR) 10 mg tablet Take 10 mg by mouth daily. Indications: excessive fat in the blood, Historical Med           3. Follow-up Information     Follow up With Specialties Details Why Contact Jasmin Carranza,  Colon and Rectal Surgery Call  Call tomorrow to set up an appointment to be seen this week [address] EMERGENCY DEPT Emergency Medicine  If symptoms worsen, As needed [address] [place] [id number] [phone]        ____________________________________     Please note that this dictation was completed with [employer], the computer voice recognition software. Quite often unanticipated grammatical, syntax, homophones, and other interpretive errors are inadvertently transcribed by the computer software. Please disregard these errors.   Please excuse any errors that have escaped final proofreading.

## 2021-10-05 NOTE — ED TRIAGE NOTES
Patient reports he had a colectomy 08/16/2021. He said that Dr. Chucho Bethea told him to come to  Ed for a work up. States he is has been vomiting but that resolved and reports that he has epigastric pain. No bm since last Saturday.  Last night was the first food he has had

## 2021-10-05 NOTE — DISCHARGE INSTRUCTIONS
Rest  Clear liquids  [place] diet. Advance as tolerated  Medication as prescribed  Make sure to follow-up with your surgeon in the next 24 to 48 hours  Worse?   Return to ER

## 2022-04-01 ENCOUNTER — HOSPITAL ENCOUNTER (OUTPATIENT)
Dept: PREADMISSION TESTING | Age: 67
Discharge: HOME | End: 2022-04-01
Payer: MEDICARE

## 2022-04-01 ENCOUNTER — TRANSCRIBE ORDER (OUTPATIENT)
Dept: REGISTRATION | Age: 67
End: 2022-04-01

## 2022-04-01 DIAGNOSIS — K43.2 RECURRENT VENTRAL HERNIA: Primary | ICD-10-CM

## 2022-04-01 DIAGNOSIS — K43.2 RECURRENT VENTRAL HERNIA: ICD-10-CM

## 2022-04-01 LAB
ANION GAP SERPL CALC-SCNC: 4 MMOL/L (ref 3–18)
ATRIAL RATE: 72 BPM
BASOPHILS # BLD: 0 K/UL (ref 0–0.1)
BASOPHILS NFR BLD: 0 % (ref 0–2)
BUN SERPL-MCNC: 18 MG/DL (ref 7–18)
BUN/CREAT SERPL: 23 (ref 12–20)
CALCIUM SERPL-MCNC: 9.2 MG/DL (ref 8.5–10.1)
CALCULATED P AXIS, ECG09: 56 DEGREES
CALCULATED R AXIS, ECG10: 60 DEGREES
CALCULATED T AXIS, ECG11: 3 DEGREES
CHLORIDE SERPL-SCNC: 107 MMOL/L (ref 100–111)
CO2 SERPL-SCNC: 29 MMOL/L (ref 21–32)
CREAT SERPL-MCNC: 0.8 MG/DL (ref 0.6–1.3)
DIAGNOSIS, 93000: NORMAL
DIFFERENTIAL METHOD BLD: ABNORMAL
EOSINOPHIL # BLD: 0.2 K/UL (ref 0–0.4)
EOSINOPHIL NFR BLD: 3 % (ref 0–5)
ERYTHROCYTE [DISTWIDTH] IN BLOOD BY AUTOMATED COUNT: 14.7 % (ref 11.6–14.5)
GLUCOSE SERPL-MCNC: 110 MG/DL (ref 74–99)
HCT VFR BLD AUTO: 45.3 % (ref 36–48)
HGB BLD-MCNC: 14.7 G/DL (ref 13–16)
IMM GRANULOCYTES # BLD AUTO: 0 K/UL (ref 0–0.04)
IMM GRANULOCYTES NFR BLD AUTO: 0 % (ref 0–0.5)
LYMPHOCYTES # BLD: 1.7 K/UL (ref 0.9–3.6)
LYMPHOCYTES NFR BLD: 27 % (ref 21–52)
MCH RBC QN AUTO: 29.1 PG (ref 24–34)
MCHC RBC AUTO-ENTMCNC: 32.5 G/DL (ref 31–37)
MCV RBC AUTO: 89.7 FL (ref 78–100)
MONOCYTES # BLD: 0.8 K/UL (ref 0.05–1.2)
MONOCYTES NFR BLD: 12 % (ref 3–10)
NEUTS SEG # BLD: 3.5 K/UL (ref 1.8–8)
NEUTS SEG NFR BLD: 57 % (ref 40–73)
NRBC # BLD: 0 K/UL (ref 0–0.01)
NRBC BLD-RTO: 0 PER 100 WBC
P-R INTERVAL, ECG05: 150 MS
PLATELET # BLD AUTO: 196 K/UL (ref 135–420)
PMV BLD AUTO: 10.3 FL (ref 9.2–11.8)
POTASSIUM SERPL-SCNC: 4.6 MMOL/L (ref 3.5–5.5)
Q-T INTERVAL, ECG07: 382 MS
QRS DURATION, ECG06: 82 MS
QTC CALCULATION (BEZET), ECG08: 418 MS
RBC # BLD AUTO: 5.05 M/UL (ref 4.35–5.65)
SODIUM SERPL-SCNC: 140 MMOL/L (ref 136–145)
VENTRICULAR RATE, ECG03: 72 BPM
WBC # BLD AUTO: 6.2 K/UL (ref 4.6–13.2)

## 2022-04-01 PROCEDURE — 85025 COMPLETE CBC W/AUTO DIFF WBC: CPT

## 2022-04-01 PROCEDURE — 93005 ELECTROCARDIOGRAM TRACING: CPT

## 2022-04-01 PROCEDURE — 80048 BASIC METABOLIC PNL TOTAL CA: CPT

## 2022-04-01 PROCEDURE — 36415 COLL VENOUS BLD VENIPUNCTURE: CPT

## 2022-04-06 ENCOUNTER — HOSPITAL ENCOUNTER (OUTPATIENT)
Dept: PREADMISSION TESTING | Age: 67
Discharge: HOME | End: 2022-04-06

## 2022-04-06 VITALS — WEIGHT: 247 LBS | BODY MASS INDEX: 33.46 KG/M2 | HEIGHT: 72 IN

## 2022-04-06 RX ORDER — CEFAZOLIN SODIUM/WATER 2 G/20 ML
2 SYRINGE (ML) INTRAVENOUS ONCE
Status: CANCELLED | OUTPATIENT
Start: 2022-04-13 | End: 2022-04-13

## 2022-04-06 RX ORDER — CHOLECALCIFEROL (VITAMIN D3) 50 MCG
CAPSULE ORAL DAILY
COMMUNITY

## 2022-04-06 RX ORDER — SODIUM CHLORIDE, SODIUM LACTATE, POTASSIUM CHLORIDE, CALCIUM CHLORIDE 600; 310; 30; 20 MG/100ML; MG/100ML; MG/100ML; MG/100ML
125 INJECTION, SOLUTION INTRAVENOUS CONTINUOUS
Status: CANCELLED | OUTPATIENT
Start: 2022-04-06

## 2022-04-06 NOTE — PERIOP NOTES
Leave all valuables at home or loved ones;to include wallets/purse, money/credit cards, electronics  such as laptops and tablets. If you want to have your prescriptions filled here, please have some form of payment with your . Please arrange for your transportation home Hold supplements 2 weeks prior to [place]. Denies any prosthetics. Patient states that the family physician is not aware of upcoming procedure. Stop nsaids 7 days prior to [place]. Do not put any lotion, jewelry, makeup, fingernail or toenail polish; no wigs, no private piercings; no tictac,gum and mouthwash. Denies sleep apnea. Denies family history of anesthesia complications. Please be aware that due to unforeseen circumstances, delays may occur and your patience will be appreciated. If you ae scheduled to be discharged the same day, please plan to be with us for most of the day. If an inpatient, room assignments may be delayed as well. Our priority is to make you as comfortable as possible and to keep your family informed of your status when possible. Denies shortness of breath nor chest pain while climbing stairs.  No dnr

## 2022-04-12 ENCOUNTER — ANESTHESIA EVENT (OUTPATIENT)
Dept: SURGERY | Age: 67
End: 2022-04-12
Payer: MEDICARE

## 2022-04-13 ENCOUNTER — ANESTHESIA (OUTPATIENT)
Dept: SURGERY | Age: 67
End: 2022-04-13
Payer: MEDICARE

## 2022-04-13 ENCOUNTER — HOSPITAL ENCOUNTER (OUTPATIENT)
Age: 67
Discharge: HOME | End: 2022-04-14
Attending: SURGERY | Admitting: SURGERY
Payer: MEDICARE

## 2022-04-13 DIAGNOSIS — K43.2 INCISIONAL HERNIA, WITHOUT OBSTRUCTION OR GANGRENE: Primary | ICD-10-CM

## 2022-04-13 PROCEDURE — 77030010507 HC ADH SKN DERMBND J&J -B: Performed by: SURGERY

## 2022-04-13 PROCEDURE — 77030013079 HC BLNKT BAIR HGGR 3M -A: Performed by: SPECIALIST

## 2022-04-13 PROCEDURE — 77030020782 HC GWN BAIR PAWS FLX 3M -B: Performed by: SURGERY

## 2022-04-13 PROCEDURE — 77030040506 HC DRN WND MDII -A: Performed by: SURGERY

## 2022-04-13 PROCEDURE — 74011250636 HC RX REV CODE- 250/636: Performed by: NURSE ANESTHETIST, CERTIFIED REGISTERED

## 2022-04-13 PROCEDURE — 77030003029 HC SUT VCRL J&J -B: Performed by: SURGERY

## 2022-04-13 PROCEDURE — 74011000250 HC RX REV CODE- 250: Performed by: NURSE ANESTHETIST, CERTIFIED REGISTERED

## 2022-04-13 PROCEDURE — 76060000035 HC ANESTHESIA 2 TO 2.5 HR: Performed by: SURGERY

## 2022-04-13 PROCEDURE — 77030036554: Performed by: SURGERY

## 2022-04-13 PROCEDURE — 77030006643: Performed by: SPECIALIST

## 2022-04-13 PROCEDURE — 74011000250 HC RX REV CODE- 250: Performed by: SPECIALIST

## 2022-04-13 PROCEDURE — 77030002933 HC SUT MCRYL J&J -A: Performed by: SURGERY

## 2022-04-13 PROCEDURE — 74011250636 HC RX REV CODE- 250/636: Performed by: SURGERY

## 2022-04-13 PROCEDURE — 77030008477 HC STYL SATN SLP COVD -A: Performed by: SPECIALIST

## 2022-04-13 PROCEDURE — 77030040361 HC SLV COMPR DVT MDII -B: Performed by: SURGERY

## 2022-04-13 PROCEDURE — 77030002966 HC SUT PDS J&J -A: Performed by: SURGERY

## 2022-04-13 PROCEDURE — 2709999900 HC NON-CHARGEABLE SUPPLY: Performed by: SURGERY

## 2022-04-13 PROCEDURE — 76210000063 HC OR PH I REC FIRST 0.5 HR: Performed by: SURGERY

## 2022-04-13 PROCEDURE — 74011250637 HC RX REV CODE- 250/637: Performed by: SURGERY

## 2022-04-13 PROCEDURE — 77030031139 HC SUT VCRL2 J&J -A: Performed by: SURGERY

## 2022-04-13 PROCEDURE — 74011000258 HC RX REV CODE- 258: Performed by: SURGERY

## 2022-04-13 PROCEDURE — 74011000250 HC RX REV CODE- 250: Performed by: SURGERY

## 2022-04-13 PROCEDURE — 76010000131 HC OR TIME 2 TO 2.5 HR: Performed by: SURGERY

## 2022-04-13 PROCEDURE — 77010033678 HC OXYGEN DAILY

## 2022-04-13 PROCEDURE — 77030008683 HC TU ET CUF COVD -A: Performed by: SPECIALIST

## 2022-04-13 PROCEDURE — C1781 MESH (IMPLANTABLE): HCPCS | Performed by: SURGERY

## 2022-04-13 PROCEDURE — C9290 INJ, BUPIVACAINE LIPOSOME: HCPCS | Performed by: SURGERY

## 2022-04-13 PROCEDURE — G0378 HOSPITAL OBSERVATION PER HR: HCPCS

## 2022-04-13 PROCEDURE — 77030002916 HC SUT ETHLN J&J -A: Performed by: SURGERY

## 2022-04-13 DEVICE — MESH SURG 20X15CM RECT PARIETEX PROGRIP: Type: IMPLANTABLE DEVICE | Site: ABDOMEN | Status: FUNCTIONAL

## 2022-04-13 RX ORDER — SODIUM CHLORIDE 0.9 % (FLUSH) 0.9 %
5-40 SYRINGE (ML) INJECTION AS NEEDED
Status: DISCONTINUED | OUTPATIENT
Start: 2022-04-13 | End: 2022-04-14 | Stop reason: HOSPADM

## 2022-04-13 RX ORDER — SODIUM CHLORIDE 0.9 % (FLUSH) 0.9 %
5-40 SYRINGE (ML) INJECTION EVERY 8 HOURS
Status: DISCONTINUED | OUTPATIENT
Start: 2022-04-13 | End: 2022-04-14 | Stop reason: HOSPADM

## 2022-04-13 RX ORDER — FENTANYL CITRATE 50 UG/ML
25 INJECTION, SOLUTION INTRAMUSCULAR; INTRAVENOUS
Status: DISCONTINUED | OUTPATIENT
Start: 2022-04-13 | End: 2022-04-13 | Stop reason: HOSPADM

## 2022-04-13 RX ORDER — SODIUM CHLORIDE, SODIUM LACTATE, POTASSIUM CHLORIDE, CALCIUM CHLORIDE 600; 310; 30; 20 MG/100ML; MG/100ML; MG/100ML; MG/100ML
50 INJECTION, SOLUTION INTRAVENOUS CONTINUOUS
Status: DISCONTINUED | OUTPATIENT
Start: 2022-04-13 | End: 2022-04-13 | Stop reason: HOSPADM

## 2022-04-13 RX ORDER — LIDOCAINE HYDROCHLORIDE 20 MG/ML
INJECTION, SOLUTION EPIDURAL; INFILTRATION; INTRACAUDAL; PERINEURAL AS NEEDED
Status: DISCONTINUED | OUTPATIENT
Start: 2022-04-13 | End: 2022-04-13 | Stop reason: HOSPADM

## 2022-04-13 RX ORDER — BUPIVACAINE HYDROCHLORIDE 2.5 MG/ML
INJECTION, SOLUTION EPIDURAL; INFILTRATION; INTRACAUDAL AS NEEDED
Status: DISCONTINUED | OUTPATIENT
Start: 2022-04-13 | End: 2022-04-13 | Stop reason: HOSPADM

## 2022-04-13 RX ORDER — CEFAZOLIN SODIUM/WATER 2 G/20 ML
2 SYRINGE (ML) INTRAVENOUS ONCE
Status: DISCONTINUED | OUTPATIENT
Start: 2022-04-13 | End: 2022-04-13 | Stop reason: HOSPADM

## 2022-04-13 RX ORDER — KETOROLAC TROMETHAMINE 15 MG/ML
INJECTION, SOLUTION INTRAMUSCULAR; INTRAVENOUS AS NEEDED
Status: DISCONTINUED | OUTPATIENT
Start: 2022-04-13 | End: 2022-04-13 | Stop reason: HOSPADM

## 2022-04-13 RX ORDER — HYDROMORPHONE HYDROCHLORIDE 1 MG/ML
1 INJECTION, SOLUTION INTRAMUSCULAR; INTRAVENOUS; SUBCUTANEOUS
Status: DISCONTINUED | OUTPATIENT
Start: 2022-04-13 | End: 2022-04-14 | Stop reason: HOSPADM

## 2022-04-13 RX ORDER — DEXTROSE, SODIUM CHLORIDE, AND POTASSIUM CHLORIDE 5; .45; .15 G/100ML; G/100ML; G/100ML
75 INJECTION INTRAVENOUS CONTINUOUS
Status: DISCONTINUED | OUTPATIENT
Start: 2022-04-13 | End: 2022-04-14 | Stop reason: HOSPADM

## 2022-04-13 RX ORDER — GLYCOPYRROLATE 0.2 MG/ML
INJECTION INTRAMUSCULAR; INTRAVENOUS AS NEEDED
Status: DISCONTINUED | OUTPATIENT
Start: 2022-04-13 | End: 2022-04-13 | Stop reason: HOSPADM

## 2022-04-13 RX ORDER — HYDROMORPHONE HYDROCHLORIDE 2 MG/ML
INJECTION, SOLUTION INTRAMUSCULAR; INTRAVENOUS; SUBCUTANEOUS AS NEEDED
Status: DISCONTINUED | OUTPATIENT
Start: 2022-04-13 | End: 2022-04-13 | Stop reason: HOSPADM

## 2022-04-13 RX ORDER — ONDANSETRON 2 MG/ML
4 INJECTION INTRAMUSCULAR; INTRAVENOUS
Status: DISCONTINUED | OUTPATIENT
Start: 2022-04-13 | End: 2022-04-14 | Stop reason: HOSPADM

## 2022-04-13 RX ORDER — OXYCODONE AND ACETAMINOPHEN 5; 325 MG/1; MG/1
1 TABLET ORAL AS NEEDED
Status: DISCONTINUED | OUTPATIENT
Start: 2022-04-13 | End: 2022-04-13 | Stop reason: HOSPADM

## 2022-04-13 RX ORDER — MIDAZOLAM HYDROCHLORIDE 1 MG/ML
INJECTION, SOLUTION INTRAMUSCULAR; INTRAVENOUS AS NEEDED
Status: DISCONTINUED | OUTPATIENT
Start: 2022-04-13 | End: 2022-04-13 | Stop reason: HOSPADM

## 2022-04-13 RX ORDER — FENTANYL CITRATE 50 UG/ML
INJECTION, SOLUTION INTRAMUSCULAR; INTRAVENOUS AS NEEDED
Status: DISCONTINUED | OUTPATIENT
Start: 2022-04-13 | End: 2022-04-13 | Stop reason: HOSPADM

## 2022-04-13 RX ORDER — OXYCODONE AND ACETAMINOPHEN 5; 325 MG/1; MG/1
1 TABLET ORAL
Status: DISCONTINUED | OUTPATIENT
Start: 2022-04-13 | End: 2022-04-14 | Stop reason: HOSPADM

## 2022-04-13 RX ORDER — HYDROMORPHONE HYDROCHLORIDE 1 MG/ML
0.5 INJECTION, SOLUTION INTRAMUSCULAR; INTRAVENOUS; SUBCUTANEOUS
Status: DISCONTINUED | OUTPATIENT
Start: 2022-04-13 | End: 2022-04-13 | Stop reason: HOSPADM

## 2022-04-13 RX ORDER — ROCURONIUM BROMIDE 10 MG/ML
INJECTION, SOLUTION INTRAVENOUS AS NEEDED
Status: DISCONTINUED | OUTPATIENT
Start: 2022-04-13 | End: 2022-04-13 | Stop reason: HOSPADM

## 2022-04-13 RX ORDER — SODIUM CHLORIDE, SODIUM LACTATE, POTASSIUM CHLORIDE, CALCIUM CHLORIDE 600; 310; 30; 20 MG/100ML; MG/100ML; MG/100ML; MG/100ML
125 INJECTION, SOLUTION INTRAVENOUS CONTINUOUS
Status: DISCONTINUED | OUTPATIENT
Start: 2022-04-13 | End: 2022-04-13

## 2022-04-13 RX ORDER — ONDANSETRON 2 MG/ML
4 INJECTION INTRAMUSCULAR; INTRAVENOUS ONCE
Status: DISCONTINUED | OUTPATIENT
Start: 2022-04-13 | End: 2022-04-13 | Stop reason: HOSPADM

## 2022-04-13 RX ORDER — NEOSTIGMINE METHYLSULFATE 1 MG/ML
INJECTION, SOLUTION INTRAVENOUS AS NEEDED
Status: DISCONTINUED | OUTPATIENT
Start: 2022-04-13 | End: 2022-04-13 | Stop reason: HOSPADM

## 2022-04-13 RX ORDER — ATORVASTATIN CALCIUM 10 MG/1
10 TABLET, FILM COATED ORAL DAILY
Status: DISCONTINUED | OUTPATIENT
Start: 2022-04-14 | End: 2022-04-14 | Stop reason: HOSPADM

## 2022-04-13 RX ORDER — PROPOFOL 10 MG/ML
INJECTION, EMULSION INTRAVENOUS AS NEEDED
Status: DISCONTINUED | OUTPATIENT
Start: 2022-04-13 | End: 2022-04-13 | Stop reason: HOSPADM

## 2022-04-13 RX ORDER — DEXAMETHASONE SODIUM PHOSPHATE 4 MG/ML
INJECTION, SOLUTION INTRA-ARTICULAR; INTRALESIONAL; INTRAMUSCULAR; INTRAVENOUS; SOFT TISSUE AS NEEDED
Status: DISCONTINUED | OUTPATIENT
Start: 2022-04-13 | End: 2022-04-13 | Stop reason: HOSPADM

## 2022-04-13 RX ORDER — ACETAMINOPHEN 325 MG/1
650 TABLET ORAL
Status: DISCONTINUED | OUTPATIENT
Start: 2022-04-13 | End: 2022-04-14 | Stop reason: HOSPADM

## 2022-04-13 RX ORDER — NALOXONE HYDROCHLORIDE 0.4 MG/ML
0.1 INJECTION, SOLUTION INTRAMUSCULAR; INTRAVENOUS; SUBCUTANEOUS
Status: DISCONTINUED | OUTPATIENT
Start: 2022-04-13 | End: 2022-04-13 | Stop reason: HOSPADM

## 2022-04-13 RX ORDER — ONDANSETRON 2 MG/ML
INJECTION INTRAMUSCULAR; INTRAVENOUS AS NEEDED
Status: DISCONTINUED | OUTPATIENT
Start: 2022-04-13 | End: 2022-04-13 | Stop reason: HOSPADM

## 2022-04-13 RX ORDER — EPHEDRINE SULFATE/0.9% NACL/PF 50 MG/5 ML
SYRINGE (ML) INTRAVENOUS AS NEEDED
Status: DISCONTINUED | OUTPATIENT
Start: 2022-04-13 | End: 2022-04-13 | Stop reason: HOSPADM

## 2022-04-13 RX ADMIN — DEXAMETHASONE SODIUM PHOSPHATE 4 MG: 4 INJECTION, SOLUTION INTRAMUSCULAR; INTRAVENOUS at 09:48

## 2022-04-13 RX ADMIN — OXYCODONE AND ACETAMINOPHEN 1 TABLET: 5; 325 TABLET ORAL at 13:37

## 2022-04-13 RX ADMIN — DEXTROSE MONOHYDRATE, SODIUM CHLORIDE, AND POTASSIUM CHLORIDE 75 ML/HR: 50; 4.5; 1.49 INJECTION, SOLUTION INTRAVENOUS at 13:37

## 2022-04-13 RX ADMIN — ONDANSETRON HYDROCHLORIDE 4 MG: 2 INJECTION INTRAMUSCULAR; INTRAVENOUS at 10:59

## 2022-04-13 RX ADMIN — PROPOFOL 200 MG: 10 INJECTION, EMULSION INTRAVENOUS at 09:31

## 2022-04-13 RX ADMIN — HYDROMORPHONE HYDROCHLORIDE 0.25 MG: 2 INJECTION, SOLUTION INTRAMUSCULAR; INTRAVENOUS; SUBCUTANEOUS at 10:48

## 2022-04-13 RX ADMIN — Medication 3 MG: at 11:10

## 2022-04-13 RX ADMIN — HYDROMORPHONE HYDROCHLORIDE 0.25 MG: 2 INJECTION, SOLUTION INTRAMUSCULAR; INTRAVENOUS; SUBCUTANEOUS at 09:55

## 2022-04-13 RX ADMIN — SODIUM CHLORIDE, SODIUM LACTATE, POTASSIUM CHLORIDE, AND CALCIUM CHLORIDE 125 ML/HR: 600; 310; 30; 20 INJECTION, SOLUTION INTRAVENOUS at 07:59

## 2022-04-13 RX ADMIN — ROCURONIUM BROMIDE 50 MG: 10 INJECTION, SOLUTION INTRAVENOUS at 09:31

## 2022-04-13 RX ADMIN — FENTANYL CITRATE 50 MCG: 50 INJECTION, SOLUTION INTRAMUSCULAR; INTRAVENOUS at 09:28

## 2022-04-13 RX ADMIN — Medication 10 MG: at 10:17

## 2022-04-13 RX ADMIN — KETOROLAC TROMETHAMINE 15 MG: 15 INJECTION, SOLUTION INTRAMUSCULAR; INTRAVENOUS at 11:01

## 2022-04-13 RX ADMIN — ROCURONIUM BROMIDE 10 MG: 10 INJECTION, SOLUTION INTRAVENOUS at 10:35

## 2022-04-13 RX ADMIN — MIDAZOLAM 2 MG: 1 INJECTION INTRAMUSCULAR; INTRAVENOUS at 09:24

## 2022-04-13 RX ADMIN — FENTANYL CITRATE 50 MCG: 50 INJECTION, SOLUTION INTRAMUSCULAR; INTRAVENOUS at 09:39

## 2022-04-13 RX ADMIN — LIDOCAINE HYDROCHLORIDE 100 MG: 20 INJECTION, SOLUTION INTRAVENOUS at 09:28

## 2022-04-13 RX ADMIN — HYDROMORPHONE HYDROCHLORIDE 0.25 MG: 2 INJECTION, SOLUTION INTRAMUSCULAR; INTRAVENOUS; SUBCUTANEOUS at 10:35

## 2022-04-13 RX ADMIN — HYDROMORPHONE HYDROCHLORIDE 0.25 MG: 2 INJECTION, SOLUTION INTRAMUSCULAR; INTRAVENOUS; SUBCUTANEOUS at 10:04

## 2022-04-13 RX ADMIN — GLYCOPYRROLATE 0.4 MG: 0.2 INJECTION INTRAMUSCULAR; INTRAVENOUS at 11:10

## 2022-04-13 RX ADMIN — Medication 10 MG: at 10:27

## 2022-04-13 NOTE — H&P
Assessment/Plan  #  Detail Type  Description   1. Assessment  Incisional hernia, without obstruction or gangrene (K43.2). Patient Plan  Complex large hernia with upper diastasis. Will require open repair, retro rectus repair with myofascial releases. Bowel prep. I have discussed the risks benefits and alternatives of the procedure to the patient including bleeding, infection, use of mesh, myofascial release, chronic post op pain, reason and side effects of nerve resections, recurrence . They understand and wish to proceed. 2.  Assessment  Body mass index (BMI) 34.0-34.9, adult (Z68.34). Plan Orders  Today's instructions / counseling include(s) Lifestyle education regarding diet. Pain Management Plan  Pain Scale: 0/10. Method: Numeric Pain Intensity Scale. This 61year old male presents for Hernia. History of Present Illness  1. Hernia   Additional information: Large incisional hernia and previous more recent colectomy. .        Problem List  Problem List reviewed. Problem Description  Onset Date  Chronic  Clinical Status  Notes  Abdomen hernia    N          Past Medical/Surgical History   (Detailed)  Disease/disorder  Onset Date  Management  Date  Comments      Appendectomy  1968        Family History   (Detailed)    Relationship  Family Member Name    Age at Death  Condition  Onset Age  Cause of Death  Brother    N    Alive and well      Father    N    Alive and well      Mother    Y    Dementia    N  Sister    N    Alive and well        Social History  (Detailed)  Tobacco use reviewed. Preferred language is [place]. Marital Status/Family/Social Support  Marital status:     Tobacco use status: Current non-smoker. Smoking status: Never smoker. Tobacco Screening  Patient has never used tobacco. Patient has not used tobacco in the last 30 days. Patient has not used smokeless tobacco in the last 30 days.     Smoking Status  Type  Smoking Status  Usage Per Day  Years Used  Pack Years  Total Pack Years    Never smoker              Medications (active prior to today)  Medication  Instructions  Start Date  Stop Date  Refilled  Elsewhere  atorvastatin 10 mg tablet    05/16/2021      Y    Patient Status   Completed with information received for patient in a summary of care record. Medication Reconciliation  Medications reconciled today. Medication Reviewed  Adherence  Medication Name  Sig Desc  Elsewhere  Status  taking as directed  atorvastatin 10 mg tablet    Y  Verified    Allergies  Ingredient  Reaction (Severity)  Medication Name  Comment  CIPROFLOXACIN    Cipro    CIPROFLOXACIN HCL    Cipro      Reviewed, no changes. Review of Systems  System  Neg/Pos  Details  Constitutional  Negative  Fever, Night sweats and Weight loss. ENMT  Negative  Hearing loss, Tinnitus, Vertigo and Voice change. Eyes  Negative  Diplopia and Vision loss. Respiratory  Negative  Asthma, Cough, Dyspnea, Hemoptysis, Known TB exposure and Wheezing. Cardio  Negative  Chest pain, Claudication, Edema, Irregular heartbeat/palpitations and Thrombophlebitis. GI  Negative  Bloating, Dysphagia, Hemorrhoids, Jaundice and Reflux.   Negative  Dysuria, Nocturia, Passage stone/gravel and Urinary incontinence. Endocrine  Negative  Cold intolerance and Goiter. Neuro  Negative  Focal weakness, Headache, Paresthesia, Seizures and Syncope. Integumentary  Negative  Change in shape/size of mole(s) and Skin lesion. MS  Negative  Back pain, Bone/joint symptoms and Muscle weakness. Hema/Lymph  Negative  Easy bleeding and Easy bruising. Allergic/Immuno  Negative  Contact allergy and Contact dermatitis.       Vital Signs  Height  Time  ft  in  cm  Last Measured  Height Position  9:58 AM  5.0  11.50  181.61  03/29/2022  Standing    Weight/BSA/BMI  Time  lb  oz  kg  Context  BMI kg/m2  BSA m2  9:58 AM  247.60    112.309  dressed with shoes  34.05      Temperature/Pulse/Respiration  Time  Temp F  Temp C  Temp Site  Pulse/min  Pattern  Resp/ min  9:58 AM  97.70  36.50    71  regular      Pulse Oximetry/FIO2  Time  Pulse Ox (Rest %)  Pulse Ox (Amb %)  O2 Sat  O2 L/Min  Timing  FiO2 %  L/min  Delivery Method  Finger Probe  9:58 AM  99    RA            R Index    Pain Scale  Time  Pain Score  Method  9:58 AM  0/10  Numeric Pain Intensity Scale    Measured by  Time  Measured by  9:58 AM  Billie Black    Physical Exam  Exam  Findings  Details  Constitutional  Normal  Well developed. Eyes  Normal  Conjunctiva - Right: Normal, Left: Normal. Pupil - Right: Normal, Left: Normal.  Neck Exam  Normal  Inspection - Normal.  Respiratory  Normal  Inspection - Normal. Auscultation - Normal. Effort - Normal.  Cardiovascular  Normal  Regular rate and rhythm. No murmurs, gallops, or rubs. Vascular  Normal  Pulses - Dorsalis pedis: Normal. Capillary refill - Less than 2 seconds. Abdomen  *  Obese. Abdominal muscles - diastasis recti. Hernia - Positive. Type: incisional. Tanisha umbilical hernia . Abdomen  Normal  No abdominal tenderness. No hepatic enlargement. No spleen enlargement. Genitourinary  Normal  No hernia. Skin  Normal  Inspection - Normal.  Musculoskeletal  Normal  Visual overview of all four extremities is normal.  Extremity  Normal  No edema. Neurological  Normal  Memory - Normal. Cranial nerves - Cranial nerves II through XII grossly intact. Psychiatric  Normal  Orientation - Oriented to time, place, person & situation. Appropriate mood and affect. Normal insight. Normal judgment.         Medications (added, continued, or stopped this visit)  Start Date  Medication  Directions  PRN Status  PRN Reason  Instruction  Stop Date  05/16/2021  atorvastatin 10 mg tablet    N          Active Patient Care Team Members  Name  Contact  Agency Type  Support Role  Relationship  Active Date  Inactive Date  Specialty  N/a N/a      Emergency Contact  Unknown

## 2022-04-13 NOTE — ANESTHESIA PREPROCEDURE EVALUATION
Relevant Problems   HEMATOLOGY   (+) Anemia       Anesthetic History   No history of anesthetic complications            Review of Systems / Medical History  Patient summary reviewed, nursing notes reviewed and pertinent labs reviewed    Pulmonary  Within defined limits                 Neuro/Psych   Within defined limits           Cardiovascular              Hyperlipidemia    Exercise tolerance: >4 METS     GI/Hepatic/Renal  Within defined limits              Endo/Other  Within defined limits           Other Findings              Physical Exam    Airway  Mallampati: III  TM Distance: 4 - 6 cm  Neck ROM: normal range of motion   Mouth opening: Normal     Cardiovascular               Dental  No notable dental hx       Pulmonary                 Abdominal         Other Findings            Anesthetic Plan    ASA: 2  Anesthesia type: general          Induction: Intravenous  Anesthetic plan and risks discussed with: Patient      ETT per surgeon

## 2022-04-13 NOTE — PROGRESS NOTES
Bedside and Verbal shift change report given to NARESH Ibanez RN  Report included the following information SBAR, Kardex, Procedure Summary, Intake/Output, MAR and Recent Results.

## 2022-04-13 NOTE — PERIOP NOTES
Reviewed PTA medication list with patient/caregiver and patient/caregiver denies any additional medications. Patient admits to having a responsible adult care for them at home for at least 24 hours after surgery. Patient encouraged to use gown warming system and informed that using said warming gown to regulate body temperature prior to a procedure has been shown to help reduce the risks of blood clots and infection. Patient's pharmacy of choice verified and documented in PTA medication section.     Dual skin assessment & fall risk band verification completed with Clyde Strong RN.

## 2022-04-13 NOTE — BRIEF OP NOTE
Brief Postoperative Note    Patient: Merrill Ceja  YOB: 1955  MRN: [id number]    Date of Procedure: 4/13/2022     Pre-Op Diagnosis: INCISIONAL HERNIA    Post-Op Diagnosis: Same as preoperative diagnosis. Procedure(s):  OPEN REPAIR INCISIONAL HERNIA,MYOFASCIAL RELEASES    Surgeon(s): Lucy Clark MD    Surgical Assistant: Surg Asst-1: Chi Cantor    Anesthesia: General     Estimated Blood Loss (mL): Minimal    Complications: None    Specimens: * No specimens in log *     Implants:   Implant Name Type Inv. Item Serial No.  Lot No. LRB No. Used Action   MESH SURG [id number] [employer] Tom [id number]  MESH SURG [id number] RECT [address] [employer] SURGICAL_ [id number] N/A 1 Implanted       Drains:   Carloz-Robledo Drain 04/13/22 Right; Lower Abdomen (Active)       Findings: inc hernia    Electronically Signed by Cornelia Salomon MD on 4/13/2022 at 11:02 AM

## 2022-04-13 NOTE — PROGRESS NOTES
Pt received via bed oriented to room call bell and remote. Dual Skin assessment completed with Marily Ace.

## 2022-04-13 NOTE — PROGRESS NOTES
Oral and Written notification given to patient and/or caregiver informing them that they are currently an Outpatient receiving care in our facility. Outpatient services include Observation Services. Transition of Care (SILVINO) Plan:          Pt admitted for an elective surgical procedure - patient had open repair of incisional hernia, myofascial releases on 4/13/22. Pt is independent, lives with spouse Kenyatta Patel; works as a [employer]. Please encourage ambulation. No transition of care needs identified at this time. Anticipate pt will be medically stable for discharge within the next 24-48 hours with physician follow up. CM available to assist as needed. SILVINO Transportation:   How is patient being transported at discharge? Family/Friend      When? Once cleared by physician     Is transport scheduled? N/A      Follow-up appointment and transportation:   PCP/Specialist?  See AVS for Appointment         Who is transporting to the follow-up appointment? Self/Family/Friend      Is transport for follow up appointment scheduled? N/A    Communication plan (with patient/family): Who is being called? Patient or Next of Kin? Responsible party? Patient      What number(s) is to be used? See Facesheet      What service provider is calling for [facility] services? When are they calling? Readmission Risk? (Green/Low; Yellow/Moderate; Red/High):  [employer] here to complete [address] including selection of the Healthcare Decision Maker Relationship (ie \"Primary\")    James Yee, spouse - Primary - [phone]    Care Management Interventions  PCP Verified by CM:  Yes  Mode of Transport at Discharge: Self  Transition of Care Consult (CM Consult): Discharge Planning  Support Systems: Spouse/Significant Other  Confirm Follow Up Transport: Family  The Plan for Transition of Care is Related to the Following Treatment Goals : open repair incisional hernia  The Patient and/or Patient Representative was Provided with a Choice of Provider and Agrees with the Discharge Plan?: Yes  Name of the Patient Representative Who was Provided with a Choice of Provider and Agrees with the Discharge Plan: [facility], patient  Discharge Location  Patient Expects to be Discharged to[de-identified] Home with family assistance

## 2022-04-13 NOTE — PROGRESS NOTES
TRANSFER - IN REPORT:    Verbal report received from Domingo Mary on [employer]  [facility]    Report consisted of patients Situation, Background, Assessment and   Recommendations(SBAR). Information from the following report(s) SBAR, Kardex, Procedure Summary, Intake/Output, MAR and Recent Results was reviewed with the receiving nurse. Opportunity for questions and clarification was provided. Assessment  to be completed upon patients arrival to unit and care assumed.

## 2022-04-14 VITALS
WEIGHT: 240.6 LBS | HEART RATE: 82 BPM | SYSTOLIC BLOOD PRESSURE: 178 MMHG | RESPIRATION RATE: 18 BRPM | HEIGHT: 72 IN | BODY MASS INDEX: 32.59 KG/M2 | TEMPERATURE: 98.5 F | DIASTOLIC BLOOD PRESSURE: 82 MMHG | OXYGEN SATURATION: 96 %

## 2022-04-14 PROCEDURE — 74011250636 HC RX REV CODE- 250/636: Performed by: SURGERY

## 2022-04-14 PROCEDURE — G0378 HOSPITAL OBSERVATION PER HR: HCPCS

## 2022-04-14 PROCEDURE — 74011250637 HC RX REV CODE- 250/637: Performed by: SURGERY

## 2022-04-14 RX ORDER — POLYETHYLENE GLYCOL 3350 17 G/17G
17 POWDER, FOR SOLUTION ORAL ONCE
Status: DISCONTINUED | OUTPATIENT
Start: 2022-04-14 | End: 2022-04-14 | Stop reason: HOSPADM

## 2022-04-14 RX ORDER — OXYCODONE AND ACETAMINOPHEN 5; 325 MG/1; MG/1
1 TABLET ORAL
Qty: 20 TABLET | Refills: 0 | Status: SHIPPED | OUTPATIENT
Start: 2022-04-14 | End: 2022-04-19

## 2022-04-14 RX ADMIN — ACETAMINOPHEN 650 MG: 325 TABLET ORAL at 01:43

## 2022-04-14 RX ADMIN — ATORVASTATIN CALCIUM 10 MG: 10 TABLET, FILM COATED ORAL at 08:56

## 2022-04-14 RX ADMIN — DEXTROSE MONOHYDRATE, SODIUM CHLORIDE, AND POTASSIUM CHLORIDE 75 ML/HR: 50; 4.5; 1.49 INJECTION, SOLUTION INTRAVENOUS at 01:44

## 2022-04-14 NOTE — ROUTINE PROCESS
Bedside and Verbal shift change report given to Bridgette Joyce RN by Stefano Carvajal. Report included the following information SBAR, Kardex, OR Summary, Intake/Output and MAR.

## 2022-04-14 NOTE — PROGRESS NOTES
26 - [facility]side [facility] from [facility], [facility]. Patient in bed. Pain 2/10.     2025 - Patient in bed at this time. IV to [address]  intact and patent. Sequential compression device bilaterally. Trochar sites to abd dry and intact. + CMS. Pt A & O x 4. LS clear, on RA. Abdomen soft, tender and ND. KEANU with sero sang drge. + BS to all 4 quadrants. Denies nausea. Pain 2/10. Call light within reach. 0640-Pt denies need for pain meds, only took Tylenol for a headache. Pt had an uneventful shift. Uses IS every hour while awake. Pain remained well-controlled. No issues/concerns at this time.  Call bell within reach

## 2022-04-14 NOTE — PROGRESS NOTES
4278  Assumed care of pt at this time. Assessment complete. Pt alert and oriented x 4. Shows no sign of distress. Fall risk arm band in place. Denies SOB and chest pain. Pt lungs clear/slight diminished at bases  bilaterally. Cap refill  less than 3 seconds. Pt denies numbness and tingling to all extremities. Stated pain 1/10. Pt has 20 G IV to L hand. Pt has ABD binder in place CDI with j/p  in place dressing . scds in place  Incentive spirometer at bedside. Pt encouraged to continue use of IS. Pt verbalized understanding. Ice pack applied. Call light and possessions within reach. Bed locked and in low position. Will continue to monitor. 3101  Telephone order with readback for one dose of miralax. 7620  Dr Brenda Steiner aware pt feeling well and ambulating in room. Dr Brenda Steiner states he wants to d/c pt will call prescriptions to pharmacy and he is to d/c home with drain follow with Dr Brenda Steiner in one week. Nurse will make pt aware     1017  Pt refuses miralax states he has some at home he will take. He also states had small liquid BM this morning     1029  Clarified with Dr Brenda Steiner pt can take off ABD binder at times and he may shower. Nurse will update pt.     1038  Dual AVS reviewed with BISHNU Mendieta rn . All medications reviewed individually with patient. Opportunities for questions and concerns provided. Patient to be discharged via (mode of transport ie. Car, ambulance or air transport) car. Patient's arm band appropriately discarded. IV removed. J/p drain in place. Measuring cup for j/p output given to pt.

## 2022-04-14 NOTE — DISCHARGE INSTRUCTIONS
Post-Operative Discharge Instructions  Ismael Wheeler. Hasmukh Bañuelos M.D.  [address] [place] [address], [place]  [phone]    Patient: Leticia Walters MRN: [id number]  CSN: [id number]    YOB: 1955  Age: 74 y.o. Sex: male    DOA: 4/13/2022 LOS:  LOS: 0 days   Discharge Date:      Acute Diagnoses:  Incisional hernia [K43.2]    Chronic Medical Diagnoses:  Problem List as of 4/14/2022 Date Reviewed: 4/13/2022          Codes Class Noted - Resolved    Incisional hernia ICD-10-CM: K43.2  ICD-9-CM: 553.21  4/13/2022 - Present        Anemia ICD-10-CM: D64.9  ICD-9-CM: 285.9  8/26/2021 - Present        GI bleed ICD-10-CM: K92.2  ICD-9-CM: 578.9  8/26/2021 - Present        Diarrhea ICD-10-CM: R19.7  ICD-9-CM: 787.91  8/21/2021 - Present        Hypokalemia ICD-10-CM: E87.6  ICD-9-CM: 276.8  8/21/2021 - Present        Hypomagnesemia ICD-10-CM: E83.42  ICD-9-CM: 275.2  8/21/2021 - Present        Hypophosphatemia ICD-10-CM: E83.39  ICD-9-CM: 275.3  8/21/2021 - Present        Diverticulitis of large intestine with abscess ICD-10-CM: K57.20  ICD-9-CM: 562.11, 569.5  8/16/2021 - Present        Abscess of sigmoid colon due to diverticulitis ICD-10-CM: K57.20  ICD-9-CM: 562.11, 569.5  7/15/2021 - Present              Diet  1. Resume prior to surgery diet as tolerated. Activity  1. Do not drive a car or operate any hazardous machinery the day of surgery. 2. Rest quietly today. 3. No bending or heavy lifting. 4. You may resume other prior to surgery activities as tolerated. 5. You may remove the bandage and shower in 1 day. Drain / Wound Care  1. Follow all drain / wound care instructions exactly as explained by the Nurse at time of discharge. 2. Apply an ice pack to the surgical site for 48 hours. 3. Do not put any salves or ointments on the wound. Allow it to form a dry scab. 4. Leave steri-strips / Dermabond alone.   They should be allowed to fall off on their own in 7-14 days.    Medications  1. It is important to take your medications exactly as they are prescribed. 2. Keep your medication in the bottles provided by the pharmacist, and keep a list of the medication names, dosages, and times they should be taken in your wallet. Call 911 anytime you think you may need emergency care. For example, call if:  · You passed out (lost consciousness). · You have severe trouble breathing. · You have sudden chest pain and shortness of breath. Notify your Surgeon for any of the followin. Fever, chills, nausea, vomiting, severe abdominal pain or bleeding. 2. If you experience any redness or discharge or sign of infection. 3. Persistent nausea lasting more than 24 hours. If you are unable to reach your Surgeon for any of the symptoms above, you should proceed directly to the nearest Emergency Department. Post-Operative Appointment Information    Call Dr. Lucie Robbins office tomorrow morning at [phone] to schedule a post-operative office visit in one (1) week. If any questions or concerns arise, call your Surgeon at [phone].

## 2022-04-15 ENCOUNTER — APPOINTMENT (OUTPATIENT)
Dept: GENERAL RADIOLOGY | Age: 67
End: 2022-04-15
Attending: PHYSICIAN ASSISTANT
Payer: MEDICARE

## 2022-04-15 ENCOUNTER — APPOINTMENT (OUTPATIENT)
Dept: CT IMAGING | Age: 67
End: 2022-04-15
Attending: PHYSICIAN ASSISTANT
Payer: MEDICARE

## 2022-04-15 ENCOUNTER — HOSPITAL ENCOUNTER (EMERGENCY)
Age: 67
Discharge: HOME | End: 2022-04-15
Attending: EMERGENCY MEDICINE
Payer: MEDICARE

## 2022-04-15 VITALS
OXYGEN SATURATION: 95 % | RESPIRATION RATE: 16 BRPM | HEART RATE: 113 BPM | HEIGHT: 71 IN | WEIGHT: 240 LBS | TEMPERATURE: 97.1 F | SYSTOLIC BLOOD PRESSURE: 171 MMHG | BODY MASS INDEX: 33.6 KG/M2 | DIASTOLIC BLOOD PRESSURE: 83 MMHG

## 2022-04-15 DIAGNOSIS — K59.09 OTHER CONSTIPATION: ICD-10-CM

## 2022-04-15 DIAGNOSIS — G89.18 POST-OP PAIN: Primary | ICD-10-CM

## 2022-04-15 DIAGNOSIS — R11.2 NAUSEA AND VOMITING, UNSPECIFIED VOMITING TYPE: ICD-10-CM

## 2022-04-15 LAB
ALBUMIN SERPL-MCNC: 3.6 G/DL (ref 3.4–5)
ALBUMIN/GLOB SERPL: 0.8 {RATIO} (ref 0.8–1.7)
ALP SERPL-CCNC: 65 U/L (ref 45–117)
ALT SERPL-CCNC: 41 U/L (ref 16–61)
ANION GAP SERPL CALC-SCNC: 3 MMOL/L (ref 3–18)
AST SERPL-CCNC: 39 U/L (ref 10–38)
BASOPHILS # BLD: 0 K/UL (ref 0–0.1)
BASOPHILS NFR BLD: 0 % (ref 0–2)
BILIRUB SERPL-MCNC: 1.2 MG/DL (ref 0.2–1)
BUN SERPL-MCNC: 11 MG/DL (ref 7–18)
BUN/CREAT SERPL: 14 (ref 12–20)
CALCIUM SERPL-MCNC: 9.1 MG/DL (ref 8.5–10.1)
CHLORIDE SERPL-SCNC: 105 MMOL/L (ref 100–111)
CO2 SERPL-SCNC: 26 MMOL/L (ref 21–32)
CREAT SERPL-MCNC: 0.77 MG/DL (ref 0.6–1.3)
DIFFERENTIAL METHOD BLD: ABNORMAL
EOSINOPHIL # BLD: 0.1 K/UL (ref 0–0.4)
EOSINOPHIL NFR BLD: 1 % (ref 0–5)
ERYTHROCYTE [DISTWIDTH] IN BLOOD BY AUTOMATED COUNT: 15.1 % (ref 11.6–14.5)
GLOBULIN SER CALC-MCNC: 4.5 G/DL (ref 2–4)
GLUCOSE SERPL-MCNC: 121 MG/DL (ref 74–99)
HCT VFR BLD AUTO: 47.9 % (ref 36–48)
HGB BLD-MCNC: 15.8 G/DL (ref 13–16)
IMM GRANULOCYTES # BLD AUTO: 0.1 K/UL (ref 0–0.04)
IMM GRANULOCYTES NFR BLD AUTO: 0 % (ref 0–0.5)
LACTATE BLD-SCNC: 1.03 MMOL/L (ref 0.4–2)
LYMPHOCYTES # BLD: 0.6 K/UL (ref 0.9–3.6)
LYMPHOCYTES NFR BLD: 5 % (ref 21–52)
MCH RBC QN AUTO: 29 PG (ref 24–34)
MCHC RBC AUTO-ENTMCNC: 33 G/DL (ref 31–37)
MCV RBC AUTO: 87.9 FL (ref 78–100)
MONOCYTES # BLD: 0.9 K/UL (ref 0.05–1.2)
MONOCYTES NFR BLD: 8 % (ref 3–10)
NEUTS SEG # BLD: 10 K/UL (ref 1.8–8)
NEUTS SEG NFR BLD: 86 % (ref 40–73)
NRBC # BLD: 0 K/UL (ref 0–0.01)
NRBC BLD-RTO: 0 PER 100 WBC
PLATELET # BLD AUTO: 191 K/UL (ref 135–420)
PMV BLD AUTO: 10.8 FL (ref 9.2–11.8)
POTASSIUM SERPL-SCNC: 4.8 MMOL/L (ref 3.5–5.5)
PROT SERPL-MCNC: 8.1 G/DL (ref 6.4–8.2)
RBC # BLD AUTO: 5.45 M/UL (ref 4.35–5.65)
SODIUM SERPL-SCNC: 134 MMOL/L (ref 136–145)
WBC # BLD AUTO: 11.7 K/UL (ref 4.6–13.2)

## 2022-04-15 PROCEDURE — 85025 COMPLETE CBC W/AUTO DIFF WBC: CPT

## 2022-04-15 PROCEDURE — 74011000636 HC RX REV CODE- 636: Performed by: EMERGENCY MEDICINE

## 2022-04-15 PROCEDURE — 99285 EMERGENCY DEPT VISIT HI MDM: CPT

## 2022-04-15 PROCEDURE — 74177 CT ABD & PELVIS W/CONTRAST: CPT

## 2022-04-15 PROCEDURE — 74011250637 HC RX REV CODE- 250/637: Performed by: PHYSICIAN ASSISTANT

## 2022-04-15 PROCEDURE — 74022 RADEX COMPL AQT ABD SERIES: CPT

## 2022-04-15 PROCEDURE — 83605 ASSAY OF LACTIC ACID: CPT

## 2022-04-15 PROCEDURE — 80053 COMPREHEN METABOLIC PANEL: CPT

## 2022-04-15 PROCEDURE — 74011000636 HC RX REV CODE- 636: Performed by: PHYSICIAN ASSISTANT

## 2022-04-15 RX ORDER — ONDANSETRON 4 MG/1
4 TABLET, FILM COATED ORAL
Qty: 12 TABLET | Refills: 0 | Status: SHIPPED | OUTPATIENT
Start: 2022-04-15

## 2022-04-15 RX ORDER — ONDANSETRON 4 MG/1
4 TABLET, ORALLY DISINTEGRATING ORAL
Status: COMPLETED | OUTPATIENT
Start: 2022-04-15 | End: 2022-04-15

## 2022-04-15 RX ADMIN — IOHEXOL: 240 INJECTION, SOLUTION INTRATHECAL; INTRAVASCULAR; INTRAVENOUS; ORAL at 19:22

## 2022-04-15 RX ADMIN — IOPAMIDOL 100 ML: 612 INJECTION, SOLUTION INTRAVENOUS at 19:45

## 2022-04-15 RX ADMIN — ONDANSETRON 4 MG: 4 TABLET, ORALLY DISINTEGRATING ORAL at 23:16

## 2022-04-15 NOTE — ED TRIAGE NOTES
Ambulatory patient arrives with complaints of constipation, last BM yesterday, and vomited today. Wednesday hernia repair. Was told to come to ED via NP on telephone.

## 2022-04-15 NOTE — ED PROVIDER NOTES
EMERGENCY DEPARTMENT HISTORY AND PHYSICAL EXAM    Date: 4/15/2022  Patient Name: Estrellita Correa    History of Presenting Illness     Chief Complaint   Patient presents with    Post-Op Problem         History Provided By: Patient, wife    Chief Complaint: post op problem, vomiting  Duration: yesterday  Timing:    Location: central abdomen  Quality:   Severity:   Modifying Factors:   Associated Symptoms: none       Additional History (Context): Estrellita Correa is a 74 y.o. male with a history of diverticulitis, hemicolectomy with incisional hernia repair performed on 4/13 with Dr. Reed Forth presents for post op problem. Had a very loose BM prior to leaving hospital yesterday, but since has had no BM and isn't passing gas. This morning he tried to take dulocolax with water and he vomited it up right after taking it, along with his breakfast. Pt not complaining of a significant amount of pain currently, but having swelling of his abdomen, still draining bright red blood in the drain. No fever at this time. PCP: Altagracia Moore MD    Current Outpatient Medications   Medication Sig Dispense Refill    ondansetron hcl (Zofran) 4 mg tablet Take 1 Tablet by mouth every eight (8) hours as needed for Nausea. 12 Tablet 0    oxyCODONE-acetaminophen (PERCOCET) 5-325 mg per tablet Take 1 Tablet by mouth every six (6) hours as needed for Pain for up to 5 days. Max Daily Amount: 4 Tablets. 20 Tablet 0    B.animalis,bifid,infantis,long (Probiotic 4X) 10-15 mg TbEC Take  by mouth daily.  acetaminophen (TYLENOL) 500 mg tablet Take 500 mg by mouth every six (6) hours as needed for Pain.  atorvastatin (LIPITOR) 10 mg tablet Take 10 mg by mouth daily.  Indications: excessive fat in the blood         Past History     Past Medical History:  Past Medical History:   Diagnosis Date    High cholesterol     Hypertension        Past Surgical History:  Past Surgical History:   Procedure Laterality Date    HX APPENDECTOMY 1968    HX HERNIA REPAIR      IR INJ ABS CYST VIA CATHETER / TUBE  7/26/2021    ND ABDOMEN SURGERY PROC UNLISTED      partial colectomy       Family History:  History reviewed. No pertinent family history. Social History:  Social History     Tobacco Use    Smoking status: Never Smoker    Smokeless tobacco: Never Used   Vaping Use    Vaping Use: Never used   Substance Use Topics    Alcohol use: Yes     Comment: weekly    Drug use: Never       Allergies: Allergies   Allergen Reactions    Ciprofloxacin Hives         Review of Systems   Review of Systems   Constitutional: Negative for chills and fever. HENT: Negative for trouble swallowing. Respiratory: Negative for shortness of breath. Cardiovascular: Negative for chest pain. Gastrointestinal: Positive for abdominal pain, constipation, nausea and vomiting. Negative for blood in stool and diarrhea. Genitourinary: Negative for difficulty urinating. Musculoskeletal: Negative for arthralgias and back pain. Skin: Negative for color change and pallor. Neurological: Negative for dizziness and weakness. All Other Systems Negative  Physical Exam     Vitals:    04/15/22 1646 04/15/22 1930 04/15/22 2100 04/15/22 2230   BP:  (!) 194/92 (!) 180/85 (!) 171/83   Pulse:       Resp:  18  16   Temp:       SpO2: 98% 93% 94% 95%   Weight:       Height:         Physical Exam  Constitutional:       General: He is not in acute distress. Appearance: Normal appearance. He is not ill-appearing or toxic-appearing. HENT:      Head: Normocephalic and atraumatic. Mouth/Throat:      Mouth: Mucous membranes are moist.      Pharynx: Oropharynx is clear. Eyes:      Extraocular Movements: Extraocular movements intact. Conjunctiva/sclera: Conjunctivae normal.   Cardiovascular:      Rate and Rhythm: Regular rhythm. Tachycardia present. Pulses: Normal pulses. Heart sounds: Normal heart sounds.    Pulmonary:      Effort: Pulmonary effort is normal.      Breath sounds: Normal breath sounds. Abdominal:      General: There is distension. Tenderness: There is abdominal tenderness. There is no guarding or rebound. Comments: Vertical incision lower abdomen, no purulent material draining or erythema around the incision. Swelling noted of the lower abdomen, slightly firm compared to upper abdomen but still soft. Mild erythema with bruising. Suction drain present, half full bright red blood. Musculoskeletal:         General: Normal range of motion. Skin:     General: Skin is warm and dry. Neurological:      General: No focal deficit present. Mental Status: He is alert. Mental status is at baseline. Psychiatric:         Mood and Affect: Mood normal.         Behavior: Behavior normal.           Diagnostic Study Results     Labs -     Recent Results (from the past 12 hour(s))   CBC WITH AUTOMATED DIFF    Collection Time: 04/15/22  4:45 PM   Result Value Ref Range    WBC 11.7 4.6 - 13.2 K/uL    RBC 5.45 4.35 - 5.65 M/uL    HGB 15.8 13.0 - 16.0 g/dL    HCT 47.9 36.0 - 48.0 %    MCV 87.9 78.0 - 100.0 FL    MCH 29.0 24.0 - 34.0 PG    MCHC 33.0 31.0 - 37.0 g/dL    RDW 15.1 (H) 11.6 - 14.5 %    PLATELET 491 154 - 198 K/uL    MPV 10.8 9.2 - 11.8 FL    NRBC 0.0 0  WBC    ABSOLUTE NRBC 0.00 0.00 - 0.01 K/uL    NEUTROPHILS 86 (H) 40 - 73 %    LYMPHOCYTES 5 (L) 21 - 52 %    MONOCYTES 8 3 - 10 %    EOSINOPHILS 1 0 - 5 %    BASOPHILS 0 0 - 2 %    IMMATURE GRANULOCYTES 0 0.0 - 0.5 %    ABS. NEUTROPHILS 10.0 (H) 1.8 - 8.0 K/UL    ABS. LYMPHOCYTES 0.6 (L) 0.9 - 3.6 K/UL    ABS. MONOCYTES 0.9 0.05 - 1.2 K/UL    ABS. EOSINOPHILS 0.1 0.0 - 0.4 K/UL    ABS. BASOPHILS 0.0 0.0 - 0.1 K/UL    ABS. IMM.  GRANS. 0.1 (H) 0.00 - 0.04 K/UL    DF AUTOMATED     METABOLIC PANEL, COMPREHENSIVE    Collection Time: 04/15/22  4:45 PM   Result Value Ref Range    Sodium 134 (L) 136 - 145 mmol/L    Potassium 4.8 3.5 - 5.5 mmol/L    Chloride 105 100 - 111 mmol/L    CO2 26 21 - 32 mmol/L    Anion gap 3 3.0 - 18 mmol/L    Glucose 121 (H) 74 - 99 mg/dL    BUN 11 7.0 - 18 MG/DL    Creatinine 0.77 0.6 - 1.3 MG/DL    BUN/Creatinine ratio 14 12 - 20      GFR est AA >60 >60 ml/min/1.73m2    GFR est non-AA >60 >60 ml/min/1.73m2    Calcium 9.1 8.5 - 10.1 MG/DL    Bilirubin, total 1.2 (H) 0.2 - 1.0 MG/DL    ALT (SGPT) 41 16 - 61 U/L    AST (SGOT) 39 (H) 10 - 38 U/L    Alk. phosphatase 65 45 - 117 U/L    Protein, total 8.1 6.4 - 8.2 g/dL    Albumin 3.6 3.4 - 5.0 g/dL    Globulin 4.5 (H) 2.0 - 4.0 g/dL    A-G Ratio 0.8 0.8 - 1.7     POC LACTIC ACID    Collection Time: 04/15/22  6:55 PM   Result Value Ref Range    Lactic Acid (POC) 1.03 0.40 - 2.00 mmol/L       Radiologic Studies -   CT ABD PELV W CONT   Final Result      Right lung base atelectasis and/or infiltrate. There are postsurgical changes from ventral hernia repair. Small fluid   collection seen in the subcutaneous tissues anterior abdominal wall suggesting   postoperative hematoma or seroma or abscess. Another small focus of high density   seen in the subcutaneous tissues anterior abdominal wall suggesting small   hematoma. Stranding of the subcutaneous tissues may reflect postsurgical changes   and/or cellulitis. In the anterior abdomen there are 2 areas of fat stranding as described   suggesting possible omental infarctions. There is no bowel obstruction. Small   amount of free intraperitoneal air is seen likely secondary to the surgery. Fluid in the rectosigmoid colon suggesting diarrheal illness. No evidence of   colonic or small bowel obstruction. XR ABD ACUTE W 1 V CHEST   Final Result      Chest: Minimal bibasilar atelectasis otherwise no acute process      ABDOMEN: There is no free air. There is dilated cecum, ascending and transverse   colon with caliber change along the midportion of descending colon. Findings may   represent colonic obstruction or colonic ileus or bowel obstruction.  Also   dilated small bowel loops which may be postoperative ileus or small bowel   obstruction. Advise CT of the abdomen and pelvis for further evaluation to rule   out bowel obstruction. Catheter drain in the mid lower abdomen. CT Results  (Last 48 hours)               04/15/22 2127  CT ABD PELV W CONT Final result    Impression:      Right lung base atelectasis and/or infiltrate. There are postsurgical changes from ventral hernia repair. Small fluid   collection seen in the subcutaneous tissues anterior abdominal wall suggesting   postoperative hematoma or seroma or abscess. Another small focus of high density   seen in the subcutaneous tissues anterior abdominal wall suggesting small   hematoma. Stranding of the subcutaneous tissues may reflect postsurgical changes   and/or cellulitis. In the anterior abdomen there are 2 areas of fat stranding as described   suggesting possible omental infarctions. There is no bowel obstruction. Small   amount of free intraperitoneal air is seen likely secondary to the surgery. Fluid in the rectosigmoid colon suggesting diarrheal illness. No evidence of   colonic or small bowel obstruction. Narrative:  EXAM: CT of the abdomen and pelvis       INDICATION: Status post hernia repair with abdominal pain       COMPARISON: October 5, 2021       TECHNIQUE: Axial CT imaging of the abdomen and pelvis was performed with   intravenous contrast. Multiplanar reformats were generated. One or more dose   reduction techniques were used on this CT: automated exposure control,   adjustment of the mAs and/or kVp according to patient size, and iterative   reconstruction techniques. The specific techniques used on this CT exam have   been documented in the patient's electronic medical record.  Digital Imaging and   Communications in Medicine (DICOM) format image data are available to   nonaffiliated external healthcare facilities or entities on a secure, media   free, reciprocally searchable basis with patient authorization for at least a   12-month period after this study. _______________       FINDINGS:       LOWER CHEST: There is right lower lobe atelectasis and/or infiltrate. LIVER, BILIARY: Small cysts are seen in the liver. No biliary dilation. Gallbladder is unremarkable. PANCREAS: Normal.       SPLEEN: Normal.       ADRENALS: Normal.       KIDNEYS: Normal.       VASCULATURE: Mild calcific atherosclerosis present. LYMPH NODES: No enlarged lymph nodes. GASTROINTESTINAL TRACT: No bowel dilation or wall thickening. There is fluid in   the rectosigmoid colon suggesting diarrheal illness. There is colonic   diverticulosis without diverticulitis. Small amount of free intraperitoneal air   is seen from surgery. Appendix is not visualized. There is no bowel obstruction. PELVIC ORGANS: Unremarkable. BONES: No acute or aggressive osseous abnormalities identified. OTHER: There is stranding in the subcutaneous tissues of the anterior abdominal   wall with a drain in place. There is a midline surgical wound. There is a fluid   collection with an air-fluid level measuring 3.5 x 2.8 cm in the subcutaneous   knees tissues the midline anterior abdominal wall suggesting evolving hematoma. A second focus of subcutaneous hematoma seen more superiorly measuring 2.4 cm. There is stranding of the subcutaneous tissues may reflect postoperative changes   and/or cellulitis. There is a 5.2 x 3.2 cm area of fat stranding of the omentum best seen on axial   images 100 through 93 and a second area of fat stranding in the omentum on axial   images 88 through 77 measuring 4.4 x 3.6 cm. These are suggestive of focal areas   of omental fat infarction.        _______________               CXR Results  (Last 48 hours)               04/15/22 1737  XR ABD ACUTE W 1 V CHEST Final result    Impression:      Chest: Minimal bibasilar atelectasis otherwise no acute process       ABDOMEN: There is no free air. There is dilated cecum, ascending and transverse   colon with caliber change along the midportion of descending colon. Findings may   represent colonic obstruction or colonic ileus or bowel obstruction. Also   dilated small bowel loops which may be postoperative ileus or small bowel   obstruction. Advise CT of the abdomen and pelvis for further evaluation to rule   out bowel obstruction. Catheter drain in the mid lower abdomen. Narrative:  EXAM: PA chest x-ray with flat and upright views of the abdomen       INDICATION: Abdominal pain. Status post hernia surgery on Wednesday. COMPARISON: August 23, 2021       _______________       FINDINGS:       Chest: Heart and mediastinal contours are unremarkable. Lungs are clear. There   is atelectasis at the lung bases. There are no effusions. No acute osseous   findings. ABDOMEN: There is dilated colon with the cecum measuring 11.1 cm in diameter. The colon at the hepatic flexure measures 10 cm. The mid transverse colon   measures approximately 9.8 cm. The descending colon as an abrupt transition in   caliber along the midportion. Findings raise a concern for colonic obstruction   or colonic ileus. There also dilated small bowel loops along the right side the   abdomen measuring 4.5 cm. There may be a component of ileus. No free air. Advise   CT of the abdomen and pelvis further evaluation. No acute osseous findings. There is a catheter drain looped in the lower abdomen with its tip in the left   lower quadrant.       _______________                   Medical Decision Making   I am the first provider for this patient. I reviewed the vital signs, available nursing notes, past medical history, past surgical history, family history and social history. Vital Signs-Reviewed the patient's vital signs.       Records Reviewed: Nursing Notes and Old Medical Records     Procedures: None Procedures    Provider Notes (Medical Decision Making):     4685: Dr. Fredis Cornelius called regarding pt. States that was recommended to ED due to concern by his wife that he was in a lot of pain. Pt informed me he didn't need anything for pain at this time when I left the room. Recommends checking labs, get plain film check for ileus and if needed he can admit him. 1757: Reviewed plain films with Dr. Domonique Pardo prior to radiology review, concern for possible volvulus. Recommends CT with oral contrast, get lactic acid. Wet read x rays: ABDOMEN: There is no free air. There is dilated cecum, ascending and transverse  colon with caliber change along the midportion of descending colon. Findings may  represent colonic obstruction or colonic ileus or bowel obstruction. Also  dilated small bowel loops which may be postoperative ileus or small bowel  obstruction. Advise CT of the abdomen and pelvis for further evaluation to rule  out bowel obstruction. Dr. Fredis Cornelius paged. 1844: spoek with Dr. Fredis Cornelius, let him know what the CT shows when back and will admit if needed. 1940: Rechecked pt, not requesting anything for pain at this time. Has finished hisi oral contrast, radiology notified. 2253: Discussed CT results with Dr. Fredis Cornelius, who had already reviewed pt CT scan. Did not see anything concerning on the scan, and thinks pt is ok to go home and follow up outpatient, but if pt/wife does not feel comfortable he would admit to observation. Discussed with pt, would like to go home. Asked for something to drink and got a coke, pt drank most of it very quickly and threw up the coke. Asking for zofran. Had not had any vomiting with the intake of the oral contrast he had earlier, suspect he drank the carbonated beverage too quickly. Given return precautions and pt and wife verbalized understanding. MED RECONCILIATION:  No current facility-administered medications for this encounter.      Current Outpatient Medications Medication Sig    ondansetron hcl (Zofran) 4 mg tablet Take 1 Tablet by mouth every eight (8) hours as needed for Nausea.  oxyCODONE-acetaminophen (PERCOCET) 5-325 mg per tablet Take 1 Tablet by mouth every six (6) hours as needed for Pain for up to 5 days. Max Daily Amount: 4 Tablets.  B.animalis,bifid,infantis,long (Probiotic 4X) 10-15 mg TbEC Take  by mouth daily.  acetaminophen (TYLENOL) 500 mg tablet Take 500 mg by mouth every six (6) hours as needed for Pain.  atorvastatin (LIPITOR) 10 mg tablet Take 10 mg by mouth daily. Indications: excessive fat in the blood       Disposition:  Home     DISCHARGE NOTE:   Pt has been reexamined. Patient has no new complaints, changes, or physical findings. Care plan outlined and precautions discussed. Results of workup were reviewed with the patient. All medications were reviewed with the patient. All of pt's questions and concerns were addressed. Patient was instructed and agrees to follow up with PCP as well as to return to the ED upon further deterioration. Patient is ready to go home. Follow-up Information     Follow up With Specialties Details Why Contact Info    Merlin Creek, MD General Surgery Schedule an appointment as soon as possible for a visit   [address] [place] [address] EMERGENCY DEPT Emergency Medicine  If symptoms worsen [address] [place] [id number]  [phone]          Discharge Medication List as of 4/15/2022 10:58 PM      CONTINUE these medications which have NOT CHANGED    Details   oxyCODONE-acetaminophen (PERCOCET) 5-325 mg per tablet Take 1 Tablet by mouth every six (6) hours as needed for Pain for up to 5 days. Max Daily Amount: 4 Tablets., Normal, Disp-20 Tablet, R-0      B.animalis,bifid,infantis,long (Probiotic 4X) 10-15 mg TbEC Take  by mouth daily. , Historical Med      acetaminophen (TYLENOL) 500 mg tablet Take 500 mg by mouth every six (6) hours as needed for Pain., Historical Med      atorvastatin (LIPITOR) 10 mg tablet Take 10 mg by mouth daily. Indications: excessive fat in the blood, Historical Med                 Diagnosis     Clinical Impression:   1. Post-op pain    2. Other constipation    3. Nausea and vomiting, unspecified vomiting type          \"Please note that this dictation was completed with "[employer]", the computer voice recognition software. Quite often unanticipated grammatical, syntax, homophones, and other interpretive errors are inadvertently transcribed by the computer software. Please disregard these errors. Please excuse any errors that have escaped final proofreading. \"

## 2022-04-16 NOTE — DISCHARGE INSTRUCTIONS
Continue drinking plenty of fluids and taking your medication for constipation  If you develop a fever, cannot keep fluid down, or the pain worsens, return to the ER

## 2022-04-16 NOTE — ED NOTES
I have reviewed discharge and follow-up instructions with the patient. The patient verbalized understanding. Pt ambulated to ED exit with steady gait, accompanied by spouse. ASHVIN.

## 2022-04-19 NOTE — OP NOTES
[facility]  [facility]    Name:  Krystal Dickerson  MR#:   [id number]  :  1955  ACCOUNT #:  [de-identified]  DATE OF SERVICE:  2022    PREOPERATIVE DIAGNOSIS:  Incisional hernia. POSTOPERATIVE DIAGNOSIS:  Incisional hernia. PROCEDURE PERFORMED:  1. Open repair of complex incisional hernia with mesh. 2.  Bilateral myofascial releases. SURGEON:  Arlene Saravia MD    ASSISTANT:  None. ANESTHESIA:  General endotracheal.    COMPLICATIONS:  None. SPECIMENS REMOVED:  None. IMPLANTS:  None. ESTIMATED BLOOD LOSS:  Minimal.    DRAINS:  Carloz-Robledo x1. INDICATIONS:  This is a gentleman, who presents with a complex incisional hernia. He will undergo repair. I have discussed risks, benefits, and alternatives to him. He understands and wishes to proceed. PROCEDURE:  He was placed in a supine position. His abdomen was prepped and draped in usual fashion. An incision was made in the midline with the knife, carried down through the subcutaneous tissues with electrocautery. The patient was found to have a complex periumbilical hernia after a previous colectomy. There was a lot of scar tissue and I had to dissect through this to the subcutaneous fat and fascial level. Once this was done, I was able to identify the hernia and hernia sac, which contained omentum. The hernia sac was dissected away from the fascial rim and edges and opened and there were some adhesions to small bowel and omentum, which were taken down using blunt dissection, Metzenbaum scissors, and electrocautery. No enterotomies were seen and made. Once this was completed, a retrorectus mesh repair was done. The rectrorectus space bilaterally was dissected off carefully with the electrocautery and blunt dissection. Once enough rectrorectus space was dissected out, the posterior fascia was closed carefully making sure no enterotomies were seen or made with number 1 PDS continuous suture.   After this was complete and the posterior repair was completed, bilateral anterior myofascial releases were done to allow adequate primary closure of the anterior fascia. This was done using electrocautery. This was also done as a vessel type perforating myofascial release. After the myofascial releases were completed, a 15 x 20 ProGrip mesh was placed in an rectorectus space. There was good overlay and overlap of the mesh. Once this was done, bilateral TAP blocks were done for postoperative pain control using Exparel. The anterior fascia was closed with a looped 1 nylon continuous suture. After the repair was completed, a 15-Maltese round Frank drain was left in for postoperative drainage. There was good hemostasis. Subcutaneous tissues were closed using 2-0 and 3-0 Vicryl suture. The skin was closed with 4-0 Monocryl subcuticular closure and Dermabond. The patient tolerated the procedure well.       MD JAM [id number]  D:  04/19/2022 8:30  T:  04/19/2022 13:13  JOB #:  [id number]

## 2024-04-26 NOTE — ANESTHESIA POSTPROCEDURE EVALUATION
Post-Anesthesia Evaluation and Assessment    Cardiovascular Function/Vital Signs  Visit Vitals  /66   Pulse 83   Temp 37.2 °C (98.9 °F)   Resp 22   Ht 5' 11.5\" (1.816 m)   Wt 109.1 kg (240 lb 9.6 oz)   SpO2 94%   BMI 33.09 kg/m²       Patient is status post Procedure(s):  OPEN REPAIR INCISIONAL HERNIA,MYOFASCIAL RELEASES. Nausea/Vomiting: Controlled. Postoperative hydration reviewed and adequate. Pain:  Pain Scale 1: Numeric (0 - 10) (04/13/22 1149)  Pain Intensity 1: 0 (04/13/22 1149)   Managed. Neurological Status:   Neuro (WDL): Exceptions to WDL (04/13/22 1149)   At baseline. Mental Status and Level of Consciousness: Baseline and appropriate for discharge. Pulmonary Status:   O2 Device: Nasal cannula (04/13/22 1149)   Adequate oxygenation and airway patent. Complications related to anesthesia: None    Post-anesthesia assessment completed. No concerns. Patient has met all discharge requirements.     Signed By: Luz Maria Martinez MD    April 13, 2022
Resulted

## (undated) DEVICE — SUT ETHLN 2-0 18IN FS BLK --

## (undated) DEVICE — COVADERM PLUS: Brand: DEROYAL

## (undated) DEVICE — GARMENT,MEDLINE,DVT,INT,CALF,MED, GEN2: Brand: MEDLINE

## (undated) DEVICE — RESERVOIR,SUCTION,100CC,SILICONE: Brand: MEDLINE

## (undated) DEVICE — SUT PROL 2-0 30IN SH BLU --

## (undated) DEVICE — NEEDLE HYPO 21GA L1.5IN INTRAMUSCULAR S STL LATCH BVL UP

## (undated) DEVICE — SUTURE PDS + SZ 1 L96IN ABSRB VLT L65MM TP-1 1/2 CIR PDP880G

## (undated) DEVICE — DERMABOND SKIN ADH 0.7ML --

## (undated) DEVICE — STAPLER 45 RELOAD BLUE: Brand: ENDOWRIST

## (undated) DEVICE — YANKAUER,FLEXIBLE HANDLE,REGLR CAPACITY: Brand: MEDLINE INDUSTRIES, INC.

## (undated) DEVICE — DISPOSABLE SUCTION/IRRIGATOR TUBE SET WITH TIP: Brand: AHTO

## (undated) DEVICE — ELECTRO LUBE IS A SINGLE PATIENT USE DEVICE THAT IS INTENDED TO BE USED ON ELECTROSURGICAL ELECTRODES TO REDUCE STICKING.: Brand: KEY SURGICAL ELECTRO LUBE

## (undated) DEVICE — SOLUTION IRRIG 3000ML 0.9% SOD CHL FLX CONT 0797208] ICU MEDICAL INC]

## (undated) DEVICE — AIRSEAL 5 MM ACCESS PORT AND LOW PROFILE OBTURATOR WITH BLADELESS OPTICAL TIP, 120 MM LENGTH: Brand: AIRSEAL

## (undated) DEVICE — POWDER HEMOSTAT GEL 3.0GR -- SURGICEL

## (undated) DEVICE — SET,IRRIGATION,CYSTO/TUR,90": Brand: MEDLINE

## (undated) DEVICE — SUT VCRL + 0 36IN UR6 VIO --

## (undated) DEVICE — TRAY PREP DRY W/ PREM GLV 2 APPL 6 SPNG 2 UNDPD 1 OVERWRAP

## (undated) DEVICE — NEEDLE COUNTER: Brand: DEROYAL

## (undated) DEVICE — PAD,ABDOMINAL,5"X9",ST,LF,25/BX: Brand: MEDLINE INDUSTRIES, INC.

## (undated) DEVICE — SUT MONOCRYL PLUS UD 4-0 --

## (undated) DEVICE — [URETERAL KIT: 2 - URETERAL CATHETERS, 6 FR OUTER DIAMETER, 70 CM LENGTH,  2 - EMITTING FIBER, 0.75 OUTER DIAMETER, 370 CM LENGTH,  DO NOT USE IF PACKAGE IS DAMAGED]: Brand: IRIS

## (undated) DEVICE — SYRINGE IRRIG 60ML SFT PLIABLE BLB EZ TO GRP 1 HND USE W/

## (undated) DEVICE — COLUMN DRAPE

## (undated) DEVICE — Device

## (undated) DEVICE — SYR LR LCK 1ML GRAD NSAF 30ML --

## (undated) DEVICE — GLOVE SURG SZ 65 THK91MIL LTX FREE SYN POLYISOPRENE

## (undated) DEVICE — KENDALL SCD EXPRESS SLEEVES, KNEE LENGTH, MEDIUM: Brand: KENDALL SCD

## (undated) DEVICE — REDUCER CANN ENDOWRIST 12-8MM -- DA VINCI XI - SNGL USE

## (undated) DEVICE — SUTURE PDS II SZ 1 L36IN ABSRB VLT L36MM CT-1 1/2 CIR Z347H

## (undated) DEVICE — STAPLER INT SZ 31MM H1.5-2.2MM OPN LEG L5.2MM PWR ADJ HT W/

## (undated) DEVICE — GLOVE SURG SZ 8 L12IN FNGR THK79MIL GRN LTX FREE

## (undated) DEVICE — PAD PT POS 36 IN SURGYPAD DISP

## (undated) DEVICE — INTENDED FOR TISSUE SEPARATION, AND OTHER PROCEDURES THAT REQUIRE A SHARP SURGICAL BLADE TO PUNCTURE OR CUT.: Brand: BARD-PARKER ® CARBON RIB-BACK BLADES

## (undated) DEVICE — TOTAL TRAY, DB, 100% SILI FOLEY, 16FR 10: Brand: MEDLINE

## (undated) DEVICE — DRAPE FLD WRM W44XL66IN C6L FOR INTRATEMP SYS THERMABASIN

## (undated) DEVICE — BLADELESS OBTURATOR: Brand: WECK VISTA

## (undated) DEVICE — E-Z CLEAN, PTFE COATED, ELECTROSURGICAL LAPAROSCOPIC ELECTRODE, L-HOOK, 33 CM., SINGLE-USE, FOR USE WITH HAND CONTROL PENCIL: Brand: MEGADYNE

## (undated) DEVICE — VESSEL SEALER XI EXTENDED DISP -- VESSEL

## (undated) DEVICE — SEAL

## (undated) DEVICE — LEGGINGS, PAIR, 31X48, STERILE: Brand: MEDLINE

## (undated) DEVICE — STAPLER SHEATH: Brand: ENDOWRIST

## (undated) DEVICE — SYSTEM SKIN CLSR 22CM DERMBND PRINEO

## (undated) DEVICE — TOWEL,OR,DSP,ST,BLUE,STD,4/PK,20PK/CS: Brand: MEDLINE

## (undated) DEVICE — GLOVE SURG SZ 7 L12IN FNGR THK79MIL GRN LTX FREE

## (undated) DEVICE — 3-0 COATED VICRYL PLUS UNDYED 1X27" SH --

## (undated) DEVICE — MINOR: Brand: MEDLINE INDUSTRIES, INC.

## (undated) DEVICE — STAPLER 45 RELOAD: Brand: ENDOWRIST

## (undated) DEVICE — ARM DRAPE

## (undated) DEVICE — TUBING SUCT L10FT DIA0.25IN UNIV W/ SCALLOPED FEM CONN

## (undated) DEVICE — SUT VCRL + 2-0 27IN SH UD --

## (undated) DEVICE — COVER LT HNDL PLAS RIG 2 PER PK

## (undated) DEVICE — OPTIFOAM GENTLE SA, POSTOP, 4X8: Brand: MEDLINE

## (undated) DEVICE — ROCKER SWITCH PENCIL HOLSTER: Brand: VALLEYLAB

## (undated) DEVICE — TIP COVER ACCESSORY

## (undated) DEVICE — SPONGE LAP 18X18IN STRL -- 5/PK

## (undated) DEVICE — AGENT HEMSTAT W6XL9IN OXIDIZED REGENERATED CELOS ABSRB FOR

## (undated) DEVICE — DRAIN SURG 19FR 0.25IN SIL RND W/ TRCR INDIC DOT RADPQ FULL

## (undated) DEVICE — GLOVE ORANGE PI 7 1/2   MSG9075

## (undated) DEVICE — NEEDLE,HYPODERM,SAFETY,21GX1.5": Brand: MEDLINE

## (undated) DEVICE — SOL IRR NACL 0.9% 500ML POUR --

## (undated) DEVICE — SUTURE ETHLN 0 L96IN NONABSORBABLE BLK CT L40MM 1/2 CIR L886T

## (undated) DEVICE — VISUALIZATION SYSTEM: Brand: CLEARIFY

## (undated) DEVICE — SEAL UNIV 5-8MM DISP BX/10 -- DA VINCI XI - SNGL USE

## (undated) DEVICE — SYR 20ML LL STRL LF --

## (undated) DEVICE — ACCESS PLATFORM FOR MINIMALLY INVASIVE SURGERY.: Brand: GELPORT® LAPAROSCOPIC  SYSTEM

## (undated) DEVICE — SUTURE STRATAFIX SZ 3-0 L30CM NONABSORBABLE UD L19MM FS-2 SXMP2B408

## (undated) DEVICE — BINDER ABD M/L H12IN FOR 46-62IN WHT 4 SLD PNL DSGN HOOP

## (undated) DEVICE — ROBOTIC PACK: Brand: MEDLINE INDUSTRIES, INC.

## (undated) DEVICE — SUTURE ETHLN SZ 2-0 L18IN NONABSORBABLE BLK L26MM FS 3/8 664G

## (undated) DEVICE — KIT,ANTI FOG,W/SPONGE & FLUID,SOFT PACK: Brand: MEDLINE

## (undated) DEVICE — SHEET,DRAPE,40X58,STERILE: Brand: MEDLINE

## (undated) DEVICE — TRI-LUMEN FILTERED TUBE SET WITH ACTIVATED CHARCOAL FILTER: Brand: AIRSEAL

## (undated) DEVICE — SUTURE VCRL + SZ 3-0 L18IN ABSRB UD SH 1/2 CIR TAPERCUT NDL VCP864D

## (undated) DEVICE — SOLUTION LACTATED RINGERS INJECTION USP